# Patient Record
Sex: FEMALE | Race: WHITE | NOT HISPANIC OR LATINO | Employment: FULL TIME | URBAN - METROPOLITAN AREA
[De-identification: names, ages, dates, MRNs, and addresses within clinical notes are randomized per-mention and may not be internally consistent; named-entity substitution may affect disease eponyms.]

---

## 2020-08-11 ENCOUNTER — PRENATAL OFFICE VISIT (OUTPATIENT)
Dept: NURSING | Facility: CLINIC | Age: 30
End: 2020-08-11
Payer: COMMERCIAL

## 2020-08-11 DIAGNOSIS — Z34.81 ENCOUNTER FOR SUPERVISION OF OTHER NORMAL PREGNANCY IN FIRST TRIMESTER: Primary | ICD-10-CM

## 2020-08-11 PROCEDURE — 99207 ZZC NO CHARGE NURSE ONLY: CPT

## 2020-08-11 RX ORDER — PRENATAL VIT/IRON FUM/FOLIC AC 27MG-0.8MG
1 TABLET ORAL DAILY
Qty: 90 TABLET | Refills: 3
Start: 2020-08-11 | End: 2021-06-30

## 2020-08-11 SDOH — ECONOMIC STABILITY: TRANSPORTATION INSECURITY
IN THE PAST 12 MONTHS, HAS LACK OF TRANSPORTATION KEPT YOU FROM MEETINGS, WORK, OR FROM GETTING THINGS NEEDED FOR DAILY LIVING?: NO

## 2020-08-11 SDOH — ECONOMIC STABILITY: FOOD INSECURITY: WITHIN THE PAST 12 MONTHS, THE FOOD YOU BOUGHT JUST DIDN'T LAST AND YOU DIDN'T HAVE MONEY TO GET MORE.: NEVER TRUE

## 2020-08-11 SDOH — ECONOMIC STABILITY: FOOD INSECURITY: WITHIN THE PAST 12 MONTHS, YOU WORRIED THAT YOUR FOOD WOULD RUN OUT BEFORE YOU GOT MONEY TO BUY MORE.: NEVER TRUE

## 2020-08-11 SDOH — ECONOMIC STABILITY: TRANSPORTATION INSECURITY
IN THE PAST 12 MONTHS, HAS THE LACK OF TRANSPORTATION KEPT YOU FROM MEDICAL APPOINTMENTS OR FROM GETTING MEDICATIONS?: NO

## 2020-08-11 SDOH — ECONOMIC STABILITY: INCOME INSECURITY: HOW HARD IS IT FOR YOU TO PAY FOR THE VERY BASICS LIKE FOOD, HOUSING, MEDICAL CARE, AND HEATING?: NOT HARD AT ALL

## 2020-08-11 NOTE — PROGRESS NOTES
Chief Complaint   Patient presents with     Prenatal Care     New Prenatal Nurse Telephone visit   9w0d  Estimated Date of Delivery: Mar 16, 2021      Initial LMP 2020  There is no height or weight on file to calculate BMI.  BP completed using cuff size: NA (Not Taken)    Patient is accompanied by FOB. Prenatal book and folder (containing standard educational hand-outs and brochures) to be given to patient 20. Information in folder reviewed. Questions answered. Brochure given on optional screening available to assess chromosomal anomalies. Pt advised to call the clinic if she has any questions or concerns related to her pregnancy. Prenatal labs obtained 20. New prenatal visit scheduled on 20 with Stephanie Martínez CNM.  Kelly Verdugo RN          No results found for: PAP  18 ASCUS, HPV neg.     (see Care Everywhere)      Patient supplied answers from flow sheet for:  Prenatal OB Questionnaire.  Past Medical History  Diabetes?: No  Hypertension : No  Heart disease, mitral valve prolapse or rheumatic fever?: No  An autoimmune disease such as lupus or rheumatoid arthritis?: No  Kidney disease or urinary tract infection?: (!) Yes(frequent UTI's)  Epilepsy, seizures or spells?: No  Migraine headaches?: (!) Yes  A stroke or loss of function or sensation?: No  Any other neurological problems?: No  Have you ever been treated for depression?: No  Are you having problems with crying spells or loss of self-esteem?: No  Have you ever required psychiatric care?: No  Have you ever had hepatitis, liver disease or jaundice?: No  Have you been treated for blood clots in your veins, deep vein thromosis, inflammation in the veins, thrombosis, phlebitis, pulmonary embolism or varicosities?: No  Have you had excessive bleeding after surgery or dental work?: No  Do you bleed more than other women after a cut or scratch?: No  Do you have a history of anemia?: No  Have you ever had thyroid problems or taken  thyroid medication?: (!) Yes(abn thyroid level 2016)   Do you have any endocrine problems?: No  Have you ever been in a major accident or suffered serious trauma?: No  Within the last year, has anyone hit, slapped, kicked or otherwise hurt you?: No  In the last year, has anyone forced you to have sex when you didn't want to?: No    Past Medical History 2   Have you ever received a blood transfusion?: No  Would you refuse a blood transfusion if a doctor judged it to be medically necessary?: No   If you answered Yes, would you rather die than receive a blood transfusion?: No  If you answered Yes, is this for Protestant reasons?: No  Does anyone in your home smoke?: No  Do you use tobacco products?: No  Do you drink beer, wine or hard liquor?: No  Do you use any of the following: marijuana, speed, cocaine, heroin, hallucinogens or other drugs?: No   Is your blood type Rh negative?: No  Have you ever had abnormal antibodies in your blood?: No  Have you ever had asthma?: No  Have you ever had tuberculosis?: No  Do you have any allergies to drugs or over-the-counter medications?: No  Allergies: Dust Mites, Aspartame, Ethanol, Venlafaxine, Hydrochloride, Sertraline: No  Have you had any breast problems?: No  Have you ever ?: No  Have you had any gynecological surgical procedures such as cervical conization, a LEEP procedure, laser treatment, cryosurgery of the cervix or a dilation and curettage, etc?: (!) Yes  Have you ever had any other surgical procedures?: (!) Yes(see surgical history)  Have you been hospitalized for a nonsurgical reason excluding normal delivery?: No  Have you ever had any anesthetic complications?: No  Have you ever had an abnormal pap smear?: (!) Yes(ASCUS pap HPV neg 5/4/18)    Past Medical History (Continued)  Do you have a history of abnormalities of the uterus?: No  Did your mother take PHUONG or any other hormones when she was pregnant with you?: No  Did it take you more than a year to  become pregnant?: No  Have you ever been evaluated or treated for infertility?: No  Is there a history of medical problems in your family, which you feel may be important to this pregnancy?: No  Do you have any other problems we have not asked about which you feel may be important to this pregnancy?: No    Symptoms since last menstrual period  Do you have any of the following symptoms: abdominal pain, blood in stools or urine, chest pain, shortness of breath, coughing or vomiting up blood, your heart racing or skipping beats, nausea and vomiting, pain on urination or vaginal discharge or bleed: (!) Yes(nausea and vomiting, difficulty sleeping)  Current medications, including over-the-counter medications, you are using? (If not applicable answer none): prenatal vitamin  Will the patient be 35 years old or older at the time of delivery?: No    Has the patient, baby's father or anyone in either family had:  Thalassemia (Italian, Greek, Mediterranean or  background only) and an MCV result less than 80?: No  Neural tube defect such as meningomyelocele, spina bifida or anencephaly?: No  Congenital heart defect?: No  Down's Syndrome?: No  Lester-Sachs disease (Mormon, Cajun, [place])?: No  Sickle cell disease or trait ()?: No  Hemophilia or other inherited problems of blood?: No  Muscular dystrophy?: No  Cystic fibrosis?: No  Shelia's chorea?: No  Mental retardation/autism?: No  If yes, was the person tested for fragile X?: No  Any other inherited genetic or chromosomal disorder?: No  Maternal metabolic disorder (e.g Insulin-dependent diabetes, PKU)?: No  A child with birth defects not listed above?: No  Recurrent pregnancy loss or stillbirth?: No   Has the patient had any medications/street drugs/alcohol since her last menstrual period?: No  Does the patient or baby's father have any other genetic risks?: No    Infection History   Do you object to being tested for Hepatitis B?: No  Do you object to  being tested for HIV?: No   Do you feel that you are at high risk for coming in contact with the AIDS virus?: No  Have you ever been treated for tuberculosis?: No  Have you ever had a positive skin test for tuberculosis?: No  Do you live with someone who has tuberculosis?: No  Have you ever been exposed to tuberculosis?: No  Do you have genital herpes?: No  Does your partner have genital herpes?: No  Have you had a viral illness since your last period?: No  Have you ever had gonorrhea, chlamydia, syphilis, venereal warts, trichomoniasis, pelvic inflammatory disease or any other sexually transmitted disease?: No  Do you know if you are a genital group B streptococcus carrier?: No  Have you had chicken pox/varicella?: (!) Yes   Have you been vaccinated against chicken Pox?: No  Have you had any other infectious diseases?: No    Kelly Verdugo RN

## 2020-08-13 ENCOUNTER — ANCILLARY PROCEDURE (OUTPATIENT)
Dept: ULTRASOUND IMAGING | Facility: CLINIC | Age: 30
End: 2020-08-13
Payer: COMMERCIAL

## 2020-08-13 DIAGNOSIS — Z34.81 ENCOUNTER FOR SUPERVISION OF OTHER NORMAL PREGNANCY IN FIRST TRIMESTER: ICD-10-CM

## 2020-08-13 LAB
ABO + RH BLD: NORMAL
ABO + RH BLD: NORMAL
BLD GP AB SCN SERPL QL: NORMAL
BLOOD BANK CMNT PATIENT-IMP: NORMAL
ERYTHROCYTE [DISTWIDTH] IN BLOOD BY AUTOMATED COUNT: 12.3 % (ref 10–15)
HCT VFR BLD AUTO: 31.2 % (ref 35–47)
HGB BLD-MCNC: 10.6 G/DL (ref 11.7–15.7)
MCH RBC QN AUTO: 30.5 PG (ref 26.5–33)
MCHC RBC AUTO-ENTMCNC: 34 G/DL (ref 31.5–36.5)
MCV RBC AUTO: 90 FL (ref 78–100)
PLATELET # BLD AUTO: 174 10E9/L (ref 150–450)
RBC # BLD AUTO: 3.48 10E12/L (ref 3.8–5.2)
SPECIMEN EXP DATE BLD: NORMAL
WBC # BLD AUTO: 6.2 10E9/L (ref 4–11)

## 2020-08-13 PROCEDURE — 84443 ASSAY THYROID STIM HORMONE: CPT | Performed by: ADVANCED PRACTICE MIDWIFE

## 2020-08-13 PROCEDURE — 85027 COMPLETE CBC AUTOMATED: CPT | Performed by: ADVANCED PRACTICE MIDWIFE

## 2020-08-13 PROCEDURE — 86780 TREPONEMA PALLIDUM: CPT | Performed by: ADVANCED PRACTICE MIDWIFE

## 2020-08-13 PROCEDURE — 86762 RUBELLA ANTIBODY: CPT | Performed by: ADVANCED PRACTICE MIDWIFE

## 2020-08-13 PROCEDURE — 87340 HEPATITIS B SURFACE AG IA: CPT | Performed by: ADVANCED PRACTICE MIDWIFE

## 2020-08-13 PROCEDURE — 87086 URINE CULTURE/COLONY COUNT: CPT | Performed by: ADVANCED PRACTICE MIDWIFE

## 2020-08-13 PROCEDURE — 84439 ASSAY OF FREE THYROXINE: CPT | Performed by: INTERNAL MEDICINE

## 2020-08-13 PROCEDURE — 87389 HIV-1 AG W/HIV-1&-2 AB AG IA: CPT | Performed by: ADVANCED PRACTICE MIDWIFE

## 2020-08-13 PROCEDURE — 86900 BLOOD TYPING SEROLOGIC ABO: CPT | Performed by: ADVANCED PRACTICE MIDWIFE

## 2020-08-13 PROCEDURE — 76801 OB US < 14 WKS SINGLE FETUS: CPT

## 2020-08-13 PROCEDURE — 76801 OB US < 14 WKS SINGLE FETUS: CPT | Performed by: OBSTETRICS & GYNECOLOGY

## 2020-08-13 PROCEDURE — 36415 COLL VENOUS BLD VENIPUNCTURE: CPT | Performed by: ADVANCED PRACTICE MIDWIFE

## 2020-08-13 PROCEDURE — 86850 RBC ANTIBODY SCREEN: CPT | Performed by: ADVANCED PRACTICE MIDWIFE

## 2020-08-13 PROCEDURE — 86901 BLOOD TYPING SEROLOGIC RH(D): CPT | Performed by: ADVANCED PRACTICE MIDWIFE

## 2020-08-14 DIAGNOSIS — O99.011 ANEMIA DURING PREGNANCY IN FIRST TRIMESTER: Primary | ICD-10-CM

## 2020-08-14 LAB
HBV SURFACE AG SERPL QL IA: NONREACTIVE
HIV 1+2 AB+HIV1 P24 AG SERPL QL IA: NONREACTIVE
RUBV IGG SERPL IA-ACNC: 23 IU/ML
T PALLIDUM AB SER QL: NONREACTIVE
T4 FREE SERPL-MCNC: 1.52 NG/DL (ref 0.76–1.46)
TSH SERPL DL<=0.005 MIU/L-ACNC: <0.01 MU/L (ref 0.4–4)

## 2020-08-14 RX ORDER — FERROUS SULFATE 325(65) MG
325 TABLET, DELAYED RELEASE (ENTERIC COATED) ORAL DAILY
Qty: 90 TABLET | Refills: 3 | Status: SHIPPED | OUTPATIENT
Start: 2020-08-14 | End: 2021-06-30

## 2020-08-15 LAB
BACTERIA SPEC CULT: NORMAL
SPECIMEN SOURCE: NORMAL

## 2020-08-21 ENCOUNTER — TELEPHONE (OUTPATIENT)
Dept: OBGYN | Facility: CLINIC | Age: 30
End: 2020-08-21

## 2020-08-21 NOTE — TELEPHONE ENCOUNTER
Called and left message regarding lab results - hemoglobin and thyroid.  She had originally had a new OB appt.  She no longer has one scheduled.

## 2020-08-22 ENCOUNTER — NURSE TRIAGE (OUTPATIENT)
Dept: NURSING | Facility: CLINIC | Age: 30
End: 2020-08-22

## 2020-08-22 ENCOUNTER — HOSPITAL ENCOUNTER (EMERGENCY)
Facility: CLINIC | Age: 30
Discharge: HOME | End: 2020-08-22
Attending: EMERGENCY MEDICINE | Admitting: EMERGENCY MEDICINE
Payer: COMMERCIAL

## 2020-08-22 ENCOUNTER — APPOINTMENT (OUTPATIENT)
Dept: ULTRASOUND IMAGING | Facility: CLINIC | Age: 30
End: 2020-08-22
Attending: EMERGENCY MEDICINE
Payer: COMMERCIAL

## 2020-08-22 VITALS
HEART RATE: 91 BPM | HEIGHT: 66 IN | SYSTOLIC BLOOD PRESSURE: 122 MMHG | WEIGHT: 130 LBS | BODY MASS INDEX: 20.89 KG/M2 | OXYGEN SATURATION: 99 % | DIASTOLIC BLOOD PRESSURE: 85 MMHG | RESPIRATION RATE: 18 BRPM | TEMPERATURE: 98 F

## 2020-08-22 DIAGNOSIS — R82.71 BACTERIURIA DURING PREGNANCY: ICD-10-CM

## 2020-08-22 DIAGNOSIS — R10.2 SUPRAPUBIC PAIN: ICD-10-CM

## 2020-08-22 DIAGNOSIS — O99.891 BACTERIURIA DURING PREGNANCY: ICD-10-CM

## 2020-08-22 LAB
ALBUMIN UR-MCNC: NEGATIVE MG/DL
AMORPH CRY #/AREA URNS HPF: ABNORMAL /HPF
ANION GAP SERPL CALCULATED.3IONS-SCNC: 7 MMOL/L (ref 3–14)
APPEARANCE UR: CLEAR
B-HCG SERPL-ACNC: ABNORMAL IU/L (ref 0–5)
BACTERIA #/AREA URNS HPF: ABNORMAL /HPF
BASOPHILS # BLD AUTO: 0 10E9/L (ref 0–0.2)
BASOPHILS NFR BLD AUTO: 0.2 %
BILIRUB UR QL STRIP: NEGATIVE
BUN SERPL-MCNC: 9 MG/DL (ref 7–30)
CALCIUM SERPL-MCNC: 8.5 MG/DL (ref 8.5–10.1)
CHLORIDE SERPL-SCNC: 108 MMOL/L (ref 94–109)
CO2 SERPL-SCNC: 23 MMOL/L (ref 20–32)
COLOR UR AUTO: ABNORMAL
CREAT SERPL-MCNC: 0.66 MG/DL (ref 0.52–1.04)
DIFFERENTIAL METHOD BLD: ABNORMAL
EOSINOPHIL # BLD AUTO: 0.1 10E9/L (ref 0–0.7)
EOSINOPHIL NFR BLD AUTO: 1.3 %
ERYTHROCYTE [DISTWIDTH] IN BLOOD BY AUTOMATED COUNT: 12.1 % (ref 10–15)
GFR SERPL CREATININE-BSD FRML MDRD: >90 ML/MIN/{1.73_M2}
GLUCOSE SERPL-MCNC: 97 MG/DL (ref 70–99)
GLUCOSE UR STRIP-MCNC: NEGATIVE MG/DL
HCT VFR BLD AUTO: 31.6 % (ref 35–47)
HGB BLD-MCNC: 10.7 G/DL (ref 11.7–15.7)
HGB UR QL STRIP: NEGATIVE
IMM GRANULOCYTES # BLD: 0 10E9/L (ref 0–0.4)
IMM GRANULOCYTES NFR BLD: 0.5 %
KETONES UR STRIP-MCNC: NEGATIVE MG/DL
LEUKOCYTE ESTERASE UR QL STRIP: ABNORMAL
LYMPHOCYTES # BLD AUTO: 1.3 10E9/L (ref 0.8–5.3)
LYMPHOCYTES NFR BLD AUTO: 21.3 %
MCH RBC QN AUTO: 30.6 PG (ref 26.5–33)
MCHC RBC AUTO-ENTMCNC: 33.9 G/DL (ref 31.5–36.5)
MCV RBC AUTO: 90 FL (ref 78–100)
MONOCYTES # BLD AUTO: 0.4 10E9/L (ref 0–1.3)
MONOCYTES NFR BLD AUTO: 6.7 %
MUCOUS THREADS #/AREA URNS LPF: PRESENT /LPF
NEUTROPHILS # BLD AUTO: 4.3 10E9/L (ref 1.6–8.3)
NEUTROPHILS NFR BLD AUTO: 70 %
NITRATE UR QL: NEGATIVE
NRBC # BLD AUTO: 0 10*3/UL
NRBC BLD AUTO-RTO: 0 /100
PH UR STRIP: 5.5 PH (ref 5–7)
PLATELET # BLD AUTO: 170 10E9/L (ref 150–450)
POTASSIUM SERPL-SCNC: 3.3 MMOL/L (ref 3.4–5.3)
RBC # BLD AUTO: 3.5 10E12/L (ref 3.8–5.2)
RBC #/AREA URNS AUTO: 5 /HPF (ref 0–2)
SODIUM SERPL-SCNC: 138 MMOL/L (ref 133–144)
SOURCE: ABNORMAL
SP GR UR STRIP: 1.01 (ref 1–1.03)
SQUAMOUS #/AREA URNS AUTO: 2 /HPF (ref 0–1)
UROBILINOGEN UR STRIP-MCNC: NORMAL MG/DL (ref 0–2)
WBC # BLD AUTO: 6.1 10E9/L (ref 4–11)
WBC #/AREA URNS AUTO: 7 /HPF (ref 0–5)

## 2020-08-22 PROCEDURE — 81001 URINALYSIS AUTO W/SCOPE: CPT | Performed by: EMERGENCY MEDICINE

## 2020-08-22 PROCEDURE — 87086 URINE CULTURE/COLONY COUNT: CPT | Performed by: EMERGENCY MEDICINE

## 2020-08-22 PROCEDURE — 80048 BASIC METABOLIC PNL TOTAL CA: CPT | Performed by: EMERGENCY MEDICINE

## 2020-08-22 PROCEDURE — 76817 TRANSVAGINAL US OBSTETRIC: CPT

## 2020-08-22 PROCEDURE — 84702 CHORIONIC GONADOTROPIN TEST: CPT | Performed by: EMERGENCY MEDICINE

## 2020-08-22 PROCEDURE — 99285 EMERGENCY DEPT VISIT HI MDM: CPT

## 2020-08-22 PROCEDURE — 85025 COMPLETE CBC W/AUTO DIFF WBC: CPT | Performed by: EMERGENCY MEDICINE

## 2020-08-22 RX ORDER — NITROFURANTOIN 25; 75 MG/1; MG/1
100 CAPSULE ORAL 2 TIMES DAILY
Qty: 14 CAPSULE | Refills: 0 | Status: SHIPPED | OUTPATIENT
Start: 2020-08-22 | End: 2020-09-24

## 2020-08-22 ASSESSMENT — ENCOUNTER SYMPTOMS
ABDOMINAL PAIN: 1
DIFFICULTY URINATING: 0

## 2020-08-22 ASSESSMENT — MIFFLIN-ST. JEOR: SCORE: 1331.43

## 2020-08-22 NOTE — TELEPHONE ENCOUNTER
"fv  \"I am 10 weeks pregnant and for the past hour or so I've been having intense lower abdominal pain. At times it's an 8/10 and I have to bend over.\"  Per epic, patient had a normal US on 8/13/20 due to hx of miscarriage  Denies bleeding or other sx.  Triaged and advised ER  Marizol Jacobsen RN [facility]    COVID 19 Nurse Triage Plan/Patient Instructions    Please be aware that novel coronavirus (COVID-19) may be circulating in the community. If you develop symptoms such as fever, cough, or SOB or if you have concerns about the presence of another infection including coronavirus (COVID-19), please contact your health care provider or visit [email or web address].     Disposition/Instructions    ED Visit recommended. Follow protocol based instructions.     Bring Your Own Device:  Please also bring your smart device(s) (smart phones, tablets, laptops) and their charging cables for your personal use and to communicate with your care team during your visit.    Thank you for taking steps to prevent the spread of this virus.  o Limit your contact with others.  o Wear a simple mask to cover your cough.  o Wash your hands well and often.    Resources    [facility]: About COVID-19: [email or web address]    CDC: What to Do If You're Sick: www.cdc.gov/coronavirus/2019-ncov/about/steps-when-sick.html    CDC: Ending Home Isolation: www.cdc.gov/coronavirus/2019-ncov/hcp/disposition-in-home-patients.html     CDC: Caring for Someone: www.cdc.gov/coronavirus/2019-ncov/if-you-are-sick/care-for-someone.html     [facility]: Interim Guidance for Hospital Discharge to Home: www.health.state.[place]/diseases/coronavirus/hcp/hospdischarge.pdf    [facility] clinical trials (COVID-19 research studies): [email or web address]/umn-clinical-trials     Below are the COVID-19 hotlines at the [place] Department of Health ([facility]). Interpreters are available.   o For health questions: Call 702-392-1014 or " "1-100.197.7627 (7 a.m. to 7 p.m.)  o For questions about schools and childcare: Call 697-870-1606 or 1-155.216.6611 (7 a.m. to 7 p.m.)                     Additional Information    Negative: Lightheadedness or dizziness (e.g., feels like passing out)    Negative: Patient sounds very sick or weak to the triager    Negative: MODERATE-SEVERE abdominal pain (e.g., interferes with normal activities, awakens from sleep)    Negative: [1] SEVERE vaginal bleeding (i.e., soaking 2 pads / hour, large blood clots) AND [2] present 2 or more hours    Negative: [1] MODERATE vaginal bleeding (i.e., soaking 1 pad / hour; clots) AND [2] present > 6 hours    Negative: [1] MODERATE vaginal bleeding (i.e., soaking 1 pad / hour; clots) AND [2] pregnant > 12 weeks    Negative: Passed tissue (e.g., gray-white)    Negative: [1] Vomiting AND [2] contains red blood or black (\"coffee ground\") material  (Exception: few red streaks in vomit that only happened once)    Negative: Shoulder pain    [1] Constant abdominal pain AND [2] present > 2 hours    Protocols used: PREGNANCY - ABDOMINAL PAIN LESS THAN 20 WEEKS EGA-A-AH      "

## 2020-08-22 NOTE — ED PROVIDER NOTES
"History     Chief Complaint:  Abdominal Pain       The history is provided by the patient.     Daniel Steiner is a 29 year old female (A3) with a history of miscarriages, kidney stones, and chronic headaches, who presents for evaluation of intermittent abdominal pain that first began approximately two hours ago. She reports that she has been experiencing an intermittent abdominal pain that is \"really intense\" and lasts a few seconds, then subsides. The patient states that this pain comes and goes approximately every minute. Her abdominal pain is across her abdomen and is not one-sided. She denies any vaginal discharge, vaginal bleeding, or difficulty urinating. She called the nurse line for her pain, and then encouraged her to present today to the ED.    Of note, the patient has had three prior miscarriages at 10 weeks, 4 weeks, and 7 weeks. With previous miscarriages she reports a small amount of pain, but mostly just bleeding. She has not experienced pain like this before. The patient shares that has not had a workup done regarding her frequent miscarriages, but her doctors have discussed this if her miscarriages continue. The patient states that she nor her partner have medical problems. Of note, she had a recent ultrasound done as below.     US OB < 14 weeks from 2020:  Early obstetric transabdominal ultrasound. Living intrauterine pregnancy is seen.   Corresponding sonographic and menstrual EGA and EDC. Based on this, best estimated Date of Delivery: Mar 16, 2021  Normal amniotic fluid seen.  Placenta: not seen, appropriate for this gestational age  Gestational sac: seen and within normal limits   Normal early OB ultrasound.  Reading per radiology.    Allergies:  No Known Drug Allergies    Medications:   Ferrous sulfate  Prenatal vitamin     Medical History:   Chronic headaches  Kidney stone  B12 deficiency     Surgical History   D & C   Septoplasty  Tonsillectomy & adenoidectomy    Family History: " "  Mother -  anxiety    Social History:  The patient was unaccompanied to the ED.  Smoking Status: Former - 2010  Smokeless Tobacco: Never  Alcohol Use: No  Drug Use: Never       Review of Systems   Gastrointestinal: Positive for abdominal pain.   Genitourinary: Negative for difficulty urinating, vaginal bleeding and vaginal discharge.   All other systems reviewed and are negative.        Physical Exam     Patient Vitals for the past 24 hrs:   BP Temp Temp src Pulse Resp SpO2 Height Weight   08/22/20 1700 122/85 -- -- 91 -- 99 % -- --   08/22/20 1600 123/82 -- -- 98 -- 100 % -- --   08/22/20 1500 114/77 -- -- 94 -- 100 % -- --   08/22/20 1419 121/87 98  F (36.7  C) Temporal 99 18 97 % 1.676 m (5' 6\") 59 kg (130 lb)          Physical Exam  I have reviewed the triage vital signs    Constitutional: Appears stated age  Eyes: No discharge, symmetrical lids  ENT: Moist mucous membranes, no ear discharge  Neck: Full range of motion  Respiratory: CTAB, no wheezes  Cardiovascular: Regular rate and rhythm, no lower extremity edema  Chest: Equal rise  Gastrointestinal: Soft. Nondistended. Suprapubic TTP. No rebound or guarding  Musculoskeletal: No gross deformities.   Skin: Warm and well perfused. No visible rash.  Neurologic: Moves all extremities, speech fluent without dysarthria  Psychiatric: Appropriate affect, alert and interactive       Emergency Department Course     Imaging:  Radiology results were communicated with the patient who voiced understanding of the findings.    US OB < 14 Weeks W Transvaginal:  IMPRESSION:   1.  Single living intrauterine gestation at 10 weeks 5 days, EDC   3/15/2021.   Reading per radiology.    Laboratory:  Laboratory findings were communicated with the patient who voiced understanding of the findings.    CBC: WBC 6.1, HGB 10.7 (L),   BMP: Potassium 3.3 (L), (Creatinine 0.66) o/w WNL   HCG Quantitative:  71,485 (H)     UA with Microscopic: Leukocyte Esterase Large (A), RBC/HPF 5 (H), " WBC/HPF 7 (H), Bacteria few (A), Squamous Epithelial 2 (H), Mucous present (A), Amorphous crystals few (A), o/w WNL  Urine Culture: pending    Emergency Department Course:    1421 Nursing notes and vitals reviewed.    1429 I performed an exam of the patient as documented above.     1435 IV was inserted and blood was drawn for laboratory testing, results above.    1556 The patient provided a urine sample here in the emergency department. This was sent for laboratory testing, findings above.     1536 The patient was sent for US while in the emergency department, results above.     1640 Patient rechecked and updated.      1648 Findings and plan explained to the Patient. Patient discharged home with instructions regarding supportive care, medications, and reasons to return. The importance of close follow-up was reviewed. The patient was prescribed as below.    Impression & Plan     Medical Decision MakinF  10 weeks GA presenting with lower abdominal pain.  DDx includes but is not limited to threatened , subchorionic hematoma, inevitable , UTI, bacteruria, round ligament pain.  TV US obtained, showing a living IUP.  Labs obtained, as above.  UA shows bacteruria.  Pt does report some mild intermittent dysuria; this could feasibly be the cause of pt's pain; will culture, and treat empirically with macrobid.  Advised close follow-up with her primary OB.  Strict RTED precautions given.        Diagnosis:     ICD-10-CM    1. Bacteriuria during pregnancy  O99.89     R82.71    2. Suprapubic pain  R10.2         Disposition:  Discharged to home.    Discharge Medications:  Discharge Medication List as of 2020  5:03 PM      START taking these medications    Details   nitroFURantoin macrocrystal-monohydrate (MACROBID) 100 MG capsule Take 1 capsule (100 mg) by mouth 2 times daily, Disp-14 capsule,R-0, Local Print             Scribe Disclosure:  Kiana CHAHAL, am serving as a scribe at 2:22 PM on  8/22/2020 to document services personally performed by Abdirahman Wyman MD based on my observations and the provider's statements to me.      Abdirahman Wyman MD  08/22/20 8771

## 2020-08-22 NOTE — ED AVS SNAPSHOT
[facility] Emergency Department  [address]  [facility] [address]  Phone:  [phone]  Fax:  [phone]                                    Daniel Steiner   MRN: [id number]    Department:  [facility] Emergency Department   Date of Visit:  8/22/2020           After Visit Summary Signature Page    I have received my discharge instructions, and my questions have been answered. I have discussed any challenges I see with this plan with the nurse or doctor.    ..........................................................................................................................................  Patient/Patient Representative Signature      ..........................................................................................................................................  Patient Representative Print Name and Relationship to Patient    ..................................................               ................................................  Date                                   Time    ..........................................................................................................................................  Reviewed by Signature/Title    ...................................................              ..............................................  Date                                               Time          22EPIC Rev 08/18

## 2020-08-22 NOTE — ED TRIAGE NOTES
ABCs intact. Pt is about 10 weeks pregnant. Pt has had 3 miscarriages. Pt c/o abdominal cramping x 2 hrs. Denies vaginal bleeding or urinary symptoms. Denies fever, cough, sore throat or headache.

## 2020-08-23 LAB
BACTERIA SPEC CULT: NORMAL
Lab: NORMAL
SPECIMEN SOURCE: NORMAL

## 2020-08-28 ENCOUNTER — PRENATAL OFFICE VISIT (OUTPATIENT)
Dept: OBGYN | Facility: CLINIC | Age: 30
End: 2020-08-28
Payer: COMMERCIAL

## 2020-08-28 VITALS
SYSTOLIC BLOOD PRESSURE: 118 MMHG | BODY MASS INDEX: 21.86 KG/M2 | HEIGHT: 66 IN | WEIGHT: 136 LBS | DIASTOLIC BLOOD PRESSURE: 70 MMHG

## 2020-08-28 DIAGNOSIS — Z12.4 SCREENING FOR CERVICAL CANCER: Primary | ICD-10-CM

## 2020-08-28 DIAGNOSIS — Z34.91 PRENATAL CARE IN FIRST TRIMESTER: ICD-10-CM

## 2020-08-28 DIAGNOSIS — E05.90 HYPERTHYROIDISM: ICD-10-CM

## 2020-08-28 LAB
T4 FREE SERPL-MCNC: 1.16 NG/DL (ref 0.76–1.46)
TSH SERPL DL<=0.005 MIU/L-ACNC: 0.03 MU/L (ref 0.4–4)

## 2020-08-28 PROCEDURE — 99207 ZZC FIRST OB VISIT: CPT | Performed by: ADVANCED PRACTICE MIDWIFE

## 2020-08-28 PROCEDURE — 84439 ASSAY OF FREE THYROXINE: CPT | Performed by: ADVANCED PRACTICE MIDWIFE

## 2020-08-28 PROCEDURE — 36415 COLL VENOUS BLD VENIPUNCTURE: CPT | Performed by: ADVANCED PRACTICE MIDWIFE

## 2020-08-28 PROCEDURE — 40000791 ZZHCL STATISTIC VERIFI PRENATAL TRISOMY 21,18,13: Mod: 90 | Performed by: ADVANCED PRACTICE MIDWIFE

## 2020-08-28 PROCEDURE — 99000 SPECIMEN HANDLING OFFICE-LAB: CPT | Performed by: ADVANCED PRACTICE MIDWIFE

## 2020-08-28 PROCEDURE — 84443 ASSAY THYROID STIM HORMONE: CPT | Performed by: ADVANCED PRACTICE MIDWIFE

## 2020-08-28 ASSESSMENT — MIFFLIN-ST. JEOR: SCORE: 1358.64

## 2020-08-28 NOTE — PROGRESS NOTES
is a  partnered   3 para 0 0 2 0 giving an EDC by ultrasound of 3/16/2021 who presents for a new OB Visit with her partner. She has not had bleeding since her LMP.  She has had mild nausea.. Weigh loss   has not occurred.. She denies fever, chills and viral infections since her last LMP.  There is mildfatigue.  This was a planned pregnancy.  She is not a previous CNM patient.  FOB is present and supportive.  =========================================    INFECTION HISTORY  HIV: no  Hepatitis B: no  Hepatitis C: no  Tuberculosis: no  Last PPD   Herpes self: no  Herpes partner:  no  Chlamydia:  No - declines testing  Gonorrhea:  No - declines testing   HPV: no  BV:  no  Trichomonis:  no  Syphilis:  no  Chicken Pox:  yes  ============================================    PERSONAL/SOCIAL HISTORY  Lives with partner.  Employment: Full time.  Discussed risk for COVID.    =============================================    REVIEW OF SYSTEMS  CONSTITUTIONAL: Denies fever, chills and night sweats  DIET: Appetite is continue.  Eats a regular. .    Plans to gain 25-35 pounds with her pregnancy.  SKIN: Denies changes and suspicious moles or lesions.  ENT: Denies blurred vision, hearing loss, tinnitus, frequent colds, epistaxis, hoarseness and tooth pain.    ENDOCRINE: Denies heat and cold intolerance, and polydypsia.    BREASTS:  Tender since LMP.  Denies discharge and lumps.   HEART/LUNGS: Denies dyspnea, wheezing, coughing and chest pain.  HEMATOLOGIC: Denies tendency to bruise and history of blood clots.  GASTROINTESTINAL: Denies heartburn, indigestion and change of color in stools.    GENITOURINARY:  Denies urgency, dysuria and hematuria.  Has frequency of urination since LMP.   NEUROLOGIC:  Denies seizures, weakness and fainting.  PSYCHIATRIC:  Denies mood changes  ===========================================    PHYSICAL EXAM  GENERAL:   pleasant pregnant female, alert, cooperative, and well groomed.  SKIN:  Warm  and dry, without lesions or tenderness.    HEAD: Symmetrical features.  EYES:  PERRLA, wears no corective lenses.  EARS: Tympanic membranes gray, translucent and intact.  NOSE: Masked   MOUTH:  Masked.    NECK:  Thyroid without enlargement and nodules.  Lymph nodes not palpable.   LUNGS:  Clear to auscultation.  BREAST:  Symmetrical without lesions or nodes.  Nipples everted.  Areolas symmetric.  No palpable axillary nodes.  HEART:  RRR without murmur.  ABDOMEN: Soft without masses or tenderness.  No CVA tenderness.  Uterus palpable at size equal to dates.    MUSCULOSKELETAL:  Full range of motion  GENITALIA: Pelvic exam offered and declined.  Deferred.  ===================================================    PLAN:  Instructed on use of triage nurse line and contacting the on call CNM after hours in an emergency.    Discussed the indications, uses for and false positives for quad screen and fetal survey ultrasounds at 18-20 weeks gestation.  Will inform us at the next visit if she wished to avail herself of these screens.  Innatal ordered  Per her request.  They would like to know the gender.  Will return to the clinic in 4 weeks for her next routine prenatal check.  Will call to be seen sooner if problem arise.  Oriented to CNM Service and added to list.      Stephanie Martínez, SHAINA, APRN, CNM

## 2020-08-28 NOTE — NURSING NOTE
"Chief Complaint   Patient presents with     Prenatal Care       Initial /70 (BP Location: Right arm, Cuff Size: Adult Regular)   Ht 1.676 m (5' 6\")   Wt 61.7 kg (136 lb)   LMP 2020   BMI 21.95 kg/m   Estimated body mass index is 21.95 kg/m  as calculated from the following:    Height as of this encounter: 1.676 m (5' 6\").    Weight as of this encounter: 61.7 kg (136 lb).  BP completed using cuff size: regular    Questioned patient about current smoking habits.  Pt. has never smoked.          The following HM Due: pap smear      Frandy Munoz MA    "

## 2020-09-02 LAB — LAB SCANNED RESULT: NORMAL

## 2020-09-03 ENCOUNTER — TELEPHONE (OUTPATIENT)
Dept: MIDWIFE SERVICES | Facility: CLINIC | Age: 30
End: 2020-09-03

## 2020-09-03 NOTE — TELEPHONE ENCOUNTER
Results received from [facility] testing in [facility]..  Testing done:  Innatal Prenatal Screen    Action:  Spoke to pt and gave NORMAL results.    Gender:   **GIRL**  Gender revealed via letter for pt to .    Routed to provider as YOHANNES Ghosh RN

## 2020-09-10 ENCOUNTER — TELEPHONE (OUTPATIENT)
Dept: MIDWIFE SERVICES | Facility: CLINIC | Age: 30
End: 2020-09-10

## 2020-09-10 NOTE — TELEPHONE ENCOUNTER
Pt calling, stating her dog jumped on her abdomen this AM.  13w2d.  Blood type A+.  Denies cramping/pain or bleeding.    Advised to monitor throughout the day.  Let us know if she has any pain or bleeding.    Jaimee Ghosh RN

## 2020-09-24 ENCOUNTER — PRENATAL OFFICE VISIT (OUTPATIENT)
Dept: OBGYN | Facility: CLINIC | Age: 30
End: 2020-09-24
Payer: COMMERCIAL

## 2020-09-24 VITALS — SYSTOLIC BLOOD PRESSURE: 116 MMHG | DIASTOLIC BLOOD PRESSURE: 74 MMHG | BODY MASS INDEX: 21.95 KG/M2 | WEIGHT: 136 LBS

## 2020-09-24 DIAGNOSIS — Z34.92 NORMAL PREGNANCY, SECOND TRIMESTER: Primary | ICD-10-CM

## 2020-09-24 DIAGNOSIS — R30.0 DYSURIA: ICD-10-CM

## 2020-09-24 LAB
ALBUMIN UR-MCNC: NEGATIVE MG/DL
APPEARANCE UR: CLEAR
BACTERIA #/AREA URNS HPF: ABNORMAL /HPF
BILIRUB UR QL STRIP: NEGATIVE
COLOR UR AUTO: YELLOW
GLUCOSE UR STRIP-MCNC: NEGATIVE MG/DL
HGB UR QL STRIP: NEGATIVE
KETONES UR STRIP-MCNC: NEGATIVE MG/DL
LEUKOCYTE ESTERASE UR QL STRIP: ABNORMAL
NITRATE UR QL: NEGATIVE
NON-SQ EPI CELLS #/AREA URNS LPF: ABNORMAL /LPF
PH UR STRIP: 7.5 PH (ref 5–7)
RBC #/AREA URNS AUTO: ABNORMAL /HPF
SOURCE: ABNORMAL
SP GR UR STRIP: 1.01 (ref 1–1.03)
UROBILINOGEN UR STRIP-ACNC: 0.2 EU/DL (ref 0.2–1)
WBC #/AREA URNS AUTO: ABNORMAL /HPF

## 2020-09-24 PROCEDURE — 87086 URINE CULTURE/COLONY COUNT: CPT | Performed by: ADVANCED PRACTICE MIDWIFE

## 2020-09-24 PROCEDURE — 81001 URINALYSIS AUTO W/SCOPE: CPT | Performed by: ADVANCED PRACTICE MIDWIFE

## 2020-09-24 PROCEDURE — 87088 URINE BACTERIA CULTURE: CPT | Performed by: ADVANCED PRACTICE MIDWIFE

## 2020-09-24 PROCEDURE — 87186 SC STD MICRODIL/AGAR DIL: CPT | Performed by: ADVANCED PRACTICE MIDWIFE

## 2020-09-24 PROCEDURE — 99207 ZZC PRENATAL VISIT: CPT | Performed by: ADVANCED PRACTICE MIDWIFE

## 2020-09-24 NOTE — NURSING NOTE
"Chief Complaint   Patient presents with     Prenatal Care     15w 2d no concerns       Initial /74 (BP Location: Right arm, Cuff Size: Adult Regular)   Wt 61.7 kg (136 lb)   LMP 2020   BMI 21.95 kg/m   Estimated body mass index is 21.95 kg/m  as calculated from the following:    Height as of 20: 1.676 m (5' 6\").    Weight as of this encounter: 61.7 kg (136 lb).  BP completed using cuff size: regular    Questioned patient about current smoking habits.  Pt. has never smoked.          The following HM Due: NONE    Shaneka Luna CMA    "

## 2020-09-24 NOTE — PROGRESS NOTES
S: Feeling well. Has been having dysuria and frequency in the past two days.  Was in the ER last month with a UTI and was treated with Macrobid, completed entire course of antibiotics.  Symptoms were gone until two days ago.   Fetal movement Not yet  Denies loss of fluid/vb/contractions/pelvic pain  Depression screening done  O:  /74 (BP Location: Right arm, Cuff Size: Adult Regular)   Wt 61.7 kg (136 lb)   LMP 06/09/2020   BMI 21.95 kg/m    Exam:  Constitutional: healthy, alert and no distress  Respiratory: Respirations even and unlabored  Gastrointestinal: Abdomen soft, non-tender. Fundus measures appropriately for gestational age. Fetal heart tones heard easily.  Psychiatric: mentation appears normal and affect normal/bright  A:    Diagnosis Comments   1. Normal pregnancy, second trimester  Alpha fetoprotein maternal screen   2. Dysuria  UA with Microscopic     P: Discussed options for AFP screen, pt would like to do so  Anatomy ultrasound next visit between 20-22 weeks  Return to clinic 4 weeks  Sent UA/UC      ROMÁN Quach participated in the care of this patient under direct supervision of Kinjal Unger CNM.     I was present with the student who participated in the service and documentation of the services provided. I have verified the history and personally performed the physical exam and medical decision making as documented by the student and edited by me.  ANDREE Unger CNM 9/24/2020 1:47 PM

## 2020-09-25 DIAGNOSIS — R30.0 DYSURIA: Primary | ICD-10-CM

## 2020-09-25 RX ORDER — NITROFURANTOIN 25; 75 MG/1; MG/1
100 CAPSULE ORAL 2 TIMES DAILY
Qty: 10 CAPSULE | Refills: 0 | Status: SHIPPED | OUTPATIENT
Start: 2020-09-25 | End: 2020-09-30

## 2020-09-27 LAB
BACTERIA SPEC CULT: ABNORMAL
BACTERIA SPEC CULT: ABNORMAL
SPECIMEN SOURCE: ABNORMAL

## 2020-10-28 ENCOUNTER — PRENATAL OFFICE VISIT (OUTPATIENT)
Dept: OBGYN | Facility: CLINIC | Age: 30
End: 2020-10-28
Payer: COMMERCIAL

## 2020-10-28 ENCOUNTER — ANCILLARY PROCEDURE (OUTPATIENT)
Dept: ULTRASOUND IMAGING | Facility: CLINIC | Age: 30
End: 2020-10-28
Attending: ADVANCED PRACTICE MIDWIFE
Payer: COMMERCIAL

## 2020-10-28 VITALS — BODY MASS INDEX: 22.76 KG/M2 | WEIGHT: 141 LBS | DIASTOLIC BLOOD PRESSURE: 80 MMHG | SYSTOLIC BLOOD PRESSURE: 130 MMHG

## 2020-10-28 DIAGNOSIS — Z34.92 NORMAL PREGNANCY, SECOND TRIMESTER: Primary | ICD-10-CM

## 2020-10-28 DIAGNOSIS — Z34.92 NORMAL PREGNANCY, SECOND TRIMESTER: ICD-10-CM

## 2020-10-28 PROCEDURE — 99207 PR PRENATAL VISIT: CPT | Performed by: ADVANCED PRACTICE MIDWIFE

## 2020-10-28 PROCEDURE — 76805 OB US >/= 14 WKS SNGL FETUS: CPT | Performed by: OBSTETRICS & GYNECOLOGY

## 2020-10-28 NOTE — PATIENT INSTRUCTIONS
Weeks 21 thru 23 - Gestational Age (Fetal Age - Weeks 19 thru 21):  You may be feeling fetal movement every day now. Lanugo now covers the fetus s entire body. The fetus is beginning to have the look of a  infant as the skin becomes less see through and  fat begins to develop. All the parts of the eyes are developed. The liver and pancreas are working hard to develop completely. The fetus has reached about 10-11 inches in length and weighs about 1 - 1   pounds      Patient Education     Understanding  Labor  Going into labor before your 37th week of pregnancy is called  labor.  labor can cause your baby to be born too soon. This can lead to a number of health problems that may affect your baby.      Symptoms of  labor  If you think you re having  labor, get medical help right away. Contractions alone don t mean you re in  labor. What matters more are changes in your cervix (the lower end of the uterus). Symptoms of  labor include:    Four or more contractions per hour    Strong contractions    Constant menstrual-like cramping    Low-back pain    Mucous or bloody vaginal discharge    Bleeding or spotting in the second or third trimester  Evaluating  labor  Your healthcare provider will try to find out whether you re in  labor or whether you re just having contractions. He or she may watch you for a few hours. The following tests may be done:    Pelvic exam to see if your cervix has effaced (thinned) and dilated (opened)    Uterine activity monitoring to detect contractions    Fetal monitoring to check the health of your baby    Ultrasound to check your baby s size and position    Amniocentesis to check how mature your baby s lungs are  Caring for yourself at home  If you have  contractions, but your cervix is still thick and closed, your healthcare provider may ask you to do the following at home:    Drink plenty of water.    Do fewer  activities.    Rest in bed on your side.    Don't have intercourse or nipple stimulation.  When to call your healthcare provider  Call your healthcare provider if you notice any of these:    Four or more contractions per hour    Bag of water breaks    Bleeding or spotting  If you need hospital care   labor often requires that you have hospital care and complete bed rest. You may have an IV (intravenous) line to get fluids. You may be given pills or injections to help prevent contractions. Finally, you may get medicine (corticosteroids) that helps your baby s lungs mature more quickly.  Are you at risk?  Any pregnant woman can have  labor. It may start for no reason. But these risk factors can increase your chances:    Past  labor or past early birth    Smoking, drug, or alcohol use during pregnancy    Multiple fetuses (twins or more)    Problems with the shape of the uterus    Bleeding during the pregnancy  The dangers of  birth  A baby born too soon may have health problems. This is because the baby didn t have enough time to mature. Some of the risks for your baby include:    Not breastfeeding or feeding well    Having immature lungs    Bleeding in the brain    Dying  Reaching term  Your goal is to get as close to term as you can before giving birth. The closer you get to term, the higher your chance of having a healthy baby. Work with your healthcare provider. Together, you can take steps that may keep you from giving birth too early.  Date Last Reviewed: 2016-2019 The "[employer]". [address] [place] [address], [place] [address]. All rights reserved. This information is not intended as a substitute for professional medical care. Always follow your healthcare professional's instructions.

## 2020-10-28 NOTE — PROGRESS NOTES
S: Feels well and has no concerns.  Has started feeling fetal movement.  Denies uterine cramping, vaginal bleeding or leaking of fluid  O: Vitals: /80 (BP Location: Left arm, Cuff Size: Adult Regular)   Wt 64 kg (141 lb)   LMP 06/09/2020   BMI 22.76 kg/m    BMI= Body mass index is 22.76 kg/m .  Exam:  Constitutional: healthy, alert and no distress  Respiratory: respirations even and unlabored  Gastrointestinal: Abdomen soft, non-tender. Fundus measures appropriate for gestational age. Fetal heart tones hear without difficulty and within normal limits  : Deferred  Psychiatric: mentation appears normal and affect normal/bright  A:    Diagnosis Comments   1. Normal pregnancy, second trimester         P: Discussed 20 week fetal screen.   Encouraged patient to call with any questions or concerns.      SWAPNIL Bowser, CNM

## 2020-10-28 NOTE — NURSING NOTE
"Chief Complaint   Patient presents with     Prenatal Care       Initial /80 (BP Location: Left arm, Cuff Size: Adult Regular)   Wt 64 kg (141 lb)   LMP 2020   BMI 22.76 kg/m   Estimated body mass index is 22.76 kg/m  as calculated from the following:    Height as of 20: 1.676 m (5' 6\").    Weight as of this encounter: 64 kg (141 lb).  BP completed using cuff size: regular    Questioned patient about current smoking habits.  Pt. has never smoked.          Frandy Munoz MA               "

## 2020-10-29 DIAGNOSIS — Z34.82 ENCOUNTER FOR SUPERVISION OF OTHER NORMAL PREGNANCY, SECOND TRIMESTER: Primary | ICD-10-CM

## 2020-11-09 ENCOUNTER — ANCILLARY PROCEDURE (OUTPATIENT)
Dept: ULTRASOUND IMAGING | Facility: CLINIC | Age: 30
End: 2020-11-09
Attending: ADVANCED PRACTICE MIDWIFE
Payer: COMMERCIAL

## 2020-11-09 DIAGNOSIS — Z34.82 ENCOUNTER FOR SUPERVISION OF OTHER NORMAL PREGNANCY, SECOND TRIMESTER: ICD-10-CM

## 2020-11-09 PROCEDURE — 76816 OB US FOLLOW-UP PER FETUS: CPT | Performed by: OBSTETRICS & GYNECOLOGY

## 2020-11-11 ENCOUNTER — TELEPHONE (OUTPATIENT)
Dept: OBGYN | Facility: CLINIC | Age: 30
End: 2020-11-11

## 2020-11-11 DIAGNOSIS — R39.9 URINARY SYMPTOM OR SIGN: ICD-10-CM

## 2020-11-11 DIAGNOSIS — Z34.92 NORMAL PREGNANCY, SECOND TRIMESTER: Primary | ICD-10-CM

## 2020-11-11 NOTE — TELEPHONE ENCOUNTER
Called and left message for pt to call back for ultrasound results. Not all structures were able to be visualized on her anatomy scan. It is recommended she go to [facility] for a targeted ultrasound to evaluate those structures not seen. I have placed an order for this.     I also see a my-chart encounter from the patient regarding urinary symptoms which it doesn't look like was followed up on. Can we verify that her symptoms have resolved? If not I would like for her to come in for a U/A and culture.    ANDREE Unger CNM 11/11/2020 10:51 AM

## 2020-11-12 ENCOUNTER — TRANSCRIBE ORDERS (OUTPATIENT)
Dept: MATERNAL FETAL MEDICINE | Facility: CLINIC | Age: 30
End: 2020-11-12

## 2020-11-12 ENCOUNTER — PRE VISIT (OUTPATIENT)
Dept: MATERNAL FETAL MEDICINE | Facility: CLINIC | Age: 30
End: 2020-11-12

## 2020-11-12 DIAGNOSIS — Z34.92 NORMAL PREGNANCY, SECOND TRIMESTER: ICD-10-CM

## 2020-11-12 DIAGNOSIS — O26.90 PREGNANCY RELATED CONDITION, ANTEPARTUM: Primary | ICD-10-CM

## 2020-11-12 DIAGNOSIS — R39.9 URINARY SYMPTOM OR SIGN: ICD-10-CM

## 2020-11-12 LAB

## 2020-11-12 PROCEDURE — 81001 URINALYSIS AUTO W/SCOPE: CPT | Performed by: ADVANCED PRACTICE MIDWIFE

## 2020-11-12 NOTE — TELEPHONE ENCOUNTER
Patient informed of results and recommendations.  States she does have slight discomfort with urination.  Lab appt and order placed.  M referral placed.  Kelly Verdugo RN

## 2020-11-13 DIAGNOSIS — R39.9 URINARY SYMPTOM OR SIGN: Primary | ICD-10-CM

## 2020-11-17 ENCOUNTER — OFFICE VISIT (OUTPATIENT)
Dept: MATERNAL FETAL MEDICINE | Facility: CLINIC | Age: 30
End: 2020-11-17
Attending: ADVANCED PRACTICE MIDWIFE
Payer: COMMERCIAL

## 2020-11-17 ENCOUNTER — HOSPITAL ENCOUNTER (OUTPATIENT)
Dept: ULTRASOUND IMAGING | Facility: CLINIC | Age: 30
End: 2020-11-17
Attending: ADVANCED PRACTICE MIDWIFE
Payer: COMMERCIAL

## 2020-11-17 DIAGNOSIS — O43.199 MARGINAL INSERTION OF UMBILICAL CORD AFFECTING MANAGEMENT OF MOTHER: Primary | ICD-10-CM

## 2020-11-17 DIAGNOSIS — O26.90 PREGNANCY RELATED CONDITION, ANTEPARTUM: ICD-10-CM

## 2020-11-17 PROCEDURE — 76811 OB US DETAILED SNGL FETUS: CPT

## 2020-11-17 PROCEDURE — 76811 OB US DETAILED SNGL FETUS: CPT | Mod: 26 | Performed by: OBSTETRICS & GYNECOLOGY

## 2020-11-17 NOTE — PROGRESS NOTES
Please see ultrasound report under imaging tab for details on ultrasound performed today.    Pinky Huerta MD  , OB/GYN  Maternal-Fetal Medicine  [email or web address]  [phone] (Academic office)  [phone] (Pager)

## 2020-11-22 PROBLEM — O43.199 MARGINAL INSERTION OF UMBILICAL CORD AFFECTING MANAGEMENT OF MOTHER: Status: ACTIVE | Noted: 2020-11-22

## 2020-11-22 PROBLEM — O99.012 ANEMIA DURING PREGNANCY IN SECOND TRIMESTER: Status: ACTIVE | Noted: 2020-11-22

## 2020-11-23 ENCOUNTER — PRENATAL OFFICE VISIT (OUTPATIENT)
Dept: OBGYN | Facility: CLINIC | Age: 30
End: 2020-11-23
Payer: COMMERCIAL

## 2020-11-23 VITALS — SYSTOLIC BLOOD PRESSURE: 122 MMHG | DIASTOLIC BLOOD PRESSURE: 70 MMHG | BODY MASS INDEX: 23.73 KG/M2 | WEIGHT: 147 LBS

## 2020-11-23 DIAGNOSIS — O43.199 MARGINAL INSERTION OF UMBILICAL CORD AFFECTING MANAGEMENT OF MOTHER: ICD-10-CM

## 2020-11-23 DIAGNOSIS — Z34.02 SUPERVISION OF NORMAL FIRST PREGNANCY IN SECOND TRIMESTER: Primary | ICD-10-CM

## 2020-11-23 DIAGNOSIS — Z34.02 ENCOUNTER FOR SUPERVISION OF NORMAL FIRST PREGNANCY IN SECOND TRIMESTER: ICD-10-CM

## 2020-11-23 DIAGNOSIS — R94.6 THYROID FUNCTION TEST ABNORMAL: ICD-10-CM

## 2020-11-23 DIAGNOSIS — O99.012 ANEMIA DURING PREGNANCY IN SECOND TRIMESTER: ICD-10-CM

## 2020-11-23 DIAGNOSIS — R39.9 URINARY SYMPTOM OR SIGN: ICD-10-CM

## 2020-11-23 PROCEDURE — 99207 PR PRENATAL VISIT: CPT | Performed by: ADVANCED PRACTICE MIDWIFE

## 2020-11-23 PROCEDURE — 87086 URINE CULTURE/COLONY COUNT: CPT | Performed by: ADVANCED PRACTICE MIDWIFE

## 2020-11-23 PROCEDURE — 36415 COLL VENOUS BLD VENIPUNCTURE: CPT | Performed by: ADVANCED PRACTICE MIDWIFE

## 2020-11-23 PROCEDURE — 84443 ASSAY THYROID STIM HORMONE: CPT | Performed by: ADVANCED PRACTICE MIDWIFE

## 2020-11-23 PROCEDURE — 84439 ASSAY OF FREE THYROXINE: CPT | Performed by: ADVANCED PRACTICE MIDWIFE

## 2020-11-23 NOTE — PROGRESS NOTES
S: Feels well.  Baby active.  Denies uterine cramping, vaginal bleeding or leaking of fluid.  Reviewed signs and symptoms of  labor.  Discussed noticing patterns of movement over the next 6 weeks so that she can use those in the future to assess normal fetal movement.  She is currently taking iron for her anemia and is feeling much better, without any fatigue.  She has not yet seen endocrine due to her low TSH.  We discussed that thyroid disease in pregnancy can be dangerous to babies, so it is a good idea to be seen and treated as well as keeping an eye on her lab results.  She agrees to TSH and T4 today.  She has noted right upper quadrant discomfort with urination, but no lower pelvic discomfort.  She left urine culture today.  Also noticed left sided back pain.  She has not done anything to help relieve this yet.  Discussed daily stretching, massage, and heat to help with this.  May also use chiropractic care.  Videos for back pain in pregnancy were sent to her via [employer].  She was seen at [facility] for marginal cord insertion.  She has questions today about what that is.  Described this for her and discussed ramifications and why they would want to do repeat growth ultrasound at 28 and 34 weeks.  She plans to have that done through [facility].  Appreciates the description.  O: Vitals: LMP 2020   BMI= Body mass index is 23.73 kg/m .  Exam:  Constitutional: healthy, alert and no distress  Respiratory: respirations even and unlabored  Gastrointestinal: Abdomen soft, non-tender. Fundus measures appropriate for gestational age. Fetal heart tones hear without difficulty and within normal limits  : Deferred  Psychiatric: mentation appears normal and affect normal/bright  A:   Patient Active Problem List   Diagnosis     Anemia during pregnancy in second trimester     Marginal insertion of umbilical cord affecting management of mother     Supervision of normal first pregnancy in second trimester       P: Discussed  GCT/repeat RPR for next visit, handout provided, reminded of longer appointment.  Tdap next visit; reviewed CDC recommendations and partner/family vaccination recommended as well.  Need for Rhogam? No, to be done next visit   TSH and free T4 today  Encouraged patient to call with any questions or concerns.  Return to clinic 4 weeks    Pinky Fang CNM

## 2020-11-24 LAB
T4 FREE SERPL-MCNC: 0.99 NG/DL (ref 0.76–1.46)
TSH SERPL DL<=0.005 MIU/L-ACNC: 0.27 MU/L (ref 0.4–4)

## 2020-11-25 LAB
BACTERIA SPEC CULT: NORMAL
Lab: NORMAL
SPECIMEN SOURCE: NORMAL

## 2020-11-28 ENCOUNTER — NURSE TRIAGE (OUTPATIENT)
Dept: NURSING | Facility: CLINIC | Age: 30
End: 2020-11-28

## 2020-11-28 ENCOUNTER — HOSPITAL ENCOUNTER (OUTPATIENT)
Facility: CLINIC | Age: 30
Discharge: HOME | End: 2020-11-28
Attending: ADVANCED PRACTICE MIDWIFE | Admitting: ADVANCED PRACTICE MIDWIFE
Payer: COMMERCIAL

## 2020-11-28 ENCOUNTER — HOSPITAL ENCOUNTER (OUTPATIENT)
Facility: CLINIC | Age: 30
End: 2020-11-28
Admitting: ADVANCED PRACTICE MIDWIFE
Payer: COMMERCIAL

## 2020-11-28 VITALS — RESPIRATION RATE: 16 BRPM | SYSTOLIC BLOOD PRESSURE: 135 MMHG | DIASTOLIC BLOOD PRESSURE: 84 MMHG

## 2020-11-28 LAB
ALBUMIN UR-MCNC: NEGATIVE MG/DL
APPEARANCE UR: ABNORMAL
BACTERIA #/AREA URNS HPF: ABNORMAL /HPF
BILIRUB UR QL STRIP: NEGATIVE
COLOR UR AUTO: ABNORMAL
GLUCOSE UR STRIP-MCNC: NEGATIVE MG/DL
HGB UR QL STRIP: NEGATIVE
KETONES UR STRIP-MCNC: NEGATIVE MG/DL
LEUKOCYTE ESTERASE UR QL STRIP: ABNORMAL
NITRATE UR QL: NEGATIVE
PH UR STRIP: 7 PH (ref 5–7)
RBC #/AREA URNS AUTO: 2 /HPF (ref 0–2)
SOURCE: ABNORMAL
SP GR UR STRIP: 1.01 (ref 1–1.03)
SPECIMEN SOURCE: NORMAL
SQUAMOUS #/AREA URNS AUTO: 5 /HPF (ref 0–1)
UROBILINOGEN UR STRIP-MCNC: NORMAL MG/DL (ref 0–2)
WBC #/AREA URNS AUTO: 11 /HPF (ref 0–5)
WET PREP SPEC: NORMAL

## 2020-11-28 PROCEDURE — 87086 URINE CULTURE/COLONY COUNT: CPT | Performed by: ADVANCED PRACTICE MIDWIFE

## 2020-11-28 PROCEDURE — G0463 HOSPITAL OUTPT CLINIC VISIT: HCPCS

## 2020-11-28 PROCEDURE — 87210 SMEAR WET MOUNT SALINE/INK: CPT | Performed by: ADVANCED PRACTICE MIDWIFE

## 2020-11-28 PROCEDURE — 81001 URINALYSIS AUTO W/SCOPE: CPT | Performed by: ADVANCED PRACTICE MIDWIFE

## 2020-11-28 RX ORDER — NITROFURANTOIN 25; 75 MG/1; MG/1
100 CAPSULE ORAL 2 TIMES DAILY
Qty: 14 CAPSULE | Refills: 0 | Status: ON HOLD | OUTPATIENT
Start: 2020-11-28 | End: 2020-12-26

## 2020-11-28 NOTE — PROGRESS NOTES
[facility] CNM TRIAGE NOTE    Daniel Steiner is a 30 year old  with and IUP at 24w4d who presents with complaint of contractions x 2 hours. Had an hour of cramping yesterday but states today's episode is more uncomfortable and more frequent. Hasn't timed contractions but perceives them to be less than ten minutes apart. Reports decreased pain since arrival once she emptied her bladder for UA/UC.    Patient states baby is active.  Denies ROM   Denies vaginal bleeding  Patient HAS had intercourse within the last 24 hours.    Present OB History at [facility] with the CNMs.    Problems this pregnancy:   1. Marginal cord insertion   - followed by MFM   - serial growth ultrasounds at 28 weeks and 34 weeks  2. Subclinical hyperthyroidism   - referred to endocrinology; has not had initial consult yet   - last TSH 0.27 20   - plan recheck TSH/T4 at 28 weeks  3. Recurrent UTI in pregnancy   - Positive UA 20, treated with Macrobid   - Positive UA, UC positive staphylococcus saprophyticus 20, treated with  Macrobid   - UA suggestive of UTI 20, patient asymptomatic, UC negative 20    ROS:  Patient is alert and oriented  Endorses left flank pain last week. Denies fevers, flank pain, N/V, dysuria, vaginal bleeding, or leaking of fluid today.    PHYSICAL EXAM:  BP (!) 135/90   Resp 16   LMP 2020     FHT's 135 with moderate variability  Accelerations: present   Decelerations:  absent        Contractions:  Little uterine activity detectable by TOCO. Patient marking cramping every 1-15 minutes. Patient states discomfort is more mild now compared to earlier.    Abdomen: gravid, soft  Back: no CVA tenderness  Bloody show: no  Cervix: deferred  Membranes are intact     Results for orders placed or performed during the hospital encounter of 20   UA with Microscopic reflex to Culture     Status: Abnormal    Specimen: Urine clean catch; Midstream Urine   Result Value Ref  Range    Color Urine Light Yellow     Appearance Urine Slightly Cloudy     Glucose Urine Negative NEG^Negative mg/dL    Bilirubin Urine Negative NEG^Negative    Ketones Urine Negative NEG^Negative mg/dL    Specific Gravity Urine 1.007 1.003 - 1.035    Blood Urine Negative NEG^Negative    pH Urine 7.0 5.0 - 7.0 pH    Protein Albumin Urine Negative NEG^Negative mg/dL    Urobilinogen mg/dL Normal 0.0 - 2.0 mg/dL    Nitrite Urine Negative NEG^Negative    Leukocyte Esterase Urine Moderate (A) NEG^Negative    Source Midstream Urine     WBC Urine 11 (H) 0 - 5 /HPF    RBC Urine 2 0 - 2 /HPF    Bacteria Urine Few (A) NEG^Negative /HPF    Squamous Epithelial /HPF Urine 5 (H) 0 - 1 /HPF   Wet prep     Status: None    Specimen: Vagina   Result Value Ref Range    Specimen Description Vagina     Wet Prep No Trichomonas seen     Wet Prep No yeast seen     Wet Prep Moderate  PMNs seen       Wet Prep No clue cells seen        ASSESSMENT :   30 year old  with barroso IUP 24w4d not in labor  NST reactive  GBS unknown and membranes intact  Recurrent UTI  Mildly elevated blood pressure - 135/90, recheck 135/84      PLAN:  Discharge home  UA suspicious for UTI with high WBCs and positive leukocyte esterase. UC pending. Will treat with Macrobid bid x7 days. Rx sent to preferred pharmacy on file. Encouraged probiotic use to prevent secondary yeast infection.  Instructed to make nurse-only visit within the next week for BP check.  Continue routine prenatal care. Reminded patient to call endocrinology to set up consultation appointment. Clinic number given.    Teaching done r/t to s/s of labor, SROM, decreased fetal movement, comfort measures in third trimester.  Instructed to please refer to the discharge handouts, the RN triage line or on-call CNM for any questions or concerns.  Pt verbalizes understanding and agreement with current plan of care.    SWAPNIL Brantley CNM

## 2020-11-29 NOTE — PLAN OF CARE
UA results suspicious for UTI, orders for macrobid RX sent. Patient still reporting intermittent abdominal cramping, palpates as cramps, likely due to urinary symptoms. Per CNM, ok to discharge to home with RX, follow up as scheduled in clinic.

## 2020-11-29 NOTE — PLAN OF CARE
Data: [facility] presented to [facility]: 2020  5:28 PM.  Reason for maternal/fetal assessment is abdominal pain. Patient reports abdominal pain that feels like a severe cramp that she states has become more regular in frequency today.  Patient is a .  Prenatal record reviewed. Pregnancy has been uncomplicated..  Gestational Age 24w4d. VSS. Fetal movement present. [place] within the last 24 hours, and history of recurrent UTI's this pregnancy. Patient denies leaking of vaginal fluid/rupture of membranes, vaginal bleeding, pelvic pressure, nausea, vomiting, headache, visual disturbances, epigastric or URQ pain, significant edema. Support person is present.   Action: Verbal consent for EFM. Triage assessment completed. Bill of rights reviewed.  Response: Patient verbalized agreement with plan. AMELIE Sanon, aware of patient arrival and admission data. Orders for UA and wet prep and continue to monitor.

## 2020-11-29 NOTE — DISCHARGE INSTRUCTIONS
Discharge Instruction for Undelivered Patients      You were seen for: Abdominal Pain  We Consulted: AMELIE Sanon  You had (Test or Medicine):Fetal and uterine monitoring, wet prep, and urine assessment     Diet:   Drink 8 to 12 glasses of liquids (milk, juice, water) every day.  You may eat meals and snacks.     Activity:  Count fetal kicks everyday (see handout)  Call your doctor or nurse midwife if your baby is moving less than usual.     Call your provider if you notice:  Swelling in your face or increased swelling in your hands or legs.  Headaches that are not relieved by Tylenol (acetaminophen).  Changes in your vision (blurring: seeing spots or stars.)  Nausea (sick to your stomach) and vomiting (throwing up).   Weight gain of 5 pounds or more per week.  Heartburn that doesn't go away.  Signs of bladder infection: pain when you urinate (use the toilet), need to go more often and more urgently.  The bag of ventura (rupture of membranes) breaks, or you notice leaking in your underwear.  Bright red blood in your underwear.  Abdominal (lower belly) or stomach pain.  For first baby: Contractions (tightening) less than 5 minutes apart for one hour or more.  *If less than 34 weeks: Contractions (tightenings) more than 6 times in one hour.  Increase or change in vaginal discharge (note the color and amount)      Follow-up:  As scheduled in the clinic

## 2020-11-30 LAB
BACTERIA SPEC CULT: NORMAL
BACTERIA SPEC CULT: NORMAL
Lab: NORMAL
SPECIMEN SOURCE: NORMAL

## 2020-12-21 ENCOUNTER — PRENATAL OFFICE VISIT (OUTPATIENT)
Dept: OBGYN | Facility: CLINIC | Age: 30
End: 2020-12-21
Payer: COMMERCIAL

## 2020-12-21 VITALS — WEIGHT: 155 LBS | BODY MASS INDEX: 25.02 KG/M2 | DIASTOLIC BLOOD PRESSURE: 80 MMHG | SYSTOLIC BLOOD PRESSURE: 122 MMHG

## 2020-12-21 DIAGNOSIS — O99.012 ANEMIA DURING PREGNANCY IN SECOND TRIMESTER: ICD-10-CM

## 2020-12-21 DIAGNOSIS — O23.42 RECURRENT URINARY TRACT INFECTION AFFECTING PREGNANCY IN SECOND TRIMESTER: ICD-10-CM

## 2020-12-21 DIAGNOSIS — O99.282 HYPERTHYROIDISM AFFECTING PREGNANCY IN SECOND TRIMESTER: ICD-10-CM

## 2020-12-21 DIAGNOSIS — R94.6 THYROID FUNCTION TEST ABNORMAL: ICD-10-CM

## 2020-12-21 DIAGNOSIS — R03.0 ELEVATED BP WITHOUT DIAGNOSIS OF HYPERTENSION: ICD-10-CM

## 2020-12-21 DIAGNOSIS — Z34.02 SUPERVISION OF NORMAL FIRST PREGNANCY IN SECOND TRIMESTER: Primary | ICD-10-CM

## 2020-12-21 DIAGNOSIS — O43.199 MARGINAL INSERTION OF UMBILICAL CORD AFFECTING MANAGEMENT OF MOTHER: ICD-10-CM

## 2020-12-21 DIAGNOSIS — E05.90 HYPERTHYROIDISM AFFECTING PREGNANCY IN SECOND TRIMESTER: ICD-10-CM

## 2020-12-21 PROCEDURE — 99207 PR PRENATAL VISIT: CPT | Performed by: ADVANCED PRACTICE MIDWIFE

## 2020-12-21 PROCEDURE — 90471 IMMUNIZATION ADMIN: CPT | Performed by: ADVANCED PRACTICE MIDWIFE

## 2020-12-21 PROCEDURE — 90715 TDAP VACCINE 7 YRS/> IM: CPT | Performed by: ADVANCED PRACTICE MIDWIFE

## 2020-12-21 NOTE — PATIENT INSTRUCTIONS
"PREECLAMPSIA SIGNS AND SYMPTOMS    Preeclampsia is a dangerous condition that some women develop in the second half of pregnancy. It can also begin after the baby is born.  Preeclampsia causes high blood pressure and can cause problems with many organ systems in your body.  It can also affect the growth of your baby. The exact cause of preeclampsia is unknown, however, there are signs and symptoms to watch for:    -A bad headache that doesn't improve with Tylenol  -Visual changes such as spots, flashes of light, blurry vision  -Pain in the upper right part of your abdomen, especially under the ribs that doesn't go away  -Nausea and/or vomiting  -Feeling extremely tired  -Yellowing of the skin and/or eyes  -Feeling \"not quite right\" or that something is wrong  -An extreme amount of swelling (some swelling in pregnancy is very normal)    If your midwife feels that you are developing preeclampsia, you will have lab tests drawn and will be monitored very closely.     If you are experiencing anyof these symptoms,   Call [facility]   [phone]    Prenatal classes:  Https://[employer]/    "

## 2020-12-21 NOTE — PROGRESS NOTES
S: Feels well,  Baby active.  Denies uterine cramping, vaginal bleeding or leaking of fluid    She was seen in LD on 11/28 d/t contractions. She was treated for presumed UTI with Macrobid, however the urine culture showed >100,000 mixed urogenital brianne. She states she was not having any symptoms of UTI. She denies any further contractions. When being assessed in triage, she was noted to have BP of 135/90, then 135/84 on recheck.     She has questions about birthing classes and was directed to [employer] website, where there are virtual classes being offered.     She states she still has not followed up with endocrinology.     O: Vitals: /80 (BP Location: Right arm, Cuff Size: Adult Regular)   Wt 70.3 kg (155 lb)   LMP 06/09/2020   BMI 25.02 kg/m    BMI= Body mass index is 25.02 kg/m .  Exam:  Constitutional: healthy, alert and no distress  Respiratory: respirations even and unlabored  Gastrointestinal: Abdomen soft, non-tender. Fundus measures appropriate for gestational age. Fetal heart tones hear without difficulty and within normal limits  : Deferred  Psychiatric: mentation appears normal and affect normal/bright      A/P:   1. (Z34.02) Supervision of normal first pregnancy in second trimester  (primary encounter diagnosis)  Plan: TDAP VACCINE (Adacel, Boostrix)  [[id number]],         CANCELED: Glucose tolerance, gest screen, 1         hour, CANCELED: CBC with platelets, CANCELED:         Treponema Abs w Reflex to RPR and Titer,         CANCELED: TSH, CANCELED: T4, free    2. (R94.6) Thyroid function test abnormal  Plan: CANCELED: TSH, CANCELED: T4, free    3. (O99.282,  E05.90) Hyperthyroidism affecting pregnancy in second trimester  Plan: ENDOCRINOLOGY ADULT REFERRAL  - referred to endocrinology; has not had initial consult yet  - last TSH 0.27 11/23/20  - plan recheck TSH/T4 at 28 weeks [ ]  - referral re-sent to endocrinology     4. (O99.012) Anemia during pregnancy in second trimester  - Hgb 10.6  - On  iron  - Recheck at 28wk [ ] and 36wk [ ]    5. (O43.199) Marginal insertion of umbilical cord affecting management of mother    - followed by MFM    - serial growth ultrasounds at 28 weeks and 34 weeks    6. (O23.42) Recurrent urinary tract infection affecting pregnancy in second trimester   - Positive UA 20, treated with Macrobid, UC showed urogenital brianne    - Positive UA, UC positive staphylococcus saprophyticus 20, treated with  Macrobid  -  UA suggestive of UTI 20, patient asymptomatic, UC negative 20  - Treated for presumed UTI , UC >100,000 urogenital brianne    - Only one UC-confirmed UTI this pregnancy. Consider abx prophylaxis for remainer of pregnancy with another positive UC    7. (R03.0) Elevated BP without diagnosis of hypertension  -  triage visit with BP of 135/90, then 135/84 on recheck.   - Continue to monitor BP closely. Discussed s/s preelcampsia  - Briefly discussed gestational htn and preeclampsia       28wk folder reviewed:  Discussed fetal movement kick counts and s/s labor and  labor. Reviewed how to contact on call CNM by texting pager.   EPDS: 4  Breastfeeding: yes  Pain control: epidural     GCT/repeat RPR today. Tdap given; reviewed CDC recommendations and partner/family vaccination recommended as well.  Need for Rhogam? No.     Pt vomited after 1hr glucose, scheduled to come back later this week for repeat  GTT and collection of remainder of labs    Encouraged patient to call with any questions or concerns.  Return to clinic 2 weeks    SWAPNIL Pires, AMELIE

## 2020-12-26 ENCOUNTER — NURSE TRIAGE (OUTPATIENT)
Dept: NURSING | Facility: CLINIC | Age: 30
End: 2020-12-26

## 2020-12-26 ENCOUNTER — HOSPITAL ENCOUNTER (OUTPATIENT)
Facility: CLINIC | Age: 30
End: 2020-12-26
Admitting: ADVANCED PRACTICE MIDWIFE
Payer: COMMERCIAL

## 2020-12-26 ENCOUNTER — TELEPHONE (OUTPATIENT)
Dept: OBGYN | Facility: CLINIC | Age: 30
End: 2020-12-26

## 2020-12-26 ENCOUNTER — HOSPITAL ENCOUNTER (OUTPATIENT)
Facility: CLINIC | Age: 30
Discharge: HOME | End: 2020-12-26
Attending: ADVANCED PRACTICE MIDWIFE | Admitting: ADVANCED PRACTICE MIDWIFE
Payer: COMMERCIAL

## 2020-12-26 VITALS
HEART RATE: 92 BPM | SYSTOLIC BLOOD PRESSURE: 128 MMHG | HEIGHT: 66 IN | DIASTOLIC BLOOD PRESSURE: 79 MMHG | TEMPERATURE: 97.9 F | WEIGHT: 150 LBS | RESPIRATION RATE: 18 BRPM | BODY MASS INDEX: 24.11 KG/M2

## 2020-12-26 DIAGNOSIS — O47.9 UTERINE CONTRACTIONS DURING PREGNANCY: Primary | ICD-10-CM

## 2020-12-26 PROBLEM — O36.8190 DECREASED FETAL MOVEMENT: Status: ACTIVE | Noted: 2020-12-26

## 2020-12-26 LAB
ALBUMIN UR-MCNC: NEGATIVE MG/DL
APPEARANCE UR: CLEAR
BACTERIA #/AREA URNS HPF: ABNORMAL /HPF
BILIRUB UR QL STRIP: NEGATIVE
COLOR UR AUTO: ABNORMAL
FIBRONECTIN FETAL VAG QL: NEGATIVE
GLUCOSE UR STRIP-MCNC: NEGATIVE MG/DL
HGB UR QL STRIP: NEGATIVE
KETONES UR STRIP-MCNC: NEGATIVE MG/DL
LEUKOCYTE ESTERASE UR QL STRIP: ABNORMAL
NITRATE UR QL: NEGATIVE
PH UR STRIP: 7 PH (ref 5–7)
RBC #/AREA URNS AUTO: 1 /HPF (ref 0–2)
SOURCE: ABNORMAL
SP GR UR STRIP: 1.01 (ref 1–1.03)
SPECIMEN SOURCE: ABNORMAL
SQUAMOUS #/AREA URNS AUTO: 7 /HPF (ref 0–1)
TRANS CELLS #/AREA URNS HPF: <1 /HPF (ref 0–1)
UROBILINOGEN UR STRIP-MCNC: NORMAL MG/DL (ref 0–2)
WBC #/AREA URNS AUTO: 2 /HPF (ref 0–5)
WET PREP SPEC: ABNORMAL

## 2020-12-26 PROCEDURE — 82731 ASSAY OF FETAL FIBRONECTIN: CPT | Performed by: ADVANCED PRACTICE MIDWIFE

## 2020-12-26 PROCEDURE — 87210 SMEAR WET MOUNT SALINE/INK: CPT | Performed by: ADVANCED PRACTICE MIDWIFE

## 2020-12-26 PROCEDURE — 81001 URINALYSIS AUTO W/SCOPE: CPT | Performed by: ADVANCED PRACTICE MIDWIFE

## 2020-12-26 PROCEDURE — 87086 URINE CULTURE/COLONY COUNT: CPT | Performed by: ADVANCED PRACTICE MIDWIFE

## 2020-12-26 PROCEDURE — G0463 HOSPITAL OUTPT CLINIC VISIT: HCPCS

## 2020-12-26 PROCEDURE — 59025 FETAL NON-STRESS TEST: CPT

## 2020-12-26 PROCEDURE — 59025 FETAL NON-STRESS TEST: CPT | Performed by: OBSTETRICS & GYNECOLOGY

## 2020-12-26 RX ORDER — ONDANSETRON 2 MG/ML
4 INJECTION INTRAMUSCULAR; INTRAVENOUS EVERY 6 HOURS PRN
Status: DISCONTINUED | OUTPATIENT
Start: 2020-12-26 | End: 2020-12-26 | Stop reason: HOSPADM

## 2020-12-26 RX ORDER — CEPHALEXIN 500 MG/1
500 CAPSULE ORAL 2 TIMES DAILY
Qty: 10 CAPSULE | Refills: 0 | Status: SHIPPED | OUTPATIENT
Start: 2020-12-26 | End: 2020-12-31

## 2020-12-26 RX ORDER — METRONIDAZOLE 500 MG/1
500 TABLET ORAL 2 TIMES DAILY
Qty: 14 TABLET | Refills: 0 | Status: SHIPPED | OUTPATIENT
Start: 2020-12-26 | End: 2021-01-02

## 2020-12-26 ASSESSMENT — MIFFLIN-ST. JEOR: SCORE: 1417.15

## 2020-12-26 NOTE — TELEPHONE ENCOUNTER
Pt calls in     Is 28 weeks pregnant   Delivering at [facility]  Via mid-wife    2nd call today -regarding decreased fetal movement     Earlier was 6 hours -no mvt  Now - 8 hours with no mvt     Pt with NO other symptoms > No vag bleed -abd pain - fever - etc     Paging  for [facility] Mid-wife's called   Will have mid-wife Kinjal Tolbertutcatalina call pt back directly at [phone] for 2nd level triage   (5:08 pm)    Protocol and care advice reviewed  Caller states understanding of the recommended disposition  Advised to call back if further questions or concerns    Nixon Oquendo , RN / [facility]        Reason for Disposition    [1] Pregnant 23 or more weeks AND [2] baby moving less today by kick count  (e.g., kick count < 5 in 1 hour or < 10 in 2 hours)    Additional Information    Negative: Sounds like a life-threatening emergency to the triager    Negative: New blurred vision or vision changes    Negative: [1] SEVERE headache AND [2] not relieved with acetaminophen (e.g., Tylenol)    Negative: Leakage of fluid from vagina    Protocols used: PREGNANCY - DECREASED FETAL MOVEMENT-A-AH

## 2020-12-26 NOTE — TELEPHONE ENCOUNTER
"  \"I am 28 weeks pregnant and I haven't felt the baby move in about 6 hours. \"  Denies other sx at this time   Triaged , patient will try eating and drinking something sweet/will lay on left side to monitor and will call back or go in if needed.  Prefers to try this first  Marizol Jacobsen RN [facility]    COVID 19 Nurse Triage Plan/Patient Instructions    Please be aware that novel coronavirus (COVID-19) may be circulating in the community. If you develop symptoms such as fever, cough, or SOB or if you have concerns about the presence of another infection including coronavirus (COVID-19), please contact your health care provider or visit [email or web address].     Disposition/Instructions    Additional COVID19 information to add for patients.   How can I protect others?  If you have symptoms (fever, cough, body aches or trouble breathing): Stay home and away from others (self-isolate) until:    At least 10 days have passed since your symptoms started, And     You ve had no fever--and no medicine that reduces fever--for 1 full day (24 hours), And      Your other symptoms have resolved (gotten better).     If you don t have symptoms, but a test showed that you have COVID-19 (you tested positive):    Stay home and away from others (self-isolate). Follow the tips under \"How do I self-isolate?\" below for 10 days (20 days if you have a weak immune system).    You don't need to be retested for COVID-19 before going back to school or work. As long as you're fever-free and feeling better, you can go back to school, work and other activities after waiting the 10 or 20 days.     How do I self-isolate?    Stay in your own room, even for meals. Use your own bathroom if you can.     Stay away from others in your home. No hugging, kissing or shaking hands. No visitors.    Don t go to work, school or anywhere else.     Clean  high touch  surfaces often (doorknobs, counters, handles, etc.). Use a household cleaning spray or " wipes. You ll find a full list on the EPA website:  www.epa.gov/pesticide-registration/list-n-disinfectants-use-against-sars-cov-2.    Cover your mouth and nose with a mask, tissue or washcloth to avoid spreading germs.    Wash your hands and face often. Use soap and water.    Caregivers in these groups are at risk for severe illness due to COVID-19:  o People 65 years and older  o People who live in a nursing home or long-term care facility  o People with chronic disease (lung, heart, cancer, diabetes, kidney, liver, immunologic)  o People who have a weakened immune system, including those who:  - Are in cancer treatment  - Take medicine that weakens the immune system, such as corticosteroids  - Had a bone marrow or organ transplant  - Have an immune deficiency  - Have poorly controlled HIV or AIDS  - Are obese (body mass index of 40 or higher)  - Smoke regularly    Caregivers should wear gloves while washing dishes, handling laundry and cleaning bedrooms and bathrooms.    Use caution when washing and drying laundry: Don t shake dirty laundry, and use the warmest water setting that you can.    For more tips, go to www.cdc.gov/coronavirus/2019-ncov/downloads/10Things.pdf.    How can I take care of myself?  1. Get lots of rest. Drink extra fluids (unless a doctor has told you not to).     2. Take Tylenol (acetaminophen) for fever or pain. If you have liver or kidney problems, ask your family doctor if it s okay to take Tylenol.     Adults can take either:     650 mg (two 325 mg pills) every 4 to 6 hours, or     1,000 mg (two 500 mg pills) every 8 hours as needed.     Note: Don t take more than 3,000 mg in one day.   Acetaminophen is found in many medicines (both prescribed and over-the-counter medicines). Read all labels to be sure you don t take too much.     For children, check the Tylenol bottle for the right dose. The dose is based on the child s age or weight.    3. If you have other health problems (like  cancer, heart failure, an organ transplant or severe kidney disease): Call your specialty clinic if you don t feel better in the next 2 days.    4. Know when to call 911: Emergency warning signs include:    Trouble breathing or shortness of breath    Pain or pressure in the chest that doesn t go away    Feeling confused like you haven t felt before, or not being able to wake up    Bluish-colored lips or face    What are the symptoms of COVID-19?     The most common symptoms are cough, fever and trouble breathing.     Less common symptoms include body aches, chills, diarrhea (loose, watery poops), fatigue (feeling very tired), headache, runny nose, sore throat and loss of smell.    COVID-19 can cause severe coughing (bronchitis) and lung infection (pneumonia).    How does it spread?     The virus may spread when a person coughs or sneezes into the air. The virus can travel about 6 feet this way, and it can live on surfaces.      Common  (household disinfectants) will kill the virus.    Who is at risk?  Anyone can catch COVID-19 if they re around someone who has the virus.    How can others protect themselves?     Stay away from people who have COVID-19 (or symptoms of COVID-19).    Wash hands often with soap and water. Or, use hand  with at least 60% alcohol.    Avoid touching the eyes, nose or mouth.     Wear a face mask when you go out in public, when sick or when caring for a sick person.    Where can I get more information?    [facility]: About COVID-19: [email or web address]    CDC: What to Do If You re Sick: www.cdc.gov/coronavirus/2019-ncov/about/steps-when-sick.html    CDC: Ending Home Isolation: www.cdc.gov/coronavirus/2019-ncov/hcp/disposition-in-home-patients.html     CDC: Caring for Someone: www.cdc.gov/coronavirus/2019-ncov/if-you-are-sick/care-for-someone.html     MD: Interim Guidance for Hospital Discharge to Home:  www.[facility]./diseases/coronavirus/hcp/hospdischarge.pdf    [facility] clinical trials (COVID-19 research studies): clinicalaffairs.[facility]-clinical-trials     Below are the COVID-19 hotlines at the [place] [facility] ([facility]). Interpreters are available.   o For health questions: Call 621-618-9073 or 1-665.889.7862 (7 a.m. to 7 p.m.)  o For questions about schools and childcare: Call 703-522-2053 or 1-895.514.9970 (7 a.m. to 7 p.m.)          Thank you for taking steps to prevent the spread of this virus.  o Limit your contact with others.  o Wear a simple mask to cover your cough.  o Wash your hands well and often.    [facility]: About COVID-19: [email or web address]    CDC: What to Do If You're Sick: www.cdc.gov/coronavirus/2019-ncov/about/steps-when-sick.html    CDC: Ending Home Isolation: www.cdc.gov/coronavirus/2019-ncov/hcp/disposition-in-home-patients.html     CDC: Caring for Someone: www.cdc.gov/coronavirus/2019-ncov/if-you-are-sick/care-for-someone.html     [facility]: Interim Guidance for Hospital Discharge to Home: www.[facility]./diseases/coronavirus/hcp/hospdischarge.pdf    [facility] clinical trials (COVID-19 research studies): clinicalaffairs.[facility]-clinical-trials     Below are the COVID-19 hotlines at the [place] [facility] ([facility]). Interpreters are available.   o For health questions: Call 372-209-8629 or 1-357.425.5237 (7 a.m. to 7 p.m.)  o For questions about schools and childcare: Call 729-680-6452 or 1-179.406.3853 (7 a.m. to 7 p.m.)                   Reason for Disposition    [1] Pregnant 23 or more weeks AND [2] baby moving less today by kick count  (e.g., kick count < 5 in 1 hour or < 10 in 2 hours)    Additional Information    Negative: New blurred vision or vision changes    Negative: [1] SEVERE headache AND [2] not relieved with acetaminophen (e.g., Tylenol)    Negative: Leakage of fluid from  vagina    Protocols used: PREGNANCY - DECREASED FETAL MOVEMENT-A-AH

## 2020-12-26 NOTE — TELEPHONE ENCOUNTER
Pt paged on-call provider with reports of decreased fetal movement. States she has only felt one kick in the last 9 hours. Was advised by triage nurse to have some sugar/caffeine and focus on fetal movement which she has done and didn't notice any change. Reports fetus is normally very active during the day, particularly after meals. She denies any other symptoms including LOF, VB or contractions. Pt instructed to present to LDR unit for monitoring, she voiced understanding.  ANDREE Unger CNM 12/26/2020 5:33 PM

## 2020-12-27 NOTE — DISCHARGE INSTRUCTIONS
Discharge Instruction for Undelivered Patients      You were seen for: decreased fetal movement  We Consulted: Kinjal Unger CNM  You had (Test or Medicine): fetal and uterine monitoring, urine analysis, wet prep, fetal fibronectin    Diet:   Drink 8 to 12 glasses of liquids (milk, juice, water) every day.     Activity:  Call your doctor or nurse midwife if your baby is moving less than usual.     Call your provider if you notice:  Swelling in your face or increased swelling in your hands or legs.  Headaches that are not relieved by Tylenol (acetaminophen).  Changes in your vision (blurring: seeing spots or stars.)  Nausea (sick to your stomach) and vomiting (throwing up).   Weight gain of 5 pounds or more per week.  Heartburn that doesn't go away.  Signs of bladder infection: pain when you urinate (use the toilet), need to go more often and more urgently.  The bag of ventura (rupture of membranes) breaks, or you notice leaking in your underwear.  Bright red blood in your underwear.  Abdominal (lower belly) or stomach pain.  Contractions (tightenings) more than 6 times in one hour.  Increase or change in vaginal discharge (note the color and amount)    Follow-up:  As scheduled in the clinic

## 2020-12-27 NOTE — PROVIDER NOTIFICATION
12/26/20 2020   Provider Notification   Provider Name/Title AMELIE Layton   Method of Notification Phone   Request Evaluate in Person   Notification Reason Lab/Diagnostic Study  (+ UTI and BV)     Discharge orders received.  RX sent by CNM for antibiotics to [facility].

## 2020-12-27 NOTE — PROVIDER NOTIFICATION
12/26/20 184   Provider Notification   Provider Name/Title Kinjal BILLM   Method of Notification Phone   Request Evaluate - Remote   Notification Reason Patient Arrived;Uterine Activity;Status Update   MD updated on patient arrival. Patient now feeling baby move. FHT's moderate variability, accelerations present, no decelerations. Patient john every 1-4 minutes, palpate mild, patient states she is not feeling them. History of recurrent UTI's. Orders received for UA, wet prep, fetal fibronectin, and push oral fluids.

## 2020-12-27 NOTE — PLAN OF CARE
Data: Patient assessed in the [facility] for decreased fetal movement.  Cervical exam not examined.  Membranes intact.  Contractions irregular, patient not feeling.  Action:  Presumed adequate fetal oxygenation documented (see flow record). Discharge instructions reviewed.  Patient instructed to report change in fetal movement, vaginal leaking of fluid or bleeding, abdominal pain, or any concerns related to the pregnancy to her nurse/physician.    Response: Orders to discharge home per Nadia Unger.  Patient verbalized understanding of education and verbalized agreement with plan. Discharged to home at 2040.

## 2020-12-27 NOTE — PLAN OF CARE
Data: Patient presented to [facility]: 2020  5:59 PM.  Reason for maternal/fetal assessment is decreased fetal movement. Patient reports she has only felt the baby move twice since yesterday and has not felt any movements since 0900 this morning.  Patient is a .  Prenatal record reviewed. Pregnancy has been uncomplicated..  Gestational Age 28w4d. VSS. Fetal movement present. Patient denies uterine contractions, leaking of vaginal fluid/rupture of membranes, vaginal bleeding, abdominal pain, pelvic pressure, nausea, vomiting, headache, visual disturbances, epigastric or URQ pain, significant edema. Support person is present.   Action: Verbal consent for EFM. Triage assessment completed. Bill of rights reviewed.  Response: Patient verbalized agreement with plan. Will contact Nadia Unger CNM with update and further orders.

## 2020-12-28 LAB
BACTERIA SPEC CULT: NORMAL
BACTERIA SPEC CULT: NORMAL
Lab: NORMAL
SPECIMEN SOURCE: NORMAL

## 2021-01-04 ENCOUNTER — OFFICE VISIT (OUTPATIENT)
Dept: MATERNAL FETAL MEDICINE | Facility: CLINIC | Age: 31
End: 2021-01-04
Attending: OBSTETRICS & GYNECOLOGY
Payer: COMMERCIAL

## 2021-01-04 ENCOUNTER — HOSPITAL ENCOUNTER (OUTPATIENT)
Dept: ULTRASOUND IMAGING | Facility: CLINIC | Age: 31
End: 2021-01-04
Attending: OBSTETRICS & GYNECOLOGY
Payer: COMMERCIAL

## 2021-01-04 ENCOUNTER — HEALTH MAINTENANCE LETTER (OUTPATIENT)
Age: 31
End: 2021-01-04

## 2021-01-04 DIAGNOSIS — O43.199 MARGINAL INSERTION OF UMBILICAL CORD AFFECTING MANAGEMENT OF MOTHER: ICD-10-CM

## 2021-01-04 DIAGNOSIS — O43.199 MARGINAL INSERTION OF UMBILICAL CORD AFFECTING MANAGEMENT OF MOTHER: Primary | ICD-10-CM

## 2021-01-04 PROCEDURE — 76816 OB US FOLLOW-UP PER FETUS: CPT | Mod: 26 | Performed by: OBSTETRICS & GYNECOLOGY

## 2021-01-04 PROCEDURE — 76816 OB US FOLLOW-UP PER FETUS: CPT

## 2021-01-04 NOTE — PROGRESS NOTES
"Please see \"Imaging\" tab under \"Chart Review\" for details of today's US.    Melody Mckinnon, DO    "

## 2021-01-14 ENCOUNTER — PRENATAL OFFICE VISIT (OUTPATIENT)
Dept: OBGYN | Facility: CLINIC | Age: 31
End: 2021-01-14
Payer: COMMERCIAL

## 2021-01-14 VITALS — BODY MASS INDEX: 25.66 KG/M2 | WEIGHT: 159 LBS | DIASTOLIC BLOOD PRESSURE: 80 MMHG | SYSTOLIC BLOOD PRESSURE: 118 MMHG

## 2021-01-14 DIAGNOSIS — O99.013 ANEMIA DURING PREGNANCY IN THIRD TRIMESTER: ICD-10-CM

## 2021-01-14 DIAGNOSIS — E05.90 HYPERTHYROIDISM AFFECTING PREGNANCY IN THIRD TRIMESTER: ICD-10-CM

## 2021-01-14 DIAGNOSIS — O99.283 HYPERTHYROIDISM AFFECTING PREGNANCY IN THIRD TRIMESTER: ICD-10-CM

## 2021-01-14 DIAGNOSIS — Z34.93 PRENATAL CARE IN THIRD TRIMESTER: Primary | ICD-10-CM

## 2021-01-14 LAB
ERYTHROCYTE [DISTWIDTH] IN BLOOD BY AUTOMATED COUNT: 13 % (ref 10–15)
HCT VFR BLD AUTO: 30.1 % (ref 35–47)
HGB BLD-MCNC: 10 G/DL (ref 11.7–15.7)
MCH RBC QN AUTO: 31.6 PG (ref 26.5–33)
MCHC RBC AUTO-ENTMCNC: 33.2 G/DL (ref 31.5–36.5)
MCV RBC AUTO: 95 FL (ref 78–100)
PLATELET # BLD AUTO: 129 10E9/L (ref 150–450)
RBC # BLD AUTO: 3.36 10E12/L (ref 3.8–5.2)
WBC # BLD AUTO: 8.6 10E9/L (ref 4–11)

## 2021-01-14 PROCEDURE — 84443 ASSAY THYROID STIM HORMONE: CPT | Performed by: ADVANCED PRACTICE MIDWIFE

## 2021-01-14 PROCEDURE — 82950 GLUCOSE TEST: CPT | Performed by: ADVANCED PRACTICE MIDWIFE

## 2021-01-14 PROCEDURE — 82728 ASSAY OF FERRITIN: CPT | Performed by: ADVANCED PRACTICE MIDWIFE

## 2021-01-14 PROCEDURE — 99207 PR PRENATAL VISIT: CPT | Performed by: ADVANCED PRACTICE MIDWIFE

## 2021-01-14 PROCEDURE — 84439 ASSAY OF FREE THYROXINE: CPT | Performed by: ADVANCED PRACTICE MIDWIFE

## 2021-01-14 PROCEDURE — 36415 COLL VENOUS BLD VENIPUNCTURE: CPT | Performed by: ADVANCED PRACTICE MIDWIFE

## 2021-01-14 PROCEDURE — 85027 COMPLETE CBC AUTOMATED: CPT | Performed by: ADVANCED PRACTICE MIDWIFE

## 2021-01-14 PROCEDURE — 86780 TREPONEMA PALLIDUM: CPT | Mod: 90 | Performed by: ADVANCED PRACTICE MIDWIFE

## 2021-01-14 PROCEDURE — 99000 SPECIMEN HANDLING OFFICE-LAB: CPT | Performed by: ADVANCED PRACTICE MIDWIFE

## 2021-01-14 NOTE — NURSING NOTE
"Chief Complaint   Patient presents with     Prenatal Care       Initial /80 (BP Location: Right arm, Cuff Size: Adult Regular)   Wt 72.1 kg (159 lb)   LMP 2020   BMI 25.66 kg/m   Estimated body mass index is 25.66 kg/m  as calculated from the following:    Height as of 20: 1.676 m (5' 6\").    Weight as of this encounter: 72.1 kg (159 lb).  BP completed using cuff size: regular    Questioned patient about current smoking habits.  Pt. has never smoked.          Frandy Munoz MA             "

## 2021-01-14 NOTE — PATIENT INSTRUCTIONS
Weeks 27 thru 32 - Gestational Age (Fetal Age - Weeks 25 thru 30):  The fetus really fills out over these next few weeks, storing fat on the body, reaching about 15-17 inches long and weighing about 4-4   lbs by the 32nd week. The lungs are not fully mature yet, but some rhythmic breathing movements are occurring. The bones are fully developed, but are still soft and pliable. The fetus is storing its own calcium, iron and phosphorus. The eyelids open after being closed, since the end of the first trimester.      Weeks 33 thru 36 - Gestational Age (Fetal Age - Weeks 31 thru 34):  This is about the time that the fetus will descend into the head down position preparing for birth. The fetus is beginning to gain weight more rapidly. The lanugo hair will disappear from the skin, and it is becoming less red and wrinkled. The fetus is now 16-19 inches and weighs anywhere from 5   lbs to 6   lbs.

## 2021-01-14 NOTE — PROGRESS NOTES
S: Feels well. Endorses occasional BH, mild swelling in hands and ankle swelling that resolves with rest and hydration.  Baby active.  Denies vaginal bleeding or LOF.    O: Vitals: /80 (BP Location: Right arm, Cuff Size: Adult Regular)   Wt 72.1 kg (159 lb)   LMP 06/09/2020   BMI 25.66 kg/m    BMI= Body mass index is 25.66 kg/m .  Exam:  Constitutional: Healthy, alert and no distress  Respiratory: Respirations even and unlabored  Gastrointestinal: Abdomen soft, non-tender. Fundus measures appropriate for gestational age. Fetal heart tones heard without difficulty and within normal limits.  : Deferred  Psychiatric: Mentation appears normal and affect normal/bright    A:     ICD-10-CM    1. Prenatal care in third trimester  Z34.93 Glucose tolerance, gest screen, 1 hour     Treponema Abs w Reflex to RPR and Titer     CBC with platelets   2. Hyperthyroidism affecting pregnancy in third trimester  O99.283 TSH    E05.90 T4, free       P: (Z34.93) Prenatal care in third trimester  (primary encounter diagnosis)  - GCT today. Was not able to tolerate last visit  - Routine CBC/RPR  - Discussed patient options for postpartum contraception. Was on COCs for several years and was a reliable, consistent pill-taker. Considering POPs for postpartum.  - Reviewed counting fetal movement in third trimester    (O99.283,  E05.90) Hyperthyroidism affecting pregnancy in third trimester  - Reheck TSH, T4 with labs today  - Endocrinology referral active; encourage consult visit if labs abnormal    Sirisha Salvador, SHAINA, APRN, CNM

## 2021-01-15 DIAGNOSIS — R79.0 LOW FERRITIN: ICD-10-CM

## 2021-01-15 DIAGNOSIS — O99.013 ANEMIA DURING PREGNANCY IN THIRD TRIMESTER: ICD-10-CM

## 2021-01-15 LAB
FERRITIN SERPL-MCNC: 8 NG/ML (ref 12–150)
GLUCOSE 1H P 50 G GLC PO SERPL-MCNC: 87 MG/DL (ref 60–129)
T PALLIDUM AB SER QL: NONREACTIVE
T4 FREE SERPL-MCNC: 0.94 NG/DL (ref 0.76–1.46)
TSH SERPL DL<=0.005 MIU/L-ACNC: 0.1 MU/L (ref 0.4–4)

## 2021-01-15 RX ORDER — HEPARIN SODIUM,PORCINE 10 UNIT/ML
5 VIAL (ML) INTRAVENOUS
Status: CANCELLED | OUTPATIENT
Start: 2021-01-18

## 2021-01-15 RX ORDER — HEPARIN SODIUM (PORCINE) LOCK FLUSH IV SOLN 100 UNIT/ML 100 UNIT/ML
5 SOLUTION INTRAVENOUS
Status: CANCELLED | OUTPATIENT
Start: 2021-01-18

## 2021-01-19 ENCOUNTER — OFFICE VISIT (OUTPATIENT)
Dept: LAB | Facility: CLINIC | Age: 31
End: 2021-01-19
Payer: COMMERCIAL

## 2021-01-19 DIAGNOSIS — O99.013 ANEMIA DURING PREGNANCY IN THIRD TRIMESTER: ICD-10-CM

## 2021-01-19 DIAGNOSIS — R79.0 LOW FERRITIN: ICD-10-CM

## 2021-01-19 PROCEDURE — U0003 INFECTIOUS AGENT DETECTION BY NUCLEIC ACID (DNA OR RNA); SEVERE ACUTE RESPIRATORY SYNDROME CORONAVIRUS 2 (SARS-COV-2) (CORONAVIRUS DISEASE [COVID-19]), AMPLIFIED PROBE TECHNIQUE, MAKING USE OF HIGH THROUGHPUT TECHNOLOGIES AS DESCRIBED BY CMS-2020-01-R: HCPCS | Performed by: ADVANCED PRACTICE MIDWIFE

## 2021-01-19 PROCEDURE — U0005 INFEC AGEN DETEC AMPLI PROBE: HCPCS | Performed by: ADVANCED PRACTICE MIDWIFE

## 2021-01-20 LAB
SARS-COV-2 RNA RESP QL NAA+PROBE: NOT DETECTED
SPECIMEN SOURCE: NORMAL

## 2021-01-27 ENCOUNTER — INFUSION THERAPY VISIT (OUTPATIENT)
Dept: INFUSION THERAPY | Facility: CLINIC | Age: 31
End: 2021-01-27
Attending: ADVANCED PRACTICE MIDWIFE
Payer: COMMERCIAL

## 2021-01-27 VITALS
DIASTOLIC BLOOD PRESSURE: 87 MMHG | TEMPERATURE: 97.9 F | RESPIRATION RATE: 16 BRPM | HEART RATE: 94 BPM | OXYGEN SATURATION: 99 % | SYSTOLIC BLOOD PRESSURE: 125 MMHG

## 2021-01-27 DIAGNOSIS — R79.0 LOW FERRITIN: Primary | ICD-10-CM

## 2021-01-27 DIAGNOSIS — O99.013 ANEMIA DURING PREGNANCY IN THIRD TRIMESTER: ICD-10-CM

## 2021-01-27 PROCEDURE — 96374 THER/PROPH/DIAG INJ IV PUSH: CPT

## 2021-01-27 PROCEDURE — 250N000011 HC RX IP 250 OP 636: Performed by: ADVANCED PRACTICE MIDWIFE

## 2021-01-27 PROCEDURE — 258N000003 HC RX IP 258 OP 636: Performed by: ADVANCED PRACTICE MIDWIFE

## 2021-01-27 RX ORDER — HEPARIN SODIUM,PORCINE 10 UNIT/ML
5 VIAL (ML) INTRAVENOUS
Status: CANCELLED | OUTPATIENT
Start: 2021-01-29

## 2021-01-27 RX ORDER — HEPARIN SODIUM (PORCINE) LOCK FLUSH IV SOLN 100 UNIT/ML 100 UNIT/ML
5 SOLUTION INTRAVENOUS
Status: CANCELLED | OUTPATIENT
Start: 2021-01-29

## 2021-01-27 RX ADMIN — IRON SUCROSE 200 MG: 20 INJECTION, SOLUTION INTRAVENOUS at 13:23

## 2021-01-27 NOTE — PROGRESS NOTES
Infusion Nursing Note:  Daniel Steiner presents today for Venofer.    Patient seen by provider today: No   present during visit today: Not Applicable.    Note: N/A.    Intravenous Access:  Peripheral IV placed.    Treatment Conditions:  Not Applicable.      Post Infusion Assessment:  Patient tolerated infusion without incident.  Blood return noted pre and post infusion.  Site patent and intact, free from redness, edema or discomfort.  No evidence of extravasations.  Access discontinued per protocol.       Discharge Plan:   Discharge instructions reviewed with: Patient.  Patient and/or family verbalized understanding of discharge instructions and all questions answered.  Copy of AVS reviewed with patient and/or family.  Patient will return on 1/29/21   for next appointment.  Patient discharged in stable condition accompanied by: self.  Departure Mode: Ambulatory.    Yuki Andino RN

## 2021-01-28 ENCOUNTER — TELEPHONE (OUTPATIENT)
Dept: OBGYN | Facility: CLINIC | Age: 31
End: 2021-01-28

## 2021-01-28 ENCOUNTER — PRENATAL OFFICE VISIT (OUTPATIENT)
Dept: OBGYN | Facility: CLINIC | Age: 31
End: 2021-01-28
Payer: COMMERCIAL

## 2021-01-28 ENCOUNTER — TELEPHONE (OUTPATIENT)
Dept: ENDOCRINOLOGY | Facility: CLINIC | Age: 31
End: 2021-01-28

## 2021-01-28 VITALS — SYSTOLIC BLOOD PRESSURE: 120 MMHG | WEIGHT: 166 LBS | BODY MASS INDEX: 26.79 KG/M2 | DIASTOLIC BLOOD PRESSURE: 82 MMHG

## 2021-01-28 DIAGNOSIS — E05.90 SUBCLINICAL HYPERTHYROIDISM: ICD-10-CM

## 2021-01-28 DIAGNOSIS — R82.90 CLOUDY URINE: ICD-10-CM

## 2021-01-28 DIAGNOSIS — O99.013 ANEMIA DURING PREGNANCY IN THIRD TRIMESTER: Primary | ICD-10-CM

## 2021-01-28 PROBLEM — O99.012 ANEMIA DURING PREGNANCY IN SECOND TRIMESTER: Status: RESOLVED | Noted: 2020-11-22 | Resolved: 2021-01-28

## 2021-01-28 PROBLEM — Z34.02 SUPERVISION OF NORMAL FIRST PREGNANCY IN SECOND TRIMESTER: Status: RESOLVED | Noted: 2020-11-23 | Resolved: 2021-01-28

## 2021-01-28 PROBLEM — O36.8190 DECREASED FETAL MOVEMENT: Status: RESOLVED | Noted: 2020-12-26 | Resolved: 2021-01-28

## 2021-01-28 PROBLEM — O43.199 MARGINAL INSERTION OF UMBILICAL CORD AFFECTING MANAGEMENT OF MOTHER: Status: RESOLVED | Noted: 2020-11-22 | Resolved: 2021-01-28

## 2021-01-28 PROBLEM — Z34.02 ENCOUNTER FOR SUPERVISION OF NORMAL FIRST PREGNANCY IN SECOND TRIMESTER: Status: RESOLVED | Noted: 2020-11-23 | Resolved: 2021-01-28

## 2021-01-28 LAB
ALBUMIN UR-MCNC: NEGATIVE MG/DL
APPEARANCE UR: CLEAR
BACTERIA #/AREA URNS HPF: ABNORMAL /HPF
BILIRUB UR QL STRIP: NEGATIVE
COLOR UR AUTO: YELLOW
GLUCOSE UR STRIP-MCNC: NEGATIVE MG/DL
HGB UR QL STRIP: NEGATIVE
KETONES UR STRIP-MCNC: NEGATIVE MG/DL
LEUKOCYTE ESTERASE UR QL STRIP: ABNORMAL
NITRATE UR QL: NEGATIVE
NON-SQ EPI CELLS #/AREA URNS LPF: ABNORMAL /LPF
PH UR STRIP: 6.5 PH (ref 5–7)
RBC #/AREA URNS AUTO: ABNORMAL /HPF
SOURCE: ABNORMAL
SP GR UR STRIP: 1.01 (ref 1–1.03)
UROBILINOGEN UR STRIP-ACNC: 0.2 EU/DL (ref 0.2–1)
WBC #/AREA URNS AUTO: ABNORMAL /HPF

## 2021-01-28 PROCEDURE — 87086 URINE CULTURE/COLONY COUNT: CPT | Performed by: ADVANCED PRACTICE MIDWIFE

## 2021-01-28 PROCEDURE — 99207 PR PRENATAL VISIT: CPT | Performed by: ADVANCED PRACTICE MIDWIFE

## 2021-01-28 PROCEDURE — 81001 URINALYSIS AUTO W/SCOPE: CPT | Performed by: ADVANCED PRACTICE MIDWIFE

## 2021-01-28 NOTE — TELEPHONE ENCOUNTER
I called endocrinology and explained the situation. I was able to speak to a the  who took down pt's information and will pass it on to endo team to see if she can get in sooner.     Endo will reach out to pt on Friday or Monday if they have availability. If she doesn't hear back from them, please call us back and we can try getting her in with someone from family medicine or .     Thanks  SWAPNIL Pires, CNM

## 2021-01-28 NOTE — NURSING NOTE
"Chief Complaint   Patient presents with     Prenatal Care       Initial /82 (BP Location: Left arm, Cuff Size: Adult Regular)   Wt 75.3 kg (166 lb)   LMP 2020   BMI 26.79 kg/m   Estimated body mass index is 26.79 kg/m  as calculated from the following:    Height as of 20: 1.676 m (5' 6\").    Weight as of this encounter: 75.3 kg (166 lb).  BP completed using cuff size: regular    Questioned patient about current smoking habits.  Pt. has never smoked.          Reports cloudy urine, would like a UA done due to frequent infections    Frandy Munoz MA               "

## 2021-01-28 NOTE — TELEPHONE ENCOUNTER
Patient, who is pregnant, has been referred to endocrinology due to hyperthyroidism. She was not able to get an appointment until after her due date in March. Divine Mcgee CNM would like her to be seen sooner. Please call patient to advise. If you have questions, you can contact Divine Mcgee at [phone]

## 2021-01-28 NOTE — TELEPHONE ENCOUNTER
Pt called endocrine and could not get in until March, which is after her JUSTIN.    Please advise.    Jaimee Ghosh RN

## 2021-01-28 NOTE — PROGRESS NOTES
S: Feels well,  Baby active.  Denies uterine cramping, vaginal bleeding or leaking of fluid.    She reports she has noticed her urine has been cloudy. She desires to check a UA/UC as this has been her only symptom of a UTI in the past.      Had US with MFM on 1/4 d/t marginal cord insertion. EFW 23% and marginal cord has resolved. She does have another growth US scheduled with MFM for 2/2, but is wondering if she needs this as the marginal cord has resolved. Discussed she could call MFM to confirm but typically as growth has been normal and marginal cord resolved, repeat growth US not necessarily indicated.    She also reports she has not heard from endocrinology regarding her referral.       O: Vitals: /82 (BP Location: Left arm, Cuff Size: Adult Regular)   Wt 75.3 kg (166 lb)   LMP 06/09/2020   BMI 26.79 kg/m    BMI= Body mass index is 26.79 kg/m .  Exam:  Constitutional: healthy, alert and no distress  Respiratory: respirations even and unlabored  Gastrointestinal: Abdomen soft, non-tender. Fundus measures appropriate for gestational age. Fetal heart tones hear without difficulty and within normal limits  : Deferred  Psychiatric: mentation appears normal and affect normal/bright  A/P:  1. (O99.013) Anemia during pregnancy in third trimester  (primary encounter diagnosis)  - Hgb 10.0 at 28wk  - has been doing iron infusions  - Recheck iron at 32wk [ ]     2. (R82.90) Cloudy urine  Plan: UA with Microscopic reflex to Culture, Urine         Culture Aerobic Bacterial    3. (E05.90) Subclinical hyperthyroidism  Plan: ENDOCRINOLOGY ADULT REFERRAL  - resent referral to endo and gave pt phone number to call directly to schedule her appointment.   - TSH 0.27 11/23/20, TSH 0.10 T4 0.94 at 28wks    Encouraged patient to call with any questions or concerns.  Return to clinic 2 weeks    SWAPNIL Pires, AMELIE

## 2021-01-29 ENCOUNTER — INFUSION THERAPY VISIT (OUTPATIENT)
Dept: INFUSION THERAPY | Facility: CLINIC | Age: 31
End: 2021-01-29
Attending: ADVANCED PRACTICE MIDWIFE
Payer: COMMERCIAL

## 2021-01-29 VITALS
DIASTOLIC BLOOD PRESSURE: 87 MMHG | HEART RATE: 91 BPM | OXYGEN SATURATION: 97 % | SYSTOLIC BLOOD PRESSURE: 129 MMHG | TEMPERATURE: 97.3 F

## 2021-01-29 DIAGNOSIS — O99.013 ANEMIA DURING PREGNANCY IN THIRD TRIMESTER: ICD-10-CM

## 2021-01-29 DIAGNOSIS — R79.0 LOW FERRITIN: Primary | ICD-10-CM

## 2021-01-29 LAB
BACTERIA SPEC CULT: NO GROWTH
Lab: NORMAL
SPECIMEN SOURCE: NORMAL

## 2021-01-29 PROCEDURE — 250N000011 HC RX IP 250 OP 636: Performed by: ADVANCED PRACTICE MIDWIFE

## 2021-01-29 PROCEDURE — 258N000003 HC RX IP 258 OP 636: Performed by: ADVANCED PRACTICE MIDWIFE

## 2021-01-29 PROCEDURE — 96374 THER/PROPH/DIAG INJ IV PUSH: CPT

## 2021-01-29 RX ORDER — HEPARIN SODIUM,PORCINE 10 UNIT/ML
5 VIAL (ML) INTRAVENOUS
Status: CANCELLED | OUTPATIENT
Start: 2021-01-31

## 2021-01-29 RX ORDER — HEPARIN SODIUM (PORCINE) LOCK FLUSH IV SOLN 100 UNIT/ML 100 UNIT/ML
5 SOLUTION INTRAVENOUS
Status: CANCELLED | OUTPATIENT
Start: 2021-01-31

## 2021-01-29 RX ADMIN — IRON SUCROSE 200 MG: 20 INJECTION, SOLUTION INTRAVENOUS at 14:06

## 2021-01-29 NOTE — PROGRESS NOTES
Infusion Nursing Note:  Daniel Steiner presents today for Venofer #2 of 5  Patient seen by provider today: No   present during visit today: Not Applicable.    Note: N/A.    Patient did meet criteria for an asymptomatic covid-19 PCR test in infusion today. Patient declined the covid-19 test.    Intravenous Access:  Peripheral IV placed.    Treatment Conditions:  Ferritin and Hgb noted from 1/141/21.      Post Infusion Assessment:  Patient tolerated infusion without incident.  Blood return noted pre and post infusion.  Site patent and intact, free from redness, edema or discomfort.  No evidence of extravasations.  Access discontinued per protocol.       Discharge Plan:   Discharge instructions reviewed with: Patient.  Patient and/or family verbalized understanding of discharge instructions and all questions answered.  AVS to patient via [employer].  Patient will return 2/1/21 for Venofer #3 of 5 for next appointment.   Patient discharged in stable condition accompanied by: self.  Departure Mode: Ambulatory.    Veena Barboza RN

## 2021-02-01 ENCOUNTER — INFUSION THERAPY VISIT (OUTPATIENT)
Dept: INFUSION THERAPY | Facility: CLINIC | Age: 31
End: 2021-02-01
Attending: ADVANCED PRACTICE MIDWIFE
Payer: COMMERCIAL

## 2021-02-01 VITALS
TEMPERATURE: 97.1 F | HEART RATE: 109 BPM | SYSTOLIC BLOOD PRESSURE: 115 MMHG | DIASTOLIC BLOOD PRESSURE: 78 MMHG | OXYGEN SATURATION: 99 %

## 2021-02-01 DIAGNOSIS — O99.013 ANEMIA DURING PREGNANCY IN THIRD TRIMESTER: ICD-10-CM

## 2021-02-01 DIAGNOSIS — R79.0 LOW FERRITIN: Primary | ICD-10-CM

## 2021-02-01 PROCEDURE — 258N000003 HC RX IP 258 OP 636: Performed by: ADVANCED PRACTICE MIDWIFE

## 2021-02-01 PROCEDURE — 96374 THER/PROPH/DIAG INJ IV PUSH: CPT

## 2021-02-01 PROCEDURE — 250N000011 HC RX IP 250 OP 636: Performed by: ADVANCED PRACTICE MIDWIFE

## 2021-02-01 RX ORDER — HEPARIN SODIUM,PORCINE 10 UNIT/ML
5 VIAL (ML) INTRAVENOUS
Status: CANCELLED | OUTPATIENT
Start: 2021-02-02

## 2021-02-01 RX ORDER — HEPARIN SODIUM (PORCINE) LOCK FLUSH IV SOLN 100 UNIT/ML 100 UNIT/ML
5 SOLUTION INTRAVENOUS
Status: CANCELLED | OUTPATIENT
Start: 2021-02-02

## 2021-02-01 RX ADMIN — IRON SUCROSE 200 MG: 20 INJECTION, SOLUTION INTRAVENOUS at 12:41

## 2021-02-01 NOTE — PROGRESS NOTES
Infusion Nursing Note:  Daniel Steiner presents today for #3 of 5 Venofer.    Patient seen by provider today: No   present during visit today: Not Applicable.    Note: N/A.  Patient  did meet criteria for an asymptomatic covid-19 PCR test in infusion today. Patient declined the covid-19 test. Last negative test 1/19/21    Intravenous Access:  Peripheral IV placed.    Treatment Conditions:  Not Applicable.  Labs noted from 1/14/21      Post Infusion Assessment:  Patient tolerated infusion without incident.  Blood return noted pre and post infusion.  Site patent and intact, free from redness, edema or discomfort.  No evidence of extravasations.  Access discontinued per protocol.       Discharge Plan:   Discharge instructions reviewed with: Patient.  Patient and/or family verbalized understanding of discharge instructions and all questions answered.  AVS to patient via [employer]  Patient will return 2/3/21 for Venofer #4 of 5 for next appointment.   Patient discharged in stable condition accompanied by: self.  Departure Mode: Ambulatory.    Veena Barboza RN

## 2021-02-01 NOTE — TELEPHONE ENCOUNTER
Message sent via [employer].      Mary Kay Hernandez CMA  [facility]  [place]

## 2021-02-01 NOTE — TELEPHONE ENCOUNTER
At this time scheduled is booked out and I do not have any sooner appointments.    This is an urgent issue and patient my wish to request endo consult at another locations ( [place] / [place]/ [place]/ [place] with other  health providers) or  at [facility] [place] or [facility].    Please inform patient.      Let me know if you have any questions.    Thank you.    Maria Elena Escalona MD

## 2021-02-02 ENCOUNTER — HOSPITAL ENCOUNTER (OUTPATIENT)
Dept: ULTRASOUND IMAGING | Facility: CLINIC | Age: 31
End: 2021-02-02
Attending: OBSTETRICS & GYNECOLOGY
Payer: COMMERCIAL

## 2021-02-02 ENCOUNTER — OFFICE VISIT (OUTPATIENT)
Dept: MATERNAL FETAL MEDICINE | Facility: CLINIC | Age: 31
End: 2021-02-02
Attending: OBSTETRICS & GYNECOLOGY
Payer: COMMERCIAL

## 2021-02-02 DIAGNOSIS — E05.90 SUBCLINICAL HYPERTHYROIDISM: Primary | ICD-10-CM

## 2021-02-02 DIAGNOSIS — O43.199 MARGINAL INSERTION OF UMBILICAL CORD AFFECTING MANAGEMENT OF MOTHER: ICD-10-CM

## 2021-02-02 DIAGNOSIS — O43.199 MARGINAL INSERTION OF UMBILICAL CORD AFFECTING MANAGEMENT OF MOTHER: Primary | ICD-10-CM

## 2021-02-02 PROCEDURE — 76816 OB US FOLLOW-UP PER FETUS: CPT

## 2021-02-02 PROCEDURE — 76816 OB US FOLLOW-UP PER FETUS: CPT | Mod: 26 | Performed by: OBSTETRICS & GYNECOLOGY

## 2021-02-02 NOTE — PROGRESS NOTES
Please refer to ultrasound report under 'Imaging' Studies of 'Chart Review' tabs.    Jose Goodman M.D.

## 2021-02-03 ENCOUNTER — INFUSION THERAPY VISIT (OUTPATIENT)
Dept: INFUSION THERAPY | Facility: CLINIC | Age: 31
End: 2021-02-03
Attending: ADVANCED PRACTICE MIDWIFE
Payer: COMMERCIAL

## 2021-02-03 VITALS
SYSTOLIC BLOOD PRESSURE: 116 MMHG | OXYGEN SATURATION: 97 % | RESPIRATION RATE: 16 BRPM | TEMPERATURE: 97.3 F | HEART RATE: 108 BPM | DIASTOLIC BLOOD PRESSURE: 77 MMHG

## 2021-02-03 DIAGNOSIS — O99.013 ANEMIA DURING PREGNANCY IN THIRD TRIMESTER: ICD-10-CM

## 2021-02-03 DIAGNOSIS — R79.0 LOW FERRITIN: Primary | ICD-10-CM

## 2021-02-03 PROCEDURE — 250N000011 HC RX IP 250 OP 636: Performed by: ADVANCED PRACTICE MIDWIFE

## 2021-02-03 PROCEDURE — 96365 THER/PROPH/DIAG IV INF INIT: CPT

## 2021-02-03 PROCEDURE — 258N000003 HC RX IP 258 OP 636: Performed by: ADVANCED PRACTICE MIDWIFE

## 2021-02-03 RX ORDER — HEPARIN SODIUM,PORCINE 10 UNIT/ML
5 VIAL (ML) INTRAVENOUS
Status: CANCELLED | OUTPATIENT
Start: 2021-02-05

## 2021-02-03 RX ORDER — HEPARIN SODIUM (PORCINE) LOCK FLUSH IV SOLN 100 UNIT/ML 100 UNIT/ML
5 SOLUTION INTRAVENOUS
Status: CANCELLED | OUTPATIENT
Start: 2021-02-05

## 2021-02-03 RX ADMIN — IRON SUCROSE 200 MG: 20 INJECTION, SOLUTION INTRAVENOUS at 12:40

## 2021-02-04 ENCOUNTER — HOSPITAL ENCOUNTER (OUTPATIENT)
Facility: CLINIC | Age: 31
Discharge: HOME | End: 2021-02-04
Attending: ADVANCED PRACTICE MIDWIFE | Admitting: ADVANCED PRACTICE MIDWIFE
Payer: COMMERCIAL

## 2021-02-04 ENCOUNTER — APPOINTMENT (OUTPATIENT)
Dept: ULTRASOUND IMAGING | Facility: CLINIC | Age: 31
End: 2021-02-04
Attending: ADVANCED PRACTICE MIDWIFE
Payer: COMMERCIAL

## 2021-02-04 ENCOUNTER — TELEPHONE (OUTPATIENT)
Dept: OBGYN | Facility: CLINIC | Age: 31
End: 2021-02-04

## 2021-02-04 ENCOUNTER — HOSPITAL ENCOUNTER (OUTPATIENT)
Facility: CLINIC | Age: 31
End: 2021-02-04
Admitting: ADVANCED PRACTICE MIDWIFE
Payer: COMMERCIAL

## 2021-02-04 VITALS
RESPIRATION RATE: 16 BRPM | SYSTOLIC BLOOD PRESSURE: 126 MMHG | DIASTOLIC BLOOD PRESSURE: 86 MMHG | BODY MASS INDEX: 25.71 KG/M2 | TEMPERATURE: 98.2 F | HEIGHT: 66 IN | WEIGHT: 160 LBS

## 2021-02-04 DIAGNOSIS — N30.01 ACUTE CYSTITIS WITH HEMATURIA: Primary | ICD-10-CM

## 2021-02-04 PROBLEM — G43.909 MIGRAINE: Status: ACTIVE | Noted: 2017-07-09

## 2021-02-04 PROBLEM — E05.90 SUBCLINICAL HYPERTHYROIDISM: Status: ACTIVE | Noted: 2020-11-24

## 2021-02-04 PROBLEM — R03.0 ELEVATED BLOOD PRESSURE READING WITHOUT DIAGNOSIS OF HYPERTENSION: Status: ACTIVE | Noted: 2021-02-04

## 2021-02-04 PROBLEM — Z12.4 SCREENING FOR CERVICAL CANCER: Status: ACTIVE | Noted: 2018-05-18

## 2021-02-04 PROBLEM — O09.93 HIGH-RISK PREGNANCY, THIRD TRIMESTER: Status: ACTIVE | Noted: 2021-02-04

## 2021-02-04 PROBLEM — Z12.4 CERVICAL CANCER SCREENING: Status: ACTIVE | Noted: 2021-02-04

## 2021-02-04 LAB
ALBUMIN UR-MCNC: NEGATIVE MG/DL
AMORPH CRY #/AREA URNS HPF: ABNORMAL /HPF
APPEARANCE UR: ABNORMAL
BACTERIA #/AREA URNS HPF: ABNORMAL /HPF
BILIRUB UR QL STRIP: NEGATIVE
COLOR UR AUTO: ABNORMAL
GLUCOSE UR STRIP-MCNC: NEGATIVE MG/DL
HGB UR QL STRIP: ABNORMAL
KETONES UR STRIP-MCNC: NEGATIVE MG/DL
LEUKOCYTE ESTERASE UR QL STRIP: ABNORMAL
NITRATE UR QL: NEGATIVE
PH UR STRIP: 6.5 PH (ref 5–7)
RBC #/AREA URNS AUTO: 6 /HPF (ref 0–2)
SOURCE: ABNORMAL
SP GR UR STRIP: 1 (ref 1–1.03)
SPECIMEN SOURCE: NORMAL
SPERM #/AREA URNS HPF: PRESENT /HPF
SQUAMOUS #/AREA URNS AUTO: 3 /HPF (ref 0–1)
UROBILINOGEN UR STRIP-MCNC: NORMAL MG/DL (ref 0–2)
WBC #/AREA URNS AUTO: 14 /HPF (ref 0–5)
WET PREP SPEC: NORMAL

## 2021-02-04 PROCEDURE — 87653 STREP B DNA AMP PROBE: CPT | Performed by: ADVANCED PRACTICE MIDWIFE

## 2021-02-04 PROCEDURE — G0463 HOSPITAL OUTPT CLINIC VISIT: HCPCS

## 2021-02-04 PROCEDURE — 81001 URINALYSIS AUTO W/SCOPE: CPT | Performed by: ADVANCED PRACTICE MIDWIFE

## 2021-02-04 PROCEDURE — 76770 US EXAM ABDO BACK WALL COMP: CPT

## 2021-02-04 PROCEDURE — 87086 URINE CULTURE/COLONY COUNT: CPT | Performed by: ADVANCED PRACTICE MIDWIFE

## 2021-02-04 PROCEDURE — 87491 CHLMYD TRACH DNA AMP PROBE: CPT | Performed by: ADVANCED PRACTICE MIDWIFE

## 2021-02-04 PROCEDURE — 87591 N.GONORRHOEAE DNA AMP PROB: CPT | Performed by: ADVANCED PRACTICE MIDWIFE

## 2021-02-04 PROCEDURE — 87210 SMEAR WET MOUNT SALINE/INK: CPT | Performed by: ADVANCED PRACTICE MIDWIFE

## 2021-02-04 RX ORDER — ONDANSETRON 2 MG/ML
4 INJECTION INTRAMUSCULAR; INTRAVENOUS EVERY 6 HOURS PRN
Status: DISCONTINUED | OUTPATIENT
Start: 2021-02-04 | End: 2021-02-04 | Stop reason: HOSPADM

## 2021-02-04 RX ORDER — ONDANSETRON 4 MG/1
4-8 TABLET, ORALLY DISINTEGRATING ORAL EVERY 8 HOURS PRN
Qty: 30 TABLET | Refills: 0 | Status: SHIPPED | OUTPATIENT
Start: 2021-02-04 | End: 2021-02-17

## 2021-02-04 RX ORDER — NITROFURANTOIN 25; 75 MG/1; MG/1
100 CAPSULE ORAL 2 TIMES DAILY
Qty: 10 CAPSULE | Refills: 0 | Status: SHIPPED | OUTPATIENT
Start: 2021-02-04 | End: 2021-02-11

## 2021-02-04 ASSESSMENT — MIFFLIN-ST. JEOR: SCORE: 1462.51

## 2021-02-04 NOTE — PROVIDER NOTIFICATION
02/04/21 1046   Provider Notification   Provider Name/Title MADELINE BALLARD CNM   Method of Notification At Bedside   Notification Reason Patient Arrived   MD bedside, discussing POC and gave VORB that pt may eat and drink.

## 2021-02-04 NOTE — TELEPHONE ENCOUNTER
34w2d    Pt calls with bright blood in her urine.  Noticed this morning.  No pain or discomfort with urination or lower back.    Hx of kidney stone 8-9 years ago.    No fever.    Pt is sure it was in her urine, not vaginal bleeding.      Nml fetal movment, some BH but no real cxts.     Next appt is 2/11/21.  Do you want her to come sooner for urine?       Joyce MARTINI R.N.

## 2021-02-04 NOTE — PROGRESS NOTES
"[facility] CNM TRIAGE NOTE    Daniel Steiner is a 30 year old  with and IUP at 34w2d who presents with complaint of bloody urine, mild back pain, and mild nausea.    Patient states baby is active.  Denies ROM   Denies vaginal bleeding  Present OB History at [facility] with the CNMs.    Problems this pregnancy:   1. Anemia, s/p 4 iron transfusions  2. Marginal cord insertion, resolved  3. Subclinical hyperthyroidism, repeat testing and possible referral needed per M  4. UTI in pregnancy  5. Elevated blood pressure in early pregnancy    Denies vaginal symptoms, dysuria, urinary frequency beyond her pregnancy state.    History of kidney stones at age 16 and 21-22, both passed spontaneously. Stuart reports her mother also had kidney stones with both of her pregancies.    ROS:  Patient is alert and oriented  Pertinent history above, otherwise 12 point review of systems negative.    PHYSICAL EXAM:  /86   Temp 98.2  F (36.8  C) (Oral)   Resp 16   Ht 1.676 m (5' 6\")   Wt 72.6 kg (160 lb)   LMP 2020   BMI 25.82 kg/m      FHT's 130 with moderate variability  Accelerations: present   Decelerations:  absent        Contractions: Pt is not john     Abdomen: gravid, nontender  Bloody show: no  Cervix: closed/ 0/ Posterior/ firm/ FLOATING  Membranes are intact   Musculoskeletal: mild, diffuse right-sided flank pain    UA:    Color Urine  Straw      Appearance Urine  Slightly Cloudy     Glucose Urine NEG^Negative mg/dL Negative     Bilirubin Urine NEG^Negative Negative     Ketones Urine NEG^Negative mg/dL Negative     Specific Gravity Urine 1.003 - 1.035 1.003     Blood Urine NEG^Negative LargeAbnormal      pH Urine 5.0 - 7.0 pH 6.5     Protein Albumin Urine NEG^Negative mg/dL Negative     Urobilinogen mg/dL 0.0 - 2.0 mg/dL Normal     Nitrite Urine NEG^Negative Negative     Leukocyte Esterase Urine NEG^Negative LargeAbnormal      Source  Midstream Urine     WBC Urine 0 - 5 /HPF " 14High      RBC Urine 0 - 2 /HPF 6High      Bacteria Urine NEG^Negative /HPF FewAbnormal      Squamous Epithelial /HPF Urine 0 - 1 /HPF 3High      sperm NEG^Negative /HPF PresentAbnormal     Comment: Reviewed, acceptable    Amorphous Crystals NEG^Negative /HPF FewAbnormal       Wet prep: negative  GC/CT pending  GBS pending    Renal ultrasound:     IMPRESSION: Mild right hydronephrosis possibly related to gravid  uterus compressing the distal right ureter. No definite renal calculi.  No evidence of left hydronephrosis.    Reviewed results with on-call OBGYN.    ASSESSMENT :   30 year old  with barroso IUP 34w2d  Possible kidney stones vs. UTI  NST  reactive  GBS unknown and membranes intact    PLAN:  Antibiotics now while culture pending, consider stopping if culture negative  Encouraged lots of fluids to help pass stone if present / not visualized  Tylenol for comfort, zofran for nausea  Fluids / fibrer to help prevent constipation from zofran  Discharge home undelivered  RTC in 1 week for follow up    Teaching done r/t to s/s of labor, SROM, decreased fetal movement, comfort measures in third trimester.  Instructed to please refer to the discharge handouts, the RN triage line or on-call CNM for any questions or concerns.  Pt verbalizes understanding and agreement with current plan of care.    Pebbles Owens, SWAPNIL CNM

## 2021-02-04 NOTE — PROVIDER NOTIFICATION
02/04/21 1330   Provider Notification   Provider Name/Title AMELIE Rogel   Method of Notification At Bedside   Notification Reason Lab/Diagnostic Study   CNM bedside discussing results with pt and will write discharge to home instructions.

## 2021-02-04 NOTE — PROVIDER NOTIFICATION
02/04/21 1250   Provider Notification   Provider Name/Title AMELIE Rogel   Method of Notification Phone   Notification Reason Lab/Diagnostic Study   Updated CNM that US results are back. CNM will review and be by shortly.

## 2021-02-04 NOTE — PLAN OF CARE
Data: Patient presented to [facility]: 2021 10:28 AM.  Reason for maternal/fetal assessment is blood in urine. Patient reports that she had some blood in her urine this am, some bouts of nausea, and intermittent lower back pain.  Patient is a .  Prenatal record reviewed. Pregnancy has been uncomplicated..  Gestational Age 34w2d. VSS. Fetal movement present. Patient denies uterine contractions, leaking of vaginal fluid/rupture of membranes, vaginal bleeding, abdominal pain, pelvic pressure, nausea, vomiting, headache, visual disturbances, epigastric or URQ pain, significant edema. Support person is not present.   Action: Verbal consent for EFM. Triage assessment completed. Bill of rights reviewed.  Response: Patient verbalized agreement with plan. Will contact Dr Pebbles Owens with update and further orders.

## 2021-02-04 NOTE — PLAN OF CARE
Data: Patient assessed in the [facility] for blood in the urine and flank pain.  Cervical exam posterior and closed.  Membranes intact.  Contractions not present.  Action:  Presumed adequate fetal oxygenation documented (see flow record). Discharge instructions reviewed.  Patient instructed to report change in fetal movement, vaginal leaking of fluid or bleeding, abdominal pain, or any concerns related to the pregnancy to her nurse/physician.    Response: Orders to discharge home per Pebbles Owens.  Patient verbalized understanding of education and verbalized agreement with plan. Discharged to home at 1348.

## 2021-02-04 NOTE — PROVIDER NOTIFICATION
02/04/21 1111   Provider Notification   Provider Name/Title AMELIE CORREA,   Method of Notification At Bedside   Notification Reason SVE   SVE done, AMELIE gave VORB to discontinue fetal monitoring at this time.

## 2021-02-04 NOTE — DISCHARGE INSTRUCTIONS
Discharge Instruction for Undelivered Patients      You were seen for: Blood in the urine and kidney pain  We Consulted: Pebbles Owens CNM  You had (Test or Medicine):Lab work and ultrasound     Diet:   Drink 8 to 12 glasses of liquids (milk, juice, water) every day.  You may eat meals and snacks.     Activity:  Count fetal kicks everyday (see handout)  Call your doctor or nurse midwife if your baby is moving less than usual.     Call your provider if you notice:  Swelling in your face or increased swelling in your hands or legs.  Headaches that are not relieved by Tylenol (acetaminophen).  Changes in your vision (blurring: seeing spots or stars.)  Nausea (sick to your stomach) and vomiting (throwing up).   Weight gain of 5 pounds or more per week.  Heartburn that doesn't go away.  Signs of bladder infection: pain when you urinate (use the toilet), need to go more often and more urgently.  The bag of ventura (rupture of membranes) breaks, or you notice leaking in your underwear.  Bright red blood in your underwear.  Abdominal (lower belly) or stomach pain.  For first baby: Contractions (tightening) less than 5 minutes apart for one hour or more.  *If less than 34 weeks: Contractions (tightenings) more than 6 times in one hour.  Increase or change in vaginal discharge (note the color and amount)      Follow-up:  Return to clinic for appointment in 1 week, sooner if problems

## 2021-02-05 ENCOUNTER — INFUSION THERAPY VISIT (OUTPATIENT)
Dept: INFUSION THERAPY | Facility: CLINIC | Age: 31
End: 2021-02-05
Attending: ADVANCED PRACTICE MIDWIFE
Payer: COMMERCIAL

## 2021-02-05 VITALS
HEART RATE: 101 BPM | DIASTOLIC BLOOD PRESSURE: 78 MMHG | TEMPERATURE: 98.5 F | SYSTOLIC BLOOD PRESSURE: 125 MMHG | RESPIRATION RATE: 16 BRPM

## 2021-02-05 DIAGNOSIS — R79.0 LOW FERRITIN: Primary | ICD-10-CM

## 2021-02-05 DIAGNOSIS — O99.013 ANEMIA DURING PREGNANCY IN THIRD TRIMESTER: ICD-10-CM

## 2021-02-05 LAB
BACTERIA SPEC CULT: NORMAL
C TRACH DNA SPEC QL NAA+PROBE: NEGATIVE
GP B STREP DNA SPEC QL NAA+PROBE: NEGATIVE
Lab: NORMAL
N GONORRHOEA DNA SPEC QL NAA+PROBE: NEGATIVE
SPECIMEN SOURCE: NORMAL

## 2021-02-05 PROCEDURE — 96365 THER/PROPH/DIAG IV INF INIT: CPT

## 2021-02-05 PROCEDURE — 258N000003 HC RX IP 258 OP 636: Performed by: ADVANCED PRACTICE MIDWIFE

## 2021-02-05 PROCEDURE — 250N000011 HC RX IP 250 OP 636: Performed by: ADVANCED PRACTICE MIDWIFE

## 2021-02-05 RX ORDER — HEPARIN SODIUM (PORCINE) LOCK FLUSH IV SOLN 100 UNIT/ML 100 UNIT/ML
5 SOLUTION INTRAVENOUS
Status: CANCELLED | OUTPATIENT
Start: 2021-02-05

## 2021-02-05 RX ORDER — HEPARIN SODIUM,PORCINE 10 UNIT/ML
5 VIAL (ML) INTRAVENOUS
Status: CANCELLED | OUTPATIENT
Start: 2021-02-05

## 2021-02-05 RX ADMIN — IRON SUCROSE 200 MG: 20 INJECTION, SOLUTION INTRAVENOUS at 13:13

## 2021-02-05 NOTE — PROGRESS NOTES
Infusion Nursing Note:  Daniel Steiner presents today for venofer.     present during visit today: Not Applicable.    Note: N/A.    Intravenous Access:  Peripheral IV placed.    Treatment Conditions:  NA    Post Lab Assessment:  Patient tolerated infusion    Blood return noted pre and post infusion.  Site patent and intact, free from redness, edema or discomfort.  No evidence of extravasations.  Access  (discontinued)     Discharge Plan:   Patient and/or family verbalized understanding of  instructions and all questions answered.  Patient  to lobby in stable condition accompanied by: self.  Patient to see provider today: No  Departure Mode: Ambulatory.  Petra Paul, RN, RN

## 2021-02-11 ENCOUNTER — PRENATAL OFFICE VISIT (OUTPATIENT)
Dept: OBGYN | Facility: CLINIC | Age: 31
End: 2021-02-11
Payer: COMMERCIAL

## 2021-02-11 VITALS — DIASTOLIC BLOOD PRESSURE: 82 MMHG | BODY MASS INDEX: 27.6 KG/M2 | SYSTOLIC BLOOD PRESSURE: 122 MMHG | WEIGHT: 171 LBS

## 2021-02-11 DIAGNOSIS — O99.013 ANEMIA DURING PREGNANCY IN THIRD TRIMESTER: Primary | ICD-10-CM

## 2021-02-11 DIAGNOSIS — E03.8 SUBCLINICAL HYPOTHYROIDISM: ICD-10-CM

## 2021-02-11 LAB
ERYTHROCYTE [DISTWIDTH] IN BLOOD BY AUTOMATED COUNT: 14 % (ref 10–15)
HCT VFR BLD AUTO: 30.8 % (ref 35–47)
HGB BLD-MCNC: 10.3 G/DL (ref 11.7–15.7)
MCH RBC QN AUTO: 32.3 PG (ref 26.5–33)
MCHC RBC AUTO-ENTMCNC: 33.4 G/DL (ref 31.5–36.5)
MCV RBC AUTO: 97 FL (ref 78–100)
PLATELET # BLD AUTO: 123 10E9/L (ref 150–450)
RBC # BLD AUTO: 3.19 10E12/L (ref 3.8–5.2)
WBC # BLD AUTO: 9.5 10E9/L (ref 4–11)

## 2021-02-11 PROCEDURE — 85027 COMPLETE CBC AUTOMATED: CPT | Performed by: ADVANCED PRACTICE MIDWIFE

## 2021-02-11 PROCEDURE — 36415 COLL VENOUS BLD VENIPUNCTURE: CPT | Performed by: ADVANCED PRACTICE MIDWIFE

## 2021-02-11 PROCEDURE — 99207 PR PRENATAL VISIT: CPT | Performed by: ADVANCED PRACTICE MIDWIFE

## 2021-02-11 NOTE — NURSING NOTE
"Chief Complaint   Patient presents with     Prenatal Care     35 weeks and 2 days       Initial /82 (BP Location: Left arm, Cuff Size: Adult Regular)   Wt 77.6 kg (171 lb)   LMP 2020   BMI 27.60 kg/m   Estimated body mass index is 27.6 kg/m  as calculated from the following:    Height as of 21: 1.676 m (5' 6\").    Weight as of this encounter: 77.6 kg (171 lb).  BP completed using cuff size: regular    Questioned patient about current smoking habits.  Pt. has never smoked.          Frandy Munoz MA             "

## 2021-02-11 NOTE — PROGRESS NOTES
"S: Feels well,  Baby active.  Denies uterine cramping, vaginal bleeding or leaking of fluid    Was seen in LD on 2/4 due to hematuria. She had a renal US and was treated presumptively for UTI, however her culture results were WNL. She did complete her course of Macrobid. Denies any hematuria or dysuria since.     Renal US showed:  IMPRESSION: Mild right hydronephrosis possibly related to gravid  uterus compressing the distal right ureter. No definite renal calculi.  No evidence of left hydronephrosis.    Pt reports she was unable to get scheduled with endocrinology, and their earliest available appointments were not until late March. She has rare heart palpitations but reports she is otherwise asymptomatic.     She received an iron infusion on 2/5 and reports she has felt \"much better\".     She was seen by MFM on 2/2 d/t marginal cord insertion. Cord insertion no longer marginal and growth WNL. Per MFM, \"Recommend repeat assessment of TSH, free T4 AND total T3. If T3 is elevated, recommend referral to endocrinology.\"    TSH, FT4, and T3 to be drawn today.     O: Vitals: /82 (BP Location: Left arm, Cuff Size: Adult Regular)   Wt 77.6 kg (171 lb)   LMP 06/09/2020   BMI 27.60 kg/m    BMI= Body mass index is 27.6 kg/m .  Exam:  Constitutional: healthy, alert and no distress  Respiratory: respirations even and unlabored  Gastrointestinal: Abdomen soft, non-tender. Fundus measures appropriate for gestational age. Fetal heart tones hear without difficulty and within normal limits  : Deferred  Psychiatric: mentation appears normal and affect normal/bright    A/P:  1. (O99.013) Anemia during pregnancy in third trimester  (primary encounter diagnosis)  Plan: CBC with platelets    2. (E03.9) Subclinical hypothyroidism  Plan: ENDOCRINOLOGY ADULT REFERRAL     Plan to draw TSH, FT4 and T3 today per MFM recommendation. CBC was added as pt is anemic.   Lab collected CBC but did not collect her thyroid labs. Pt notified " and she will call to make a lab-only appointment for tomorrow.   CNM called scheduling to see if Stuart could be seen by endo at any location with any provider prior to her due date. No openings were available before her due date. Her labs have been stable this pregnancy and she denies symptoms of overt hypothyroidism. Per MFM, endo referral would be warranted with abnormal T3.   Will follow up with pt depending on lab results.     Discussed GBS screen - was done and negative on 2/4, will repeat later in pregnancy if still pregnant  Pt  to call with any questions or concerns.  Return to clinic 2 weeks    SWAPNIL Pires, AMELIE

## 2021-02-17 ENCOUNTER — PRENATAL OFFICE VISIT (OUTPATIENT)
Dept: OBGYN | Facility: CLINIC | Age: 31
End: 2021-02-17
Payer: COMMERCIAL

## 2021-02-17 VITALS — WEIGHT: 169 LBS | SYSTOLIC BLOOD PRESSURE: 120 MMHG | BODY MASS INDEX: 27.28 KG/M2 | DIASTOLIC BLOOD PRESSURE: 80 MMHG

## 2021-02-17 DIAGNOSIS — O99.013 ANEMIA DURING PREGNANCY IN THIRD TRIMESTER: Primary | ICD-10-CM

## 2021-02-17 DIAGNOSIS — E05.90 SUBCLINICAL HYPERTHYROIDISM: ICD-10-CM

## 2021-02-17 LAB — T3FREE SERPL-MCNC: 2.8 PG/ML (ref 2.3–4.2)

## 2021-02-17 PROCEDURE — 36415 COLL VENOUS BLD VENIPUNCTURE: CPT | Performed by: ADVANCED PRACTICE MIDWIFE

## 2021-02-17 PROCEDURE — 99207 PR PRENATAL VISIT: CPT | Performed by: ADVANCED PRACTICE MIDWIFE

## 2021-02-17 PROCEDURE — 84481 FREE ASSAY (FT-3): CPT | Performed by: ADVANCED PRACTICE MIDWIFE

## 2021-02-17 PROCEDURE — 84439 ASSAY OF FREE THYROXINE: CPT | Performed by: ADVANCED PRACTICE MIDWIFE

## 2021-02-17 PROCEDURE — 84443 ASSAY THYROID STIM HORMONE: CPT | Performed by: ADVANCED PRACTICE MIDWIFE

## 2021-02-17 NOTE — PATIENT INSTRUCTIONS
"GROUP B STREP    Group B Strep (GBS) is a common bacteria that is sometimes found in the vagina, urinary tract or rectum.  It is not harmful typically to adults but can cause serious illness in newborns.  It occasionally is passed from mother to baby during birth.   It is important to test in pregnancy.  When a woman is found to be positive for GBS, either at the first prenatal visit or by taking a culture at 36 weeks, treatment will be offered to reduce the chance of spreading the bacteria to the baby.       Treatment consists of either oral antibiotics early in pregnancy or antibiotics given by IV during labor if testing is positive at 36 weeks.      Even without treatment the baby rarely (1-2% of the time) gets infected.  With treatment the baby almost never gets infected.       There really isn't anything you can do to keep from getting or being positive for GBS.  It isn't sexually transmitted and there are no symptoms if you are positive.       Your midwife will discuss your results with you and make recommendations for treatment.    Labor Instructions for Midwife Patients    When to call:  Both during and after office hours text [phone]. There is a Nurse Midwife available to take your calls and answer your questions 24 hours a day.     When to call:  Call anytime you have important concerns about you or your baby.     Call if:    You are having contractions at regular intervals about 5-6 minutes apart lasting 30-60 seconds and becoming increasingly more intense     You have an uncontrollable gush of fluid from your vagina or feel a pop and gush like your water has broken    You have HEAVY bleeding, like heavy period, blood running down your legs, or  soaking a pad.     Some bleeding after a pelvic exam, after intercourse, or in labor when your cervix is dilating is normal and is referred to as \"bloody show\"    You have severe, continuous back or abdominal pain    You feel it is time to go to the " hospital    If this is your first labor, call when contractions are very intense and have been about every 3-4 minutes for about an hour    If it is your second labor or more, call when contractions are strong and about every 3-5 minutes or sooner depending on your level of discomfort.     Keep in mind we are always here for you! If you have questions, concerns please don't hesitate to call us.     What to eat/drink in labor: Drink plenty of fluid (water most importantly, juice, soda or tea without caffeine). Eat rice, pasta, soup, cereal, bread/toast, and fruit. Avoid dairy and greasy food as they are difficult to digest and you may experience some nausea during labor.    Comfort measures:    Baths and showers (ok even with ruptured membranes, it may temporarily slow contractions if you are still in the early stage of labor)    Warm/hot packs for back pain or discomfort    Back, belly, or thigh massages    Standing, rocking, walking, leaning over bed or tables, side-lying and sleeping    Miscellaneous:     Contractions are timed from the beginning of one to the beginning of the next    Try hard to sleep during the early stage of labor when you are not that uncomfortable. Timing of contractions at this point is not important    Even if you cannot sleep, resting in bed or on the couch can help you maintain your energy for labor    When you arrive at the hospital the nurse will check your baby's heartbeat, check your cervix, and will call us. The midwife on call will come in and be with you when you are in active labor    After hours you need to enter the hospital through the emergency room      PREECLAMPSIA SIGNS AND SYMPTOMS    Preeclampsia is a dangerous condition that some women develop in the second half of pregnancy. It can also begin after the baby is born.  Preeclampsia causes high blood pressure and can cause problems with many organ systems in your body.  It can also affect the growth of your baby. The exact  "cause of preeclampsia is unknown, however, there are signs and symptoms to watch for:    -A bad headache that doesn't improve with Tylenol  -Visual changes such as spots, flashes of light, blurry vision  -Pain in the upper right part of your abdomen, especially under the ribs that doesn't go away  -Nausea and/or vomiting  -Feeling extremely tired  -Yellowing of the skin and/or eyes  -Feeling \"not quite right\" or that something is wrong  -An extreme amount of swelling (some swelling in pregnancy is very normal)    If your midwife feels that you are developing preeclampsia, you will have lab tests drawn and will be monitored very closely.     If you are experiencing anyof these symptoms,   Call [facility]   [phone]    "

## 2021-02-17 NOTE — PROGRESS NOTES
S: Feels well,  Baby active.  Denies uterine cramping, vaginal bleeding or leaking of fluid. No headache, increase in edema, no epigastric pain.       O: Vitals: /80 (BP Location: Right arm, Cuff Size: Adult Regular)   Wt 76.7 kg (169 lb)   LMP 06/09/2020   BMI 27.28 kg/m    BMI= Body mass index is 27.28 kg/m .  Exam:  Constitutional: healthy, alert and no distress  Respiratory: respirations even and unlabored  Gastrointestinal: Abdomen soft, non-tender. Fundus measures appropriate for gestational age. Fetal heart tones hear without difficulty and within normal limits  : Deferred  Cephalic per BSUS    Psychiatric: mentation appears normal and affect normal/bright  A/P:  1. (O99.013) Anemia during pregnancy in third trimester  (primary encounter diagnosis)  - has received iron infusions x5  - 28wk cbc 10.0, 36wk hgb 10.3, trending up  - Continue PO iron     2. (E05.90) Subclinical hyperthyroidism  Plan: TSH with free T4 reflex, T3, Free  - TSH 0.27  TF 0.99 11/23/20, TSH 0.10 T4 0.94 at 28wks  - Redraw today  - Pt unable to get scheduled with endocrinology until after her due date  - Because levels have been relatively stable and she continues to be asymptomatic, likely subclinical hyperthyroidism d/t physiologic changes in pregnancy.   - Recheck TSH/T4 postpartum, refer to endo if still abnormal.       Platelets 123 at 35wks. Recheck on admit to LD.     Labor signs and symptoms discussed, aware of numbers to call  Discussed warning signs of PIH/preeclampsia and patient will monitor.  GBS screen completed on 2/4, neg, will repeat as needed    Discussed plans for labor.       Encouraged patient to call with any questions or concerns.  Return to clinic 1 weeks    SWAPNIL Pires, AMELIE

## 2021-02-18 LAB
T4 FREE SERPL-MCNC: 0.94 NG/DL (ref 0.76–1.46)
TSH SERPL DL<=0.005 MIU/L-ACNC: 0.15 MU/L (ref 0.4–4)

## 2021-02-24 ENCOUNTER — PRENATAL OFFICE VISIT (OUTPATIENT)
Dept: OBGYN | Facility: CLINIC | Age: 31
End: 2021-02-24
Payer: COMMERCIAL

## 2021-02-24 VITALS — DIASTOLIC BLOOD PRESSURE: 82 MMHG | WEIGHT: 176 LBS | BODY MASS INDEX: 28.41 KG/M2 | SYSTOLIC BLOOD PRESSURE: 126 MMHG

## 2021-02-24 DIAGNOSIS — O99.013 ANEMIA DURING PREGNANCY IN THIRD TRIMESTER: Primary | ICD-10-CM

## 2021-02-24 DIAGNOSIS — E05.90 SUBCLINICAL HYPERTHYROIDISM: ICD-10-CM

## 2021-02-24 PROCEDURE — 99207 PR PRENATAL VISIT: CPT | Performed by: ADVANCED PRACTICE MIDWIFE

## 2021-02-24 NOTE — NURSING NOTE
"Chief Complaint   Patient presents with     Prenatal Care     37w 1d no concerns       Initial /82 (BP Location: Right arm, Cuff Size: Adult Regular)   Wt 79.8 kg (176 lb)   LMP 2020   BMI 28.41 kg/m   Estimated body mass index is 28.41 kg/m  as calculated from the following:    Height as of 21: 1.676 m (5' 6\").    Weight as of this encounter: 79.8 kg (176 lb).  BP completed using cuff size: regular    Questioned patient about current smoking habits.  Pt. has never smoked.          The following HM Due: NONE  Shaneka Luna CMA    "

## 2021-02-24 NOTE — PROGRESS NOTES
S: Feels well,  Baby active.  Denies uterine cramping, vaginal bleeding or leaking of fluid. No headache, increase in edema, no epigastric pain.   O: Vitals: /82 (BP Location: Right arm, Cuff Size: Adult Regular)   Wt 79.8 kg (176 lb)   LMP 06/09/2020   BMI 28.41 kg/m    BMI= Body mass index is 28.41 kg/m .  Exam:  Constitutional: healthy, alert and no distress  Respiratory: respirations even and unlabored  Gastrointestinal: Abdomen soft, non-tender. Fundus measures appropriate for gestational age. Fetal heart tones hear without difficulty and within normal limits  : Deferred  Psychiatric: mentation appears normal and affect normal/bright  A:   (O99.013) Anemia during pregnancy in third trimester  (primary encounter diagnosis)  -Hgb trending upwards, most recently 10.3 at 36 weeks   -Continue with oral every other day dosing of iron supplement    (E05.90) Subclinical hyperthyroidism  -Stable at 36 weeks, TSH 0.15 with normal T3 and T4  -Plan for endocrinology ref postpartum if needed     Labor signs and symptoms discussed, aware of numbers to call  Discussed warning signs of PIH/preeclampsia and patient will monitor.  GBS screen completed. Discussed plans for labor.   Discussed recommendation to quarantine after 38 weeks if possible. She is likely going to continue working until due date but will consider.   Encouraged patient to call with any questions or concerns.  Return to clinic 1 weeks    ANDREE Unger CNM 2/24/2021 5:11 PM

## 2021-02-24 NOTE — PATIENT INSTRUCTIONS
"Labor Instructions for Midwife Patients    When to call:  Both during and after office hours call [phone]. There is a Nurse Midwife available to take your calls and answer your questions 24 hours a day.     When to call:  Call anytime you have important concerns about you or your baby.     Call if:    You are having contractions at regular intervals about 5-6 minutes apart lasting 30-60 seconds and becoming increasingly more intense     You have an uncontrollable gush of fluid from your vagina or feel a pop and gush like your water has broken    You have HEAVY bleeding, like heavy period, blood running down your legs, or  soaking a pad.     Some bleeding after a pelvic exam, after intercourse, or in labor when your cervix is dilating is normal and is referred to as \"bloody show\"    You have severe, continuous back or abdominal pain    You feel it is time to go to the hospital    If this is your first labor, call when contractions are very intense and have been about every 3-4 minutes for about an hour    If it is your second labor or more, call when contractions are strong and about every 3-5 minutes or sooner depending on your level of discomfort.     Keep in mind we are always here for you! If you have questions, concerns please don't hesitate to call us.     What to eat/drink in labor: Drink plenty of fluid (water most importantly, juice, soda or tea without caffeine). Eat rice, pasta, soup, cereal, bread/toast, and fruit. Avoid dairy and greasy food as they are difficult to digest and you may experience some nausea during labor.    Comfort measures:    Baths and showers (ok even with ruptured membranes, it may temporarily slow contractions if you are still in the early stage of labor)    Warm/hot packs for back pain or discomfort    Back, belly, or thigh massages    Standing, rocking, walking, leaning over bed or tables, side-lying and sleeping    Miscellaneous:     Contractions are timed from the beginning " of one to the beginning of the next    Try hard to sleep during the early stage of labor when you are not that uncomfortable. Timing of contractions at this point is not important    Even if you cannot sleep, resting in bed or on the couch can help you maintain your energy for labor    When you arrive at the hospital the nurse will check your baby's heartbeat, check your cervix, and will call us. The midwife on call will come in and be with you when you are in active labor    After hours you need to enter the hospital through the emergency room

## 2021-03-03 ENCOUNTER — PRENATAL OFFICE VISIT (OUTPATIENT)
Dept: OBGYN | Facility: CLINIC | Age: 31
End: 2021-03-03
Payer: COMMERCIAL

## 2021-03-03 VITALS — DIASTOLIC BLOOD PRESSURE: 78 MMHG | SYSTOLIC BLOOD PRESSURE: 120 MMHG | BODY MASS INDEX: 28.73 KG/M2 | WEIGHT: 178 LBS

## 2021-03-03 DIAGNOSIS — Z34.83 ENCOUNTER FOR SUPERVISION OF OTHER NORMAL PREGNANCY IN THIRD TRIMESTER: Primary | ICD-10-CM

## 2021-03-03 PROCEDURE — 99207 PR PRENATAL VISIT: CPT | Performed by: ADVANCED PRACTICE MIDWIFE

## 2021-03-03 NOTE — PROGRESS NOTES
S: Feels well and has no concerns.  Baby active.  Denies uterine cramping, vaginal bleeding or leaking of fluid. No headache, increase in edema, no epigastric pain.   O: Vitals: /78   Wt 80.7 kg (178 lb)   LMP 06/09/2020   BMI 28.73 kg/m    BMI= Body mass index is 28.73 kg/m .  Exam:  Constitutional: healthy, alert and no distress  Respiratory: respirations even and unlabored  Gastrointestinal: Abdomen soft, non-tender. Fundus measures appropriate for gestational age. Fetal heart tones hear without difficulty and within normal limits  : Deferred  Psychiatric: mentation appears normal and affect normal/bright  A:     ICD-10-CM    1. Encounter for supervision of other normal pregnancy in third trimester  Z34.83      P: Labor signs and symptoms discussed, aware of numbers to call  Discussed warning signs of PIH/preeclampsia and patient will monitor.  GBS screen completed. Discussed plans for labor.   Encouraged patient to call with any questions or concerns.  Return to clinic 1 weeks    SWAPNIL Bowser, AMELIE

## 2021-03-03 NOTE — PATIENT INSTRUCTIONS
Weeks 37 thru 40 - Gestational Age (Fetal Age - Weeks 35 thru 38):  At 38 weeks the fetus is considered full term and is ready to make its appearance at any time. As your baby becomes bigger, you may notice a change in fetal movement. If you notice a decrease in fetal movement, make sure to talk with your doctor. The fingernails have grown long and will need to be cut soon after birth. Small breast buds are present on both sexes. The mother is supplying the fetus with antibodies that will help protect against disease. All organs are developed, with the lungs maturing all the way until the day of delivery. The fetus is about 19 - 21 inches in length and weighs anywhere from 6   lbs to 10 lbs.

## 2021-03-09 ENCOUNTER — TELEPHONE (OUTPATIENT)
Dept: OBGYN | Facility: CLINIC | Age: 31
End: 2021-03-09

## 2021-03-09 NOTE — TELEPHONE ENCOUNTER
Forms filled in and placed in Divine Mcgee's bin in the midwife office in  for review, completion and signature.

## 2021-03-09 NOTE — TELEPHONE ENCOUNTER
Form received from: [employer]    Form requesting following info/need: STD    KELSEY needed?: No    Location of form: Dara's desk    When completed the route for return: Fax [phone]

## 2021-03-10 ENCOUNTER — PRENATAL OFFICE VISIT (OUTPATIENT)
Dept: OBGYN | Facility: CLINIC | Age: 31
End: 2021-03-10
Payer: COMMERCIAL

## 2021-03-10 VITALS — BODY MASS INDEX: 29.05 KG/M2 | WEIGHT: 180 LBS | DIASTOLIC BLOOD PRESSURE: 80 MMHG | SYSTOLIC BLOOD PRESSURE: 118 MMHG

## 2021-03-10 DIAGNOSIS — E05.90 SUBCLINICAL HYPERTHYROIDISM: ICD-10-CM

## 2021-03-10 DIAGNOSIS — O09.93 HIGH-RISK PREGNANCY, THIRD TRIMESTER: Primary | ICD-10-CM

## 2021-03-10 DIAGNOSIS — O99.013 ANEMIA DURING PREGNANCY IN THIRD TRIMESTER: ICD-10-CM

## 2021-03-10 PROCEDURE — 99207 PR PRENATAL VISIT: CPT | Performed by: ADVANCED PRACTICE MIDWIFE

## 2021-03-10 NOTE — NURSING NOTE
"Chief Complaint   Patient presents with     Prenatal Care     39 1/7 weeks       Initial /80   Wt 81.6 kg (180 lb)   LMP 2020   BMI 29.05 kg/m   Estimated body mass index is 29.05 kg/m  as calculated from the following:    Height as of 21: 1.676 m (5' 6\").    Weight as of this encounter: 81.6 kg (180 lb).  BP completed using cuff size: regular    Questioned patient about current smoking habits.  Pt. has never smoked.          The following HM Due: NONE      +fetal movement  +swelling right ankle    Melly Steven, CMA    "

## 2021-03-10 NOTE — PROGRESS NOTES
S: Feels well,  Baby active.  Denies uterine cramping, vaginal bleeding or leaking of fluid. No headache,no epigastric pain. Reports swelling in her right ankle.     O: Vitals: /80   Wt 81.6 kg (180 lb)   LMP 06/09/2020   BMI 29.05 kg/m    BMI= Body mass index is 29.05 kg/m .  Exam:  Constitutional: healthy, alert and no distress  Respiratory: respirations even and unlabored  Gastrointestinal: Abdomen soft, non-tender. Fundus measures appropriate for gestational age. Fetal heart tones hear without difficulty and within normal limits  : Deferred  Psychiatric: mentation appears normal and affect normal/bright  A/P:  1. (O09.93) High-risk pregnancy, third trimester  (primary encounter diagnosis)    2. (O99.013) Anemia during pregnancy in third trimester  -Hgb trending upwards, most recently 10.3 at 36 weeks   -Continue with oral every other day dosing of iron supplement    3. (E05.90) Subclinical hyperthyroidism  -Stable at 36 weeks, TSH 0.15 with normal T3 and T4  -Plan for endocrinology ref postpartum if needed        Labor signs and symptoms discussed, aware of numbers to call.  S/s preeclampsia reviewed   Discussed warning signs of PIH/preeclampsia and patient will monitor.  GBS screen completed. Discussed plans for labor.       Discussed postdates options. She states she would likely desire IOL at 41wks.   Encouraged patient to call with any questions or concerns.  Return to clinic 1 weeks    SWAPNIL Pires, AMELIE

## 2021-03-11 ENCOUNTER — HOSPITAL ENCOUNTER (INPATIENT)
Facility: CLINIC | Age: 31
LOS: 2 days | Discharge: HOME | End: 2021-03-14
Attending: ADVANCED PRACTICE MIDWIFE | Admitting: ADVANCED PRACTICE MIDWIFE
Payer: COMMERCIAL

## 2021-03-11 ENCOUNTER — HOSPITAL ENCOUNTER (OUTPATIENT)
Facility: CLINIC | Age: 31
Discharge: HOME | End: 2021-03-11
Attending: ADVANCED PRACTICE MIDWIFE | Admitting: ADVANCED PRACTICE MIDWIFE
Payer: COMMERCIAL

## 2021-03-11 VITALS
HEIGHT: 66 IN | SYSTOLIC BLOOD PRESSURE: 135 MMHG | DIASTOLIC BLOOD PRESSURE: 96 MMHG | WEIGHT: 180 LBS | BODY MASS INDEX: 28.93 KG/M2 | TEMPERATURE: 98.2 F

## 2021-03-11 DIAGNOSIS — Z36.89 ENCOUNTER FOR TRIAGE IN PREGNANT PATIENT: Primary | ICD-10-CM

## 2021-03-11 LAB
FERN CRYSTALLIZATION: NORMAL
RUPTURE OF FETAL MEMBRANES BY ROM PLUS: NEGATIVE
SPECIMEN SOURCE: NORMAL
WET PREP SPEC: NORMAL

## 2021-03-11 PROCEDURE — 87653 STREP B DNA AMP PROBE: CPT | Performed by: ADVANCED PRACTICE MIDWIFE

## 2021-03-11 PROCEDURE — 84112 EVAL AMNIOTIC FLUID PROTEIN: CPT | Performed by: ADVANCED PRACTICE MIDWIFE

## 2021-03-11 PROCEDURE — G0463 HOSPITAL OUTPT CLINIC VISIT: HCPCS | Mod: 25

## 2021-03-11 PROCEDURE — G0463 HOSPITAL OUTPT CLINIC VISIT: HCPCS

## 2021-03-11 PROCEDURE — 59025 FETAL NON-STRESS TEST: CPT | Mod: 26 | Performed by: ADVANCED PRACTICE MIDWIFE

## 2021-03-11 PROCEDURE — 99213 OFFICE O/P EST LOW 20 MIN: CPT | Mod: 25 | Performed by: ADVANCED PRACTICE MIDWIFE

## 2021-03-11 PROCEDURE — 87210 SMEAR WET MOUNT SALINE/INK: CPT | Performed by: ADVANCED PRACTICE MIDWIFE

## 2021-03-11 ASSESSMENT — MIFFLIN-ST. JEOR: SCORE: 1553.22

## 2021-03-11 NOTE — PROGRESS NOTES
Data: Patient assessed in the [facility]place for uterine contractions, leaking vaginal fluid.  Cervical exam not examined.  Membranes intact, ROM plus and FERN both negative.  Contractions irregular, patient reports cramping and denies any pain.  Action:  Presumed adequate fetal oxygenation documented (see flow record). Discharge instructions reviewed.  Patient instructed to report change in fetal movement, vaginal leaking of fluid or bleeding, abdominal pain, or any concerns related to the pregnancy to her nurse/physician.    Response: Orders to discharge home per Bethany Rea.  Patient verbalized understanding of education and verbalized agreement with plan. Discharged to home at 1142 via ambulation.

## 2021-03-11 NOTE — DISCHARGE INSTRUCTIONS
Discharge Instruction for Undelivered Patients      You were seen for: Membrane Assessment  We Consulted: Midwife Bethany  You had (Test or Medicine):fetal monitoring, contraction monitoring, membrane test and wet prep and collected GBS culture.     Diet:   Drink 8 to 12 glasses of liquids (milk, juice, water) every day.  You may eat meals and snacks.     Activity:  Count fetal kicks everyday (see handout)  Call your doctor or nurse midwife if your baby is moving less than usual.     Call your provider if you notice:  Swelling in your face or increased swelling in your hands or legs.  Headaches that are not relieved by Tylenol (acetaminophen).  Changes in your vision (blurring: seeing spots or stars.)  Nausea (sick to your stomach) and vomiting (throwing up).   Weight gain of 5 pounds or more per week.  Heartburn that doesn't go away.  Signs of bladder infection: pain when you urinate (use the toilet), need to go more often and more urgently.  The bag of ventura (rupture of membranes) breaks, or you notice leaking in your underwear.  Bright red blood in your underwear.  Abdominal (lower belly) or stomach pain.  For first baby: Contractions (tightening) less than 5 minutes apart for one hour or more and getting stronger.  Increase or change in vaginal discharge (note the color and amount)  Notify your midwife with any further questions or concerns.    Follow-up:  As scheduled in the clinic

## 2021-03-11 NOTE — PROGRESS NOTES
"[facility] CNM TRIAGE NOTE    Daniel Steiner is a 30 year old  with and IUP at 39w2d who presents with complaint of leaking clear fluid from vagina since 730. Patient noted a wet spot in bedding when she woke up. Since then has noted some watery discharge.    Patient states baby is active.  Denies vaginal bleeding  Present OB History at [facility] with the CNMs.     Problems this pregnancy:   1. Subclinical hypothyroidism     ROS:  Patient is alert and oriented      PHYSICAL EXAM:  Temp 98.2  F (36.8  C) (Oral)   Ht 1.676 m (5' 6\")   Wt 81.6 kg (180 lb)   LMP 2020   BMI 29.05 kg/m      FHT's 124 with moderate variability  Accelerations: present   Decelerations:  absent        Contractions: Pt is not john     Abdomen: gravid, nontender  Bloody show: no  Cervix: Deferred until results of ROM+ and ferning  Membranes pending    Results for orders placed or performed during the hospital encounter of 21   Rupture of Fetal Membranes by ROM Plus     Status: None   Result Value Ref Range    Rupture of Fetal Membranes by ROM Plus Negative NEG^Negative   Fern and nitrazine test     Status: None   Result Value Ref Range    Fern Crystallization No Ferning Present    Wet prep     Status: None    Specimen: Vagina   Result Value Ref Range    Specimen Description Vagina     Wet Prep No Trichomonas seen     Wet Prep No yeast seen     Wet Prep Few  PMNs seen       Wet Prep No clue cells seen          ASSESSMENT :   30 year old  with barroso IUP 39w2d not in labor  NST  reactive  GBS negative at 34 weeks, repeat pending, and membranes intact         PLAN:  Discharge home  Continue routine prenatal care      Teaching done r/t to s/s of labor, SROM, decreased fetal movement, comfort measures in third trimester.  Instructed to please refer to the discharge handouts, the RN triage line or on-call CNM for any questions or concerns.  Pt verbalizes understanding and agreement with " current plan of care.    SUZAN QUIÑONEZ CNM

## 2021-03-11 NOTE — PROVIDER NOTIFICATION
03/11/21 1127   Provider Notification   Provider Name/Title AMELIE Sims   Method of Notification At Bedside   Request Evaluate in Person   Notification Reason Lab/Diagnostic Study   AMELIE Sims at the bedside to update patient on lab results. Membranes intact. AMELIE Sims collected repeat GBS culture. VORB by AMELIE Sims to discharge home and follow up with scheduled appointment. Discharge instructions will be reviewed with patient.

## 2021-03-11 NOTE — PROVIDER NOTIFICATION
21 1043   Provider Notification   Provider Name/Title Bethany Rea AMELIE   Method of Notification At Bedside   Request Evaluate in Person   Notification Reason Uterine Activity;Pain;Lab/Diagnostic Study   AMELIE Sims at bedside to evaluate patient.  at 39.2 wks here for ROM evaluation. Patient reports vaginal leaking of clear fluid since 0730 along with irregular uterine cramping and a little less fetal movement since this morning. Patient declining any pain. First initial BP noted diastolic slightly elevated, will repeat in 15 min and update. Reviewed prenatal history. No obvious vaginal leaking present, however lots of vaginal discharge noted, collected ROM plus, with results pending. AMELIE reviewed FHT and contraction pattern. VORB by AMELIE Sims to collect wet prep and FERN. Both tests collected by AMELIE Sims. Plan to update AMELIE Sims with results.     1100: updated AMELIE Sims with repeat BP, diastolic was 96. Patient declines any pre-eclampsia symptoms.

## 2021-03-11 NOTE — PLAN OF CARE
Data: Patient presented to Birthplace: 3/11/2021 10:16 AM.  Reason for maternal/fetal assessment is leaking vaginal fluid. Patient reports leaking of clear fluid since patient awoke around 0730 and noticed leaking of clear fluid since that point.  Patient has also noticed irregular cramping and a little less fetal movement since this morning. Patient is a .  Prenatal record reviewed. Pregnancy has been uncomplicated..  Gestational Age 39w2d. VSS. Fetal movement present. Patient denies vaginal bleeding, abdominal pain, pelvic pressure, nausea, vomiting, headache, visual disturbances, epigastric or URQ pain, significant edema. Support person is not present.   Action: Verbal consent for EFM. Triage assessment completed. Bill of rights reviewed.  Response: Patient verbalized agreement with plan. Will contact Dr Bethany Rea with update and further orders.

## 2021-03-12 ENCOUNTER — ANESTHESIA EVENT (OUTPATIENT)
Dept: OBGYN | Facility: CLINIC | Age: 31
End: 2021-03-12
Payer: COMMERCIAL

## 2021-03-12 ENCOUNTER — ANESTHESIA (OUTPATIENT)
Dept: OBGYN | Facility: CLINIC | Age: 31
End: 2021-03-12
Payer: COMMERCIAL

## 2021-03-12 LAB
ABO + RH BLD: NORMAL
ABO + RH BLD: NORMAL
ALT SERPL W P-5'-P-CCNC: 13 U/L (ref 0–50)
ALT SERPL W P-5'-P-CCNC: 16 U/L (ref 0–50)
AST SERPL W P-5'-P-CCNC: 23 U/L (ref 0–45)
AST SERPL W P-5'-P-CCNC: 25 U/L (ref 0–45)
BASOPHILS # BLD AUTO: 0 10E9/L (ref 0–0.2)
BASOPHILS # BLD AUTO: 0 10E9/L (ref 0–0.2)
BASOPHILS NFR BLD AUTO: 0.1 %
BASOPHILS NFR BLD AUTO: 0.3 %
CREAT UR-MCNC: 37 MG/DL
DIFFERENTIAL METHOD BLD: ABNORMAL
DIFFERENTIAL METHOD BLD: ABNORMAL
EOSINOPHIL # BLD AUTO: 0 10E9/L (ref 0–0.7)
EOSINOPHIL # BLD AUTO: 0.1 10E9/L (ref 0–0.7)
EOSINOPHIL NFR BLD AUTO: 0 %
EOSINOPHIL NFR BLD AUTO: 0.5 %
ERYTHROCYTE [DISTWIDTH] IN BLOOD BY AUTOMATED COUNT: 13.2 % (ref 10–15)
ERYTHROCYTE [DISTWIDTH] IN BLOOD BY AUTOMATED COUNT: 13.3 % (ref 10–15)
GP B STREP DNA SPEC QL NAA+PROBE: NEGATIVE
HCT VFR BLD AUTO: 27.3 % (ref 35–47)
HCT VFR BLD AUTO: 34.8 % (ref 35–47)
HGB BLD-MCNC: 11.4 G/DL (ref 11.7–15.7)
HGB BLD-MCNC: 8.9 G/DL (ref 11.7–15.7)
IMM GRANULOCYTES # BLD: 0.1 10E9/L (ref 0–0.4)
IMM GRANULOCYTES # BLD: 0.1 10E9/L (ref 0–0.4)
IMM GRANULOCYTES NFR BLD: 0.7 %
IMM GRANULOCYTES NFR BLD: 1.2 %
LABORATORY COMMENT REPORT: NORMAL
LYMPHOCYTES # BLD AUTO: 1.2 10E9/L (ref 0.8–5.3)
LYMPHOCYTES # BLD AUTO: 2.3 10E9/L (ref 0.8–5.3)
LYMPHOCYTES NFR BLD AUTO: 20.7 %
LYMPHOCYTES NFR BLD AUTO: 8.8 %
MCH RBC QN AUTO: 31.5 PG (ref 26.5–33)
MCH RBC QN AUTO: 32 PG (ref 26.5–33)
MCHC RBC AUTO-ENTMCNC: 32.6 G/DL (ref 31.5–36.5)
MCHC RBC AUTO-ENTMCNC: 32.8 G/DL (ref 31.5–36.5)
MCV RBC AUTO: 96 FL (ref 78–100)
MCV RBC AUTO: 98 FL (ref 78–100)
MONOCYTES # BLD AUTO: 0.7 10E9/L (ref 0–1.3)
MONOCYTES # BLD AUTO: 0.8 10E9/L (ref 0–1.3)
MONOCYTES NFR BLD AUTO: 6 %
MONOCYTES NFR BLD AUTO: 6.3 %
NEUTROPHILS # BLD AUTO: 11.5 10E9/L (ref 1.6–8.3)
NEUTROPHILS # BLD AUTO: 8 10E9/L (ref 1.6–8.3)
NEUTROPHILS NFR BLD AUTO: 71 %
NEUTROPHILS NFR BLD AUTO: 84.4 %
NRBC # BLD AUTO: 0 10*3/UL
NRBC # BLD AUTO: 0 10*3/UL
NRBC BLD AUTO-RTO: 0 /100
NRBC BLD AUTO-RTO: 0 /100
PLATELET # BLD AUTO: 127 10E9/L (ref 150–450)
PLATELET # BLD AUTO: 148 10E9/L (ref 150–450)
PROT UR-MCNC: 0.15 G/L
PROT/CREAT 24H UR: 0.41 G/G CR (ref 0–0.2)
RBC # BLD AUTO: 2.78 10E12/L (ref 3.8–5.2)
RBC # BLD AUTO: 3.62 10E12/L (ref 3.8–5.2)
RUPTURE OF FETAL MEMBRANES BY ROM PLUS: POSITIVE
SARS-COV-2 RNA RESP QL NAA+PROBE: NEGATIVE
SPECIMEN EXP DATE BLD: NORMAL
SPECIMEN SOURCE: NORMAL
SPECIMEN SOURCE: NORMAL
URATE SERPL-MCNC: 5.3 MG/DL (ref 2.6–6)
WBC # BLD AUTO: 11.3 10E9/L (ref 4–11)
WBC # BLD AUTO: 13.6 10E9/L (ref 4–11)

## 2021-03-12 PROCEDURE — 87635 SARS-COV-2 COVID-19 AMP PRB: CPT | Performed by: ADVANCED PRACTICE MIDWIFE

## 2021-03-12 PROCEDURE — 722N000001 HC LABOR CARE VAGINAL DELIVERY SINGLE

## 2021-03-12 PROCEDURE — 59400 OBSTETRICAL CARE: CPT | Performed by: ADVANCED PRACTICE MIDWIFE

## 2021-03-12 PROCEDURE — 84450 TRANSFERASE (AST) (SGOT): CPT | Performed by: ADVANCED PRACTICE MIDWIFE

## 2021-03-12 PROCEDURE — 86900 BLOOD TYPING SEROLOGIC ABO: CPT | Performed by: ADVANCED PRACTICE MIDWIFE

## 2021-03-12 PROCEDURE — 258N000003 HC RX IP 258 OP 636: Performed by: ADVANCED PRACTICE MIDWIFE

## 2021-03-12 PROCEDURE — 36415 COLL VENOUS BLD VENIPUNCTURE: CPT | Performed by: ADVANCED PRACTICE MIDWIFE

## 2021-03-12 PROCEDURE — 250N000011 HC RX IP 250 OP 636: Performed by: ANESTHESIOLOGY

## 2021-03-12 PROCEDURE — 84156 ASSAY OF PROTEIN URINE: CPT | Performed by: ADVANCED PRACTICE MIDWIFE

## 2021-03-12 PROCEDURE — 3E0R3BZ INTRODUCTION OF ANESTHETIC AGENT INTO SPINAL CANAL, PERCUTANEOUS APPROACH: ICD-10-PCS | Performed by: ANESTHESIOLOGY

## 2021-03-12 PROCEDURE — 84550 ASSAY OF BLOOD/URIC ACID: CPT | Performed by: ADVANCED PRACTICE MIDWIFE

## 2021-03-12 PROCEDURE — 250N000013 HC RX MED GY IP 250 OP 250 PS 637: Performed by: ADVANCED PRACTICE MIDWIFE

## 2021-03-12 PROCEDURE — 00HU33Z INSERTION OF INFUSION DEVICE INTO SPINAL CANAL, PERCUTANEOUS APPROACH: ICD-10-PCS | Performed by: ANESTHESIOLOGY

## 2021-03-12 PROCEDURE — 84460 ALANINE AMINO (ALT) (SGPT): CPT | Performed by: ADVANCED PRACTICE MIDWIFE

## 2021-03-12 PROCEDURE — 250N000011 HC RX IP 250 OP 636: Performed by: ADVANCED PRACTICE MIDWIFE

## 2021-03-12 PROCEDURE — 120N000001 HC R&B MED SURG/OB

## 2021-03-12 PROCEDURE — 370N000003 HC ANESTHESIA WARD SERVICE

## 2021-03-12 PROCEDURE — 250N000009 HC RX 250: Performed by: ADVANCED PRACTICE MIDWIFE

## 2021-03-12 PROCEDURE — 999N000011 HC STATISTIC ANESTHESIA CASE

## 2021-03-12 PROCEDURE — 85025 COMPLETE CBC W/AUTO DIFF WBC: CPT | Performed by: ADVANCED PRACTICE MIDWIFE

## 2021-03-12 PROCEDURE — 250N000009 HC RX 250: Performed by: ANESTHESIOLOGY

## 2021-03-12 PROCEDURE — 86901 BLOOD TYPING SEROLOGIC RH(D): CPT | Performed by: ADVANCED PRACTICE MIDWIFE

## 2021-03-12 RX ORDER — LIDOCAINE HYDROCHLORIDE AND EPINEPHRINE 15; 5 MG/ML; UG/ML
INJECTION, SOLUTION EPIDURAL PRN
Status: DISCONTINUED | OUTPATIENT
Start: 2021-03-12 | End: 2021-03-12

## 2021-03-12 RX ORDER — NALOXONE HYDROCHLORIDE 0.4 MG/ML
0.2 INJECTION, SOLUTION INTRAMUSCULAR; INTRAVENOUS; SUBCUTANEOUS
Status: DISCONTINUED | OUTPATIENT
Start: 2021-03-12 | End: 2021-03-12

## 2021-03-12 RX ORDER — FERROUS SULFATE 325(65) MG
325 TABLET ORAL DAILY
Status: DISCONTINUED | OUTPATIENT
Start: 2021-03-12 | End: 2021-03-14 | Stop reason: HOSPADM

## 2021-03-12 RX ORDER — NALOXONE HYDROCHLORIDE 0.4 MG/ML
0.4 INJECTION, SOLUTION INTRAMUSCULAR; INTRAVENOUS; SUBCUTANEOUS
Status: DISCONTINUED | OUTPATIENT
Start: 2021-03-12 | End: 2021-03-12

## 2021-03-12 RX ORDER — METHYLERGONOVINE MALEATE 0.2 MG/ML
200 INJECTION INTRAVENOUS
Status: DISCONTINUED | OUTPATIENT
Start: 2021-03-12 | End: 2021-03-12

## 2021-03-12 RX ORDER — FENTANYL CITRATE 50 UG/ML
50-100 INJECTION, SOLUTION INTRAMUSCULAR; INTRAVENOUS
Status: DISCONTINUED | OUTPATIENT
Start: 2021-03-12 | End: 2021-03-12

## 2021-03-12 RX ORDER — IBUPROFEN 800 MG/1
800 TABLET, FILM COATED ORAL
Status: DISCONTINUED | OUTPATIENT
Start: 2021-03-12 | End: 2021-03-12

## 2021-03-12 RX ORDER — NALBUPHINE HYDROCHLORIDE 10 MG/ML
2.5-5 INJECTION, SOLUTION INTRAMUSCULAR; INTRAVENOUS; SUBCUTANEOUS EVERY 6 HOURS PRN
Status: DISCONTINUED | OUTPATIENT
Start: 2021-03-12 | End: 2021-03-12 | Stop reason: HOSPADM

## 2021-03-12 RX ORDER — TRANEXAMIC ACID 10 MG/ML
1 INJECTION, SOLUTION INTRAVENOUS EVERY 30 MIN PRN
Status: DISCONTINUED | OUTPATIENT
Start: 2021-03-12 | End: 2021-03-12

## 2021-03-12 RX ORDER — SODIUM CHLORIDE, SODIUM LACTATE, POTASSIUM CHLORIDE, CALCIUM CHLORIDE 600; 310; 30; 20 MG/100ML; MG/100ML; MG/100ML; MG/100ML
INJECTION, SOLUTION INTRAVENOUS CONTINUOUS
Status: DISCONTINUED | OUTPATIENT
Start: 2021-03-12 | End: 2021-03-12

## 2021-03-12 RX ORDER — AMOXICILLIN 250 MG
2 CAPSULE ORAL 2 TIMES DAILY
Status: DISCONTINUED | OUTPATIENT
Start: 2021-03-12 | End: 2021-03-14 | Stop reason: HOSPADM

## 2021-03-12 RX ORDER — BISACODYL 10 MG
10 SUPPOSITORY, RECTAL RECTAL DAILY PRN
Status: DISCONTINUED | OUTPATIENT
Start: 2021-03-14 | End: 2021-03-14 | Stop reason: HOSPADM

## 2021-03-12 RX ORDER — OXYTOCIN 10 [USP'U]/ML
10 INJECTION, SOLUTION INTRAMUSCULAR; INTRAVENOUS
Status: DISCONTINUED | OUTPATIENT
Start: 2021-03-12 | End: 2021-03-12

## 2021-03-12 RX ORDER — EPHEDRINE SULFATE 50 MG/ML
5 INJECTION, SOLUTION INTRAMUSCULAR; INTRAVENOUS; SUBCUTANEOUS
Status: DISCONTINUED | OUTPATIENT
Start: 2021-03-12 | End: 2021-03-12 | Stop reason: HOSPADM

## 2021-03-12 RX ORDER — AMOXICILLIN 250 MG
1 CAPSULE ORAL 2 TIMES DAILY
Status: DISCONTINUED | OUTPATIENT
Start: 2021-03-12 | End: 2021-03-14 | Stop reason: HOSPADM

## 2021-03-12 RX ORDER — OXYCODONE AND ACETAMINOPHEN 5; 325 MG/1; MG/1
1 TABLET ORAL
Status: DISCONTINUED | OUTPATIENT
Start: 2021-03-12 | End: 2021-03-12

## 2021-03-12 RX ORDER — BUPIVACAINE HYDROCHLORIDE 2.5 MG/ML
INJECTION, SOLUTION EPIDURAL; INFILTRATION; INTRACAUDAL PRN
Status: DISCONTINUED | OUTPATIENT
Start: 2021-03-12 | End: 2021-03-12

## 2021-03-12 RX ORDER — ACETAMINOPHEN 325 MG/1
650 TABLET ORAL EVERY 4 HOURS PRN
Status: DISCONTINUED | OUTPATIENT
Start: 2021-03-12 | End: 2021-03-12

## 2021-03-12 RX ORDER — HYDROCORTISONE 2.5 %
CREAM (GRAM) TOPICAL 3 TIMES DAILY PRN
Status: DISCONTINUED | OUTPATIENT
Start: 2021-03-12 | End: 2021-03-14 | Stop reason: HOSPADM

## 2021-03-12 RX ORDER — OXYTOCIN 10 [USP'U]/ML
10 INJECTION, SOLUTION INTRAMUSCULAR; INTRAVENOUS
Status: DISCONTINUED | OUTPATIENT
Start: 2021-03-12 | End: 2021-03-14 | Stop reason: HOSPADM

## 2021-03-12 RX ORDER — TRANEXAMIC ACID 10 MG/ML
1 INJECTION, SOLUTION INTRAVENOUS EVERY 30 MIN PRN
Status: DISCONTINUED | OUTPATIENT
Start: 2021-03-12 | End: 2021-03-14 | Stop reason: HOSPADM

## 2021-03-12 RX ORDER — ONDANSETRON 2 MG/ML
4 INJECTION INTRAMUSCULAR; INTRAVENOUS EVERY 6 HOURS PRN
Status: DISCONTINUED | OUTPATIENT
Start: 2021-03-12 | End: 2021-03-12

## 2021-03-12 RX ORDER — OXYTOCIN/0.9 % SODIUM CHLORIDE 30/500 ML
340 PLASTIC BAG, INJECTION (ML) INTRAVENOUS CONTINUOUS PRN
Status: DISCONTINUED | OUTPATIENT
Start: 2021-03-12 | End: 2021-03-14 | Stop reason: HOSPADM

## 2021-03-12 RX ORDER — MODIFIED LANOLIN
OINTMENT (GRAM) TOPICAL
Status: DISCONTINUED | OUTPATIENT
Start: 2021-03-12 | End: 2021-03-14 | Stop reason: HOSPADM

## 2021-03-12 RX ORDER — ACETAMINOPHEN 325 MG/1
650 TABLET ORAL EVERY 4 HOURS PRN
Status: DISCONTINUED | OUTPATIENT
Start: 2021-03-12 | End: 2021-03-14 | Stop reason: HOSPADM

## 2021-03-12 RX ORDER — OXYTOCIN/0.9 % SODIUM CHLORIDE 30/500 ML
100-340 PLASTIC BAG, INJECTION (ML) INTRAVENOUS CONTINUOUS PRN
Status: COMPLETED | OUTPATIENT
Start: 2021-03-12 | End: 2021-03-12

## 2021-03-12 RX ORDER — IBUPROFEN 800 MG/1
800 TABLET, FILM COATED ORAL EVERY 6 HOURS PRN
Status: DISCONTINUED | OUTPATIENT
Start: 2021-03-12 | End: 2021-03-14 | Stop reason: HOSPADM

## 2021-03-12 RX ORDER — CARBOPROST TROMETHAMINE 250 UG/ML
250 INJECTION, SOLUTION INTRAMUSCULAR
Status: DISCONTINUED | OUTPATIENT
Start: 2021-03-12 | End: 2021-03-12

## 2021-03-12 RX ORDER — OXYTOCIN/0.9 % SODIUM CHLORIDE 30/500 ML
100 PLASTIC BAG, INJECTION (ML) INTRAVENOUS CONTINUOUS
Status: DISCONTINUED | OUTPATIENT
Start: 2021-03-12 | End: 2021-03-14 | Stop reason: HOSPADM

## 2021-03-12 RX ADMIN — FERROUS SULFATE TAB 325 MG (65 MG ELEMENTAL FE) 325 MG: 325 (65 FE) TAB at 16:11

## 2021-03-12 RX ADMIN — LIDOCAINE HYDROCHLORIDE 20 ML: 10 INJECTION, SOLUTION EPIDURAL; INFILTRATION; INTRACAUDAL; PERINEURAL at 07:50

## 2021-03-12 RX ADMIN — LIDOCAINE HYDROCHLORIDE,EPINEPHRINE BITARTRATE 3 ML: 15; .005 INJECTION, SOLUTION EPIDURAL; INFILTRATION; INTRACAUDAL; PERINEURAL at 01:37

## 2021-03-12 RX ADMIN — DOCUSATE SODIUM 50 MG AND SENNOSIDES 8.6 MG 1 TABLET: 8.6; 5 TABLET, FILM COATED ORAL at 20:49

## 2021-03-12 RX ADMIN — BUPIVACAINE HYDROCHLORIDE 5 ML: 2.5 INJECTION, SOLUTION EPIDURAL; INFILTRATION; INTRACAUDAL at 01:41

## 2021-03-12 RX ADMIN — FENTANYL CITRATE 100 MCG: 50 INJECTION, SOLUTION INTRAMUSCULAR; INTRAVENOUS at 01:06

## 2021-03-12 RX ADMIN — IBUPROFEN 800 MG: 800 TABLET ORAL at 20:50

## 2021-03-12 RX ADMIN — SODIUM CHLORIDE, POTASSIUM CHLORIDE, SODIUM LACTATE AND CALCIUM CHLORIDE: 600; 310; 30; 20 INJECTION, SOLUTION INTRAVENOUS at 01:11

## 2021-03-12 RX ADMIN — IBUPROFEN 800 MG: 800 TABLET ORAL at 09:03

## 2021-03-12 RX ADMIN — IBUPROFEN 800 MG: 800 TABLET ORAL at 14:55

## 2021-03-12 RX ADMIN — Medication 340 ML/HR: at 07:48

## 2021-03-12 RX ADMIN — BUPIVACAINE HYDROCHLORIDE 5 ML: 2.5 INJECTION, SOLUTION EPIDURAL; INFILTRATION; INTRACAUDAL at 01:43

## 2021-03-12 RX ADMIN — Medication: at 01:49

## 2021-03-12 ASSESSMENT — MIFFLIN-ST. JEOR: SCORE: 1553.22

## 2021-03-12 NOTE — L&D DELIVERY NOTE
OB Vaginal Delivery Note    Daniel Steiner MRN# [id number]   Age: 30 year old YOB: 1990       GA: 39w3d  GP:   Labor Complications: None   EBL:   mL  Delivery QBL: 624 mL  Delivery Type: Vaginal, Spontaneous   ROM to Delivery Time: (Delivered) Hours: 8 Minutes: 43   Weight:     1 Minute 5 Minute 10 Minute   Apgar Totals: 9   9        OZIEL, JIM       Delivery Note    IUP at 39 weeks gestation delivered on 3/12/2021.     delivery of a viable  Female infant.  Apgars of 9 at 1 minute and 9 at 5 minutes.  Labor was spontaneous.    Medications administered  in labor:  Pain Rx narcotics  and Epidural; Antibiotics No; Other Pitocin given IV for active management of third stage of labor  Perineum: Minor laceration - No repair and 1st degree, delayed cord clamping Yes  Placenta-mechanism: spontaneous, intact,  with a 3 vessel cord. IV oxytocin was given.  Estimated Blood Loss:  624cc's  Complications of pregnancy, labor and delivery: None    SWAPNIL Bowser, CNM  Delivery Details:  Daniel Steiner, a 30 year old  female delivered a viable infant with apgars of 9  and 9 . Patient was fully dilated and pushing after   hours   minutes in active labor. Delivery was via vaginal, spontaneous  to a sterile field under epidural;intravenous regional  anesthesia. Infant delivered in vertex  left  occiput  anterior  position. Anterior and posterior shoulders delivered without difficulty. The cord was clamped, cut twice and 3 vessels  were noted. Cord blood was obtained in routine fashion with the following disposition: lab .      Cord complications: nuchal   Placenta delivered at 3/12/2021  7:56 AM . Placental disposition was Hospital disposal . Fundal massage performed and fundus found to be firm.     Episiotomy: none    Perineum, vagina, cervix were inspected, and the following lacerations were noted:   Perineal lacerations: 1st                    Excellent hemostasis was noted. Needle  count correct. Infant and patient in delivery room in good and stable condition.        Saundra Steiner [[id number]]    Labor Event Times    Labor onset date: 3/12/21 Onset time: 12:30 AM   Dilation complete date: 3/12/21 Complete time:  5:30 AM   Start pushing date/time: 3/12/2021 0607      Labor Events     labor?: No   steroids: None  Labor Type: Spontaneous  Predominate monitoring during 1st stage: continuous electronic fetal monitoring     Antibiotics received during labor?: No     Rupture date/time: 3/11/21 2300   Rupture type: Spontaneous rupture of membranes occuring during spontaneous labor or augmentation  Fluid color: Pink  Fluid odor: Normal     Delivery/Placenta Date and Time    Delivery Date: 3/12/21 Delivery Time:  7:43 AM   Placenta Date/Time: 3/12/2021  7:56 AM  Oxytocin given at the time of delivery: after delivery of baby     Vaginal Counts     Initial count performed by 2 team members:  Two Team Members   Bethany CURRAN RN       Needles Suture Pickens Sponges Instruments   Initial counts 2  5    Added to count  1     Final counts 2 1 5    Placed during labor Accounted for at the end of labor   Yes Yes   NA NA   NA NA    Final count performed by 2 team members:  Two Team Members   Bethany Knapp RN      Final count correct?: Yes     Apgars    Living status: Living   1 Minute 5 Minute 10 Minute 15 Minute 20 Minute   Skin color: 1  1       Heart rate: 2  2       Reflex irritability: 2  2       Muscle tone: 2  2       Respiratory effort: 2  2       Total: 9  9       Apgars assigned by: MARY CURRAN     Cord    Vessels: 3 Vessels Complications: Nuchal   Cord Blood Disposition: Lab Gases Sent?: No       Resuscitation    Methods: None     Skin to Skin and Feeding Plan    Skin to skin initiation date/time: 1/3/1841    Skin to skin with: Mother  Skin to skin end date/time:        Labor Events and Shoulder Dystocia    Fetal Tracing Prior to Delivery: Category  2  Shoulder dystocia present?: Neg     Delivery (Maternal) (Provider to Complete) ([id number])    Episiotomy: None  Perineal lacerations: 1st Repaired?: No      Blood Loss  Mother: Stuart Steiner #[id number]   Start of Mother's Information    IO Blood Loss  03/12/21 0030 - 03/12/21 0814    Delivery QBL (mL) Hospital Encounter 624 mL    Total  624 mL         End of Mother's Information  Mother: Stuart Steiner #[id number]          Delivery - Provider to Complete ([id number])    Delivering clinician: Suzan Rea CNM  CNEDIN Care: Exclusive CNM care in labor  Attempted Delivery Types (Choose all that apply): Spontaneous Vaginal Delivery  Delivery Type (Choose the 1 that will go to the Birth History): Vaginal, Spontaneous                   Other personnel:  Provider Role   Camilla Knapp RN                 Placenta    Delayed Cord Clamping: Done  Date/Time: 3/12/2021  7:56 AM  Removal: Spontaneous  Disposition: Hospital disposal           Anesthesia    Method: Epidural, INTRAVENOUS REGIONAL  Cervical dilation at placement: 4-7     Analgesic:  BIRTH HISTORY: ANALGESIC   FENTANYL 2 MCG/ML - BUPIVACAINE 0.125% (0.5% SOURCE)  ML NS EPIDURAL (YELLOW CADD CASSETTE) CNR   FENTANYL CITRATE IV             Presentation and Position    Presentation: Vertex    Position: Left Occiput Anterior                 SUZAN REA CNM

## 2021-03-12 NOTE — PROVIDER NOTIFICATION
03/12/21 0000   Provider Notification   Provider Name/Title Bethany KINSEY   Method of Notification Phone   MD notified of patient arrival and admitting complaint. Gestation, G+P, GBS negative status and prenatal history reviewed. Contractions began at 2300 and patient appears uncomfortable and is breathing through contractions which are tracing every 1-3 minutes. FHR tracing is category 1. TORB to check cervix at this time; SVE per RN 2/70/-2. CNM will make way to hospital.

## 2021-03-12 NOTE — H&P
AMELIE Labor Admission History & Physical    Daniel Steiner is a 30 year old  with an IUP at 39w3d  ; ,   Partner/support Person: Silviano  Language Barrier: English  Clinic: [place]   Provider: SWAPNIL Bowser CNM    Daniel Steiner is admitted to the [facility] at [facility] on 3/12/2021 at 12:49 AM       History of present inllness/Chief Complaint:    Here with: spontaneous onset of labor  Patient reports contractions are Regular           Baby active Yes  Membranes are ruptured since  and verified with sterile speculum exam by positive ROM+.  Bloody show No   Any changes with medical history since last prenatal visit No  Declines syphilis screening.  Not needed as done at 28 weeks, and negative    Obstetrical history  Estimated Date of Delivery: Mar 16, 2021 determined by LMP and confirmed with dating ultrasound at 9 weeks gestation  Patient's last menstrual period was 2020.   Dating U/S: 20    Fetal anatomic survey: Normal  Placenta: posterior    PRENATAL COURSE  Prenatal care began at 9 wks gestation for a total of 12 prenatal visits.  Total wt gain 44 pounds; Body mass index is 29.05 kg/m .  Prenatal Blood Pressure: WNL, initial blood pressure on unit 149/100  Prenatal course was essentially uncomplicated  Tdap: given  Rhogam: NA    Patient Active Problem List   Diagnosis     Subclinical hyperthyroidism     Low ferritin     Anemia during pregnancy in third trimester     Labor and delivery indication for care or intervention     Screening for cervical cancer     High-risk pregnancy, third trimester     Elevated blood pressure reading without diagnosis of hypertension     B12 deficiency     Migraine     Encounter for triage in pregnant patient     Indication for care in labor or delivery       HISTORY  No Known Allergies  Past Medical History:   Diagnosis Date     Depressive disorder     anxiety-well managed, no meds     Past Surgical History:   Procedure Laterality Date  "    D & C  2018    missed AB     SEPTOPLASTY  2004     TONSILLECTOMY & ADENOIDECTOMY  2004     Family History   Problem Relation Age of Onset     Colon Cancer Maternal Grandfather      Colon Cancer Maternal Aunt      Social History     Tobacco Use     Smoking status: Never Smoker     Smokeless tobacco: Never Used   Substance Use Topics     Alcohol use: Not Currently     OB History    Para Term  AB Living   3 0 0 0 2 0   SAB TAB Ectopic Multiple Live Births   0 0 0 0 0      # Outcome Date GA Lbr Octavio/2nd Weight Sex Delivery Anes PTL Lv   3 Current            2 AB 20 7w0d          1 AB 18 6w0d              LABS:  Lab Results   Component Value Date    ABO A 2020    RH Pos 2020    AS Neg 2020    HGB 10.3 (L) 2021    HEPBANG Nonreactive 2020    CHPCRT Negative 2021    GCPCRT Negative 2021       GBS Status:   Lab Results   Component Value Date    GBS Negative 2021     Rubella: Immune    HIV: Non-Reactive   Platelets:  174  1hr GCT:  87    ROS   Pt is alert and oriented  Pt denies significant constitutional symptoms including fever and/or malaise.    Pt denies significant respiratory, cardiovacular, GI, or muscular/skeletal complaints.    Neuro: Denies HA and visual changes  Muscoloskeletal: Denies except for discomforts r/t pregnancy     PHYSICAL EXAM:  Pulse 98   Temp 97.7  F (36.5  C) (Oral)   Ht 1.676 m (5' 6\")   Wt 81.6 kg (180 lb)   LMP 2020   BMI 29.05 kg/m    General appearance:  healthy, alert and active   Heart: RRR  Lungs: CTA bilaterally, normal respiratory effort  Abdomen: gravid, single vertex fetus, non-tender, EFW 7 1/2 lbs.   Legs: reflexes 2+ bilaterally, no clonus, no edema     Contractions: Pt is john every 1-3 minutes, lasting 45 seconds and palpates moderate    Fetal heart tones: Baseline 128   Variability: moderate   Accelerations: present  Decelerations: absent    NST: reactive    Cervix: 4cm/100%/-1, 2cm " per RN exam earlier  Bloody show: no  Membranes:  Ruptured    ASSESSMENT:  30 year old  with barroso IUP 39w3d in early labor  NST reactive  GBS negative and membranes ruptured for 2 hours    PLAN:  Routine CNM care  Labs ordered: Hemoglobin and type and screen  Teaching done r/t comfort measures, pain management options, and labor processes.  Admit - see IP orders  Anticipate     SUZAN QUIÑONEZ CNM

## 2021-03-12 NOTE — PROVIDER NOTIFICATION
03/12/21 0544   Provider Notification   Provider Name/Title Bethany Rea AMELIE   Method of Notification Phone   MD notified of SVE 10/100/0. Stuart is feeling breakthrough pain on right lower groin, but no rectal pressure at this time. FHR tracing has shown intermittent variables but good variability and accelerations present. TORB to labor down at this time; if Stuart feels urge to push notify CNM.

## 2021-03-12 NOTE — PROVIDER NOTIFICATION
03/12/21 0610   Provider Notification   Provider Name/Title Bethany KINSEY   Method of Notification At Bedside   Bethany KINSEY at bedside for pushing efforts. FHR picking up in mid 80s after some pushing efforts. FSE applied. Prolonged decel at 0630 with arnaud 65. Position change from left to right lateral, fluid bolus, and oxygen applied per verbal order from Bethany KINSEY. In house paged and arrived at bedside at 0631 for examination of pushing efforts.

## 2021-03-12 NOTE — PROGRESS NOTES
"CNM PROGRESS NOTE (late entry for 620)    SUBJECTIVE:  Patient comfortable with epidural. Complete and feeling pressure to push. Has had a few decels to the 80-90s for 45-60 seconds. FSE placed to better assess FHTs. Asked that Dr. Soni be paged, and asked In house MD, Dr. Parikh, to come to room to assess. With position change and decels would resolve    OBJECTIVE:  /73   Pulse 98   Temp 98.4  F (36.9  C) (Oral)   Resp 16   Ht 1.676 m (5' 6\")   Wt 81.6 kg (180 lb)   LMP 2020   BMI 29.05 kg/m      Fetal heart tones: Baseline 124   Variability: moderate  Accelerations: present  Decelerations: present, intermittent prolonged variable.    Contractions: Pt is john every 2-3 minutes, lasting 45-60 seconds and palpates moderate    Cervix: 10/ 100% /-1 Vtx, moves baby to 0 with pushing  ROM: clear fluid    Pitocin- none  Antibiotics- none  Cervical ripening: N/A    ASSESSMENT:  IUP @ 39w3d second stage labor   GBS- negative  Repetitive variable decelerations     PLAN:     Anticipate   MD consultant on call Dr. Parikh/Dr. Soni available prn    SUZAN QUIÑONEZ CNM    "

## 2021-03-12 NOTE — PROVIDER NOTIFICATION
03/12/21 1002   Provider Notification   Provider Name/Title Divine KINSEY   Method of Notification Electronic Page   Request Evaluate-Remote   Notification Reason Vital Signs Change;Status Update   Divine Mcgee CNM notified of BP's last couple diastolic in 90 range. No symptoms present. Pre-E labs WNLx PCR elevated. Patient okay to transfer to postpartum and to continue to monitor BP's at this time.

## 2021-03-12 NOTE — PLAN OF CARE
[facility]: [facility].  Reason for maternal/fetal assessment is uterine contractions, leaking vaginal fluid. Patient reports uterine contractions that began at 2300 and are uncomfortable and getting more intense. Stuart also reports leaking fluid since this morning with a more significant gush when contractions began. She was seen in triage this morning for ROM assessment which was negative.  Patient is a .  Prenatal record reviewed. Pregnancy  has been complicated by anemia.  Gestational Age 39w3d. VSS. Fetal movement present. Patient denies pelvic pressure, nausea, vomiting, headache, visual disturbances, epigastric or URQ pain, significant edema. Support person Silviano is present.   Action: Verbal consent for EFM. Triage assessment completed. Bill of rights reviewed.  Response: Patient verbalized agreement with plan. Will contact Dr Bethany Rea with update and further orders.

## 2021-03-12 NOTE — PROVIDER NOTIFICATION
03/12/21 1125   Provider Notification   Provider Name/Title Divine CNEDIN   Method of Notification Phone   Request Evaluate-Remote   Notification Reason Vital Signs Change   CNM updated on BP's and patient denies any S/S of preeclampsia. Repeat preeclampsia labs at 1400 per CNM and monitor BP's every hour.

## 2021-03-12 NOTE — PROVIDER NOTIFICATION
03/12/21 0130   Provider Notification   Provider Name/Title Dr. Diaz   Method of Notification At Bedside   MDA at bedside for placement of epidural.

## 2021-03-12 NOTE — PROVIDER NOTIFICATION
03/12/21 0638   Provider Notification   Provider Name/Title Dr. Ammy Soni   Method of Notification Phone   Request Evaluate - Remote   Notification Reason Status Update     OB updated Anna Rea asked writer to update. Patient is complete and pushing. Baby had long decel and has recovered now. Dr. Parikh is in the room and evaluating. Anna does not need OB to come in, but just wanted to update her.

## 2021-03-12 NOTE — PROGRESS NOTES
"S:  I was asked as the OB In House MD to evaluate this pt by Bethany Rea CNM for eval of second stage variable decelerations.  The pt is Daniel Steiner is a 30 year old female  Estimated Date of Delivery: Mar 16, 2021 with an epidural block in place now complete and pushing    O:  /75   Pulse 98   Temp 98.2  F (36.8  C) (Oral)   Ht 1.676 m (5' 6\")   Wt 81.6 kg (180 lb)   LMP 2020   BMI 29.05 kg/m    Genitourinary: Normal external genitalia without lesions and barroso infant vtx now  with pushing.  EFW approx 7.5 #  Clinical pelvimetry checked and is felt to be adequate for this EFW to my exam  Pt is on her R side with O2 in place    FHR tracing is now Cat 1    A:  IUP term now C/P  Cat 2 tracing now resolved    P:  Will closely monitor  Would allow pushing/labor to continue with appropriate Rx to follow as indicated.  May consider VE assist if FHR tracing changes    "

## 2021-03-12 NOTE — ANESTHESIA PREPROCEDURE EVALUATION
Anesthesia Pre-Procedure Evaluation    Patient: Daniel Steiner   MRN: [id number] : 1990        Preoperative Diagnosis: * No surgery found *   Procedure :      Past Medical History:   Diagnosis Date     Depressive disorder     anxiety-well managed, no meds      Past Surgical History:   Procedure Laterality Date     D & C  2018    missed AB     SEPTOPLASTY  2004     TONSILLECTOMY & ADENOIDECTOMY  2004      No Known Allergies   Social History     Tobacco Use     Smoking status: Never Smoker     Smokeless tobacco: Never Used   Substance Use Topics     Alcohol use: Not Currently      Wt Readings from Last 1 Encounters:   21 81.6 kg (180 lb)        Anesthesia Evaluation            ROS/MED HX  ENT/Pulmonary:  - neg pulmonary ROS     Neurologic:  - neg neurologic ROS     Cardiovascular:  - neg cardiovascular ROS     METS/Exercise Tolerance:     Hematologic:     (+) anemia,     Musculoskeletal:  - neg musculoskeletal ROS     GI/Hepatic:     (+) GERD,  (-) hepatitis   Renal/Genitourinary:  - neg Renal ROS     Endo:     (+) thyroid problem,  hyperthyroidism Subclinical,     Psychiatric/Substance Use:     (+) psychiatric history anxiety and depression     Infectious Disease:  - neg infectious disease ROS     Malignancy:  - neg malignancy ROS     Other:            Physical Exam    Airway        Mallampati: II   TM distance: > 3 FB   Neck ROM: full   Mouth opening: > 3 cm    Respiratory Devices and Support         Dental           Cardiovascular   cardiovascular exam normal          Pulmonary   pulmonary exam normal            Other findings: Lab Test        21                       1627          1055          1435          WBC          9.5          8.6          6.1           HGB          10.3*        10.0*        10.7*         MCV          97           95           90            PLT          123*         129*         170            Lab Test        20                        1435          NA           138           POTASSIUM    3.3*          CHLORIDE     108           CO2          23            BUN          9             CR           0.66          ANIONGAP     7             OLGA LIDIA          8.5           GLC          97              OUTSIDE LABS:  CBC:   Lab Results   Component Value Date    WBC 9.5 02/11/2021    WBC 8.6 01/14/2021    HGB 10.3 (L) 02/11/2021    HGB 10.0 (L) 01/14/2021    HCT 30.8 (L) 02/11/2021    HCT 30.1 (L) 01/14/2021     (L) 02/11/2021     (L) 01/14/2021     BMP:   Lab Results   Component Value Date     08/22/2020    POTASSIUM 3.3 (L) 08/22/2020    CHLORIDE 108 08/22/2020    CO2 23 08/22/2020    BUN 9 08/22/2020    CR 0.66 08/22/2020    GLC 97 08/22/2020     COAGS: No results found for: PTT, INR, FIBR  POC: No results found for: BGM, HCG, HCGS  HEPATIC: No results found for: ALBUMIN, PROTTOTAL, ALT, AST, GGT, ALKPHOS, BILITOTAL, BILIDIRECT, CHRISSY  OTHER:   Lab Results   Component Value Date    OLGA LIDIA 8.5 08/22/2020    TSH 0.15 (L) 02/17/2021    T4 0.94 02/17/2021       Anesthesia Plan    ASA Status:  2      Anesthesia Type: Epidural.              Consents    Anesthesia Plan(s) and associated risks, benefits, and realistic alternatives discussed. Questions answered and patient/representative(s) expressed understanding.     - Discussed with:  Patient         Postoperative Care       PONV prophylaxis: Ondansetron (or other 5HT-3)     Comments:                Channing Diaz MD

## 2021-03-12 NOTE — PROGRESS NOTES
"CNM PROGRESS NOTE    SUBJECTIVE:  Much more comfortable after epidural. Blood pressures improved from 150s/100s to 130-140s/80s.    OBJECTIVE:  BP (!) 142/83   Pulse 98   Temp 97.7  F (36.5  C) (Oral)   Ht 1.676 m (5' 6\")   Wt 81.6 kg (180 lb)   LMP 2020   BMI 29.05 kg/m      Fetal heart tones: Baseline 130   Variability:   Accelerations: present  Decelerations: absent    Contractions: Pt is john every 3 minutes, lasting 34-60 seconds and palpates moderate    Cervix: 5/ 100% / -1, Vtx  ROM: clear fluid    Pitocin- none  Antibiotics- none  Cervical ripening: N/A    ASSESSMENT:  IUP @ 39w3d early labor   GBS- negative  Elevated blood pressures     PLAN:   Preeclampsia labs normal thus far. Cath urine pending.  Anticipate   reevaluate in 2-4 hours/PRN    SUZAN QUIÑONEZ CNM    "

## 2021-03-12 NOTE — PLAN OF CARE
Patient VS are stable and WNL. Patient is voiding and ambulating independently. Pain well managed with ibuprofen.

## 2021-03-12 NOTE — ANESTHESIA PROCEDURE NOTES
Pre-Procedure   Staff -   Anesthesiologist:  Channing Diaz MD  Performed By: anesthesiologist  Referred By: EDIN Torres  Location: OB  Pre-Anesthestic Checklist: patient identified, IV checked, site marked, risks and benefits discussed, informed consent, monitors and equipment checked, pre-op evaluation, at physician/surgeon's request and post-op pain management  Timeout:  Correct Patient: Yes   Correct Procedure: Yes   Correct Site: Yes   Correct Position: Yes   Correct Laterality: Yes   Site Marked: Yes    Procedure DocumentationProcedure: epidural catheter  Comments:  Patient desires Labor Epidural for labor analgesia. Vaginal delivery anticipated.    Chart reviewed. Patient examined. No changes to pre procedure chart review. Risks including but not limited to bleeding, infection, nerve injury, PDPH, intrathecal injection, high block, incomplete block, one-sided block, back pain, and low blood pressure discussed in detail. Questions answered. Consent signed.    Pause for the Cause completed. NIBP and pulse ox functioning. L&D nurse present.    Procedure: Sitting. Betadine prep x 3. Sterile drape applied.  Lidocaine 1% x 2 cc local infiltration at L 3-4.  17 G. Tu needle ML MIGUEL 1 attempt.  No CSF, paresthesia or blood. 20 g. Epidural catheter inserted w/o resistance 5 cm.  Negative aspiration for CSF and blood. Filter in line.  Test dose Lidocaine 1.5% w/ 1:200,000 epi x 3 cc injected. Negative for neuro change or symptoms of intravascular injection.  Bolus dose: Marcaine 0.25% 5cc x 2 doses (10 cc total).  Infusion orders written.    I or my partner am immediately available. I or my partner will monitor the patient and supervise nursing care at necessary intervals.    [place]

## 2021-03-12 NOTE — PROVIDER NOTIFICATION
03/12/21 1440   Provider Notification   Provider Name/Title Divine KINSEY   Method of Notification Phone   Request Evaluate - Remote   Notification Reason Status Update   CNM updated on BP's, orders received to do blood pressures once a shift or prn. CNM also updated on lab results.

## 2021-03-12 NOTE — PLAN OF CARE
Data: Daniel Steiner transferred to [id number] via wheelchair at 1030. Baby transferred via parent's arms. Patient unable to void after delivery. Pulido Catheter out at 0600. Bladder to be scanned on postpartum. Bleeding stable.  Action: Receiving unit notified of transfer: Yes. Patient and family notified of room change. Report given to Hiral JACOB at 1035. Belongings sent to receiving unit. Accompanied by Registered Nurse. Oriented patient to surroundings. Call light within reach. ID bands double-checked with receiving RN.  Response: Patient tolerated transfer and is stable.

## 2021-03-13 LAB
ERYTHROCYTE [DISTWIDTH] IN BLOOD BY AUTOMATED COUNT: 13.5 % (ref 10–15)
HCT VFR BLD AUTO: 25.6 % (ref 35–47)
HGB BLD-MCNC: 8.1 G/DL (ref 11.7–15.7)
MCH RBC QN AUTO: 31.2 PG (ref 26.5–33)
MCHC RBC AUTO-ENTMCNC: 31.6 G/DL (ref 31.5–36.5)
MCV RBC AUTO: 99 FL (ref 78–100)
PLATELET # BLD AUTO: 129 10E9/L (ref 150–450)
RBC # BLD AUTO: 2.6 10E12/L (ref 3.8–5.2)
WBC # BLD AUTO: 11.5 10E9/L (ref 4–11)

## 2021-03-13 PROCEDURE — 85027 COMPLETE CBC AUTOMATED: CPT | Performed by: ADVANCED PRACTICE MIDWIFE

## 2021-03-13 PROCEDURE — 120N000001 HC R&B MED SURG/OB

## 2021-03-13 PROCEDURE — 36415 COLL VENOUS BLD VENIPUNCTURE: CPT | Performed by: ADVANCED PRACTICE MIDWIFE

## 2021-03-13 PROCEDURE — 250N000013 HC RX MED GY IP 250 OP 250 PS 637: Performed by: ADVANCED PRACTICE MIDWIFE

## 2021-03-13 RX ADMIN — IBUPROFEN 800 MG: 800 TABLET ORAL at 22:23

## 2021-03-13 RX ADMIN — HYDROCORTISONE: 25 CREAM TOPICAL at 22:00

## 2021-03-13 RX ADMIN — IBUPROFEN 800 MG: 800 TABLET ORAL at 02:55

## 2021-03-13 RX ADMIN — IBUPROFEN 800 MG: 800 TABLET ORAL at 14:27

## 2021-03-13 RX ADMIN — ACETAMINOPHEN 650 MG: 325 TABLET, FILM COATED ORAL at 07:24

## 2021-03-13 RX ADMIN — ACETAMINOPHEN 650 MG: 325 TABLET, FILM COATED ORAL at 14:27

## 2021-03-13 RX ADMIN — ACETAMINOPHEN 650 MG: 325 TABLET, FILM COATED ORAL at 22:23

## 2021-03-13 RX ADMIN — DOCUSATE SODIUM 50 MG AND SENNOSIDES 8.6 MG 1 TABLET: 8.6; 5 TABLET, FILM COATED ORAL at 08:27

## 2021-03-13 RX ADMIN — ACETAMINOPHEN 650 MG: 325 TABLET, FILM COATED ORAL at 02:58

## 2021-03-13 RX ADMIN — HYDROCORTISONE: 25 CREAM TOPICAL at 19:00

## 2021-03-13 RX ADMIN — DOCUSATE SODIUM 50 MG AND SENNOSIDES 8.6 MG 2 TABLET: 8.6; 5 TABLET, FILM COATED ORAL at 22:23

## 2021-03-13 RX ADMIN — FERROUS SULFATE TAB 325 MG (65 MG ELEMENTAL FE) 325 MG: 325 (65 FE) TAB at 08:27

## 2021-03-13 NOTE — PROGRESS NOTES
"Vaginal Delivery Postpartum Day 1    Patient Name:  Daniel Steiner  :      1990  MRN:      [id number]      Subjective:  Daniel Steiner is feeling well and happy with birth experience.  Reports little to no sleep overnight. States she had a headache but it is resolving. Denies any  visual changes or epigastric pain. States she is breastfeeding well and voiding without difficulty.  States vaginal bleeding has decreased.  Denies passing any large clots.  Pain is well controlled with current medications. Patient undecided on postpartum birth control.       Objective:  /78   Pulse 101   Temp 98.1  F (36.7  C) (Oral)   Resp 16   Ht 1.676 m (5' 6\")   Wt 81.6 kg (180 lb)   LMP 2020   Breastfeeding Unknown   BMI 29.05 kg/m      Breasts:soft, lactating   Abdomen:soft, non-tender, fundus firm @ 1 below U  Perineum: healing, no edema, lochia small rubra   Extremities: no edema         Immunization History   Administered Date(s) Administered     DTAP (<7y) 1990, 1991, 1992, 1992, 1996     HPV Quadrivalent 2008, 2008, 2009     Hep B, Peds or Adolescent 10/31/2002, 2003, 08/15/2003     Hib, Unspecified 1990, 1991, 1991, 1992     Influenza Vaccine IM > 6 months Valent IIV4 10/09/2018, 2020     MMR 1991, 10/31/2002     Polio, Unspecified  1990, 1991, 1992, 1996, 2003     Tdap (Adacel,Boostrix) 2020     Tdap (Adult) Unspecified Formulation 2003, 2014     Hgb 8.1  Platelets 129    Assessment:    -Stable PPD #1  -Fatigue  -Normotensive  -Gestational hypertension, stable  -Hgb and platelets stable   -Normal involution  -Breastfeeding      Plan:   -All questions answered.   -Continue routine care  -Plan for today: rest, breastfeeding on demand   -Plan discharge home tomorrow  -Reviewed warning signs of PIH and when to notify nurse with concerns       Provider:  Tamar Montero, " AMELIE KRAFT, STEPH    Date:  3/13/2021  Time:  10:22 AM    Patient Name:  Daniel Steiner  :  1990  MRN:  [id number]

## 2021-03-13 NOTE — PROGRESS NOTES
Pt with several elevated BPs in labor ranging from 116-156 systolic /  diastolic. Pt had no s/s preeclampsia. PIH labs shortly after admission showed:   Protein/creat ratio 0.41, AST 25, ALT 16, Platelets 148. Pt meets criteria for preeclampsia.     Pt remains asymptomatic postpartum. Bps have been stable 121-141 systolic / 69-93 diastolic. She did have one /101 at 1100 which was 124/82 on recheck. PIH labs recollected and stable. Hgb 8.9 so will start ferrous sulfate 325 mg daily  Platelets 127 (down from 148 on admit), recheck tomorrow morning.   Continue to monitor Bps and for s/s preeclampsia.     SWAPNIL Pires, CNM

## 2021-03-13 NOTE — PROVIDER NOTIFICATION
03/13/21 0404   Provider Notification   Provider Name/Title Divine CURRAN CNM   Method of Notification Phone   Notification Reason Vital Signs Change;Status Update   CNM updated on elevated BP's X 2 137/95, 145/94 and headache that is not resolved with Tylenol.  Order to recheck BP in 2 hours and continue to monitor.  Update CNM if BP is approaching severe range pressure of 160/100.

## 2021-03-13 NOTE — ANESTHESIA POSTPROCEDURE EVALUATION
Patient: Daniel Steiner    * No procedures listed *    Diagnosis:* No pre-op diagnosis entered *  Diagnosis Additional Information: No value filed.    Anesthesia Type:  Epidural    Note:  Disposition: Inpatient   Postop Pain Control: Uneventful            Sign Out: Well controlled pain   PONV: No   Neuro/Psych: Uneventful            Sign Out: Acceptable/Baseline neuro status   Airway/Respiratory: Uneventful            Sign Out: Acceptable/Baseline resp. status   CV/Hemodynamics: Uneventful            Sign Out: Acceptable CV status   Other NRE: NONE   DID A NON-ROUTINE EVENT OCCUR? No    Event details/Postop Comments:    Patient doing well.  Residual neuraxial block resolved with no reported numbness or paresthesias.  Ambulating, voiding, and eating without difficulty. Denies positional headache but does have a mild non-positional headache that is adequately treated with tylenol.  Pain well controlled. No bowel or bladder changes. Minimal back discomfort at epidural site. No apparent epidural complications.  Questions encouraged and answered.           Last vitals:  Vitals:    03/13/21 0303 03/13/21 0404 03/13/21 0604   BP: (!) 137/95 (!) 145/94 123/76   Pulse: 94 92 87   Resp: 18     Temp: 97.9  F (36.6  C)         Last vitals prior to Anesthesia Care Transfer:      Electronically Signed By: Rigoberto Rea MD  March 13, 2021  7:33 AM

## 2021-03-13 NOTE — PLAN OF CARE
Vital signs within normal limits. Postpartum checks within normal limits - see flow record. Patient eating and drinking normally. Patient able to empty bladder independently and is up ambulating. . Patient performing self cares and is able to care for infant.  Positive attachment behaviors observed with infant.

## 2021-03-13 NOTE — PLAN OF CARE
Pt had stable BPs at shift, first half of shift pt admitted to headache and it got resolved after having medication and being rested; breast feeding independently with shield, encouraged to call for help with latch, lactation RN available, calm, pleasant, plan of care and expectations reviewed with pt.

## 2021-03-13 NOTE — LACTATION NOTE
Lactation in to see patient. Baby attempting to breastfeed at time of visit. Nipples shallow, baby unable to sustain latch. Baby doing tongue thrusting. Small shield applied. Good latch with sucking and swallowing noted. Colostrum seen in shield. Basic breastfeeding information given. Encouraged breast compressions.

## 2021-03-13 NOTE — PLAN OF CARE
"BP's elevated this shift. Patient reports headache and \"tightness\" in her hands; denies any visual disturbances or epigastric pain, no edema present. Reflexes +3 and +2; no clonus noted.  CNM updated; plan to recheck BP in AM and continue plan to redraw labs. Will continue to monitor.  Patient is ambulating and voiding without difficulty.  Pain is controlled with ibuprofen and Tylenol.  Patient is breastfeeding independently with a shield; encouraged to call out for assistance with latch as needed.  Patient and SO bonding well with infant and independent with cares.  ROP paperwork in room; needs notary signature.    "

## 2021-03-13 NOTE — PLAN OF CARE
BP recheck WDL: 123/76 and headache improving. Continues to deny visual disturbances and epigastric pain; tightness in hands remains.

## 2021-03-14 VITALS
SYSTOLIC BLOOD PRESSURE: 127 MMHG | HEIGHT: 66 IN | TEMPERATURE: 98 F | DIASTOLIC BLOOD PRESSURE: 85 MMHG | WEIGHT: 180 LBS | RESPIRATION RATE: 16 BRPM | HEART RATE: 101 BPM | BODY MASS INDEX: 28.93 KG/M2

## 2021-03-14 PROCEDURE — 250N000013 HC RX MED GY IP 250 OP 250 PS 637: Performed by: ADVANCED PRACTICE MIDWIFE

## 2021-03-14 RX ORDER — HYDROCORTISONE 2.5 %
CREAM (GRAM) TOPICAL 3 TIMES DAILY PRN
COMMUNITY
Start: 2021-03-14 | End: 2021-06-30

## 2021-03-14 RX ORDER — ACETAMINOPHEN 325 MG/1
650 TABLET ORAL EVERY 4 HOURS PRN
COMMUNITY
Start: 2021-03-14 | End: 2021-03-26

## 2021-03-14 RX ORDER — IBUPROFEN 800 MG/1
800 TABLET, FILM COATED ORAL EVERY 6 HOURS PRN
COMMUNITY
Start: 2021-03-14 | End: 2021-06-30

## 2021-03-14 RX ORDER — ADHESIVE BANDAGE 3/4"
BANDAGE TOPICAL
Qty: 1 EACH | Refills: 0 | Status: SHIPPED | OUTPATIENT
Start: 2021-03-14 | End: 2021-06-30

## 2021-03-14 RX ORDER — AMOXICILLIN 250 MG
2 CAPSULE ORAL 2 TIMES DAILY
COMMUNITY
Start: 2021-03-14 | End: 2021-03-26

## 2021-03-14 RX ADMIN — FERROUS SULFATE TAB 325 MG (65 MG ELEMENTAL FE) 325 MG: 325 (65 FE) TAB at 08:01

## 2021-03-14 RX ADMIN — ACETAMINOPHEN 650 MG: 325 TABLET, FILM COATED ORAL at 10:30

## 2021-03-14 RX ADMIN — DOCUSATE SODIUM 50 MG AND SENNOSIDES 8.6 MG 1 TABLET: 8.6; 5 TABLET, FILM COATED ORAL at 08:01

## 2021-03-14 RX ADMIN — IBUPROFEN 800 MG: 800 TABLET ORAL at 08:01

## 2021-03-14 NOTE — PLAN OF CARE
Patients blood pressure is just slightly elevated. She denies any headache, blurred vision or epigastric pain. Reflexes are normal and no clonus is noted.  She does have hemorrhoid tissue - tucks and hydrocortisone creme was given. Mother is more comfortable with nursing on her own; using a shield.

## 2021-03-14 NOTE — PLAN OF CARE
VSS, headache and cramping controlled with tylenol and ibuprofen, breast feeding with shield well on both sides; discharge education completed, discussed follow up in 2 weeks (virtual) and in 6 weeks with OB Dr for PP visit, talked about checking BP tomorrow at home and on 03/19/21 per MD order, script given for BP cuff to obtain, discussed s/s of preeclampsia, eligible for home care, explained and faxed, awaiting for her partner to complete  ROP form and will be discharging after that.

## 2021-03-14 NOTE — DISCHARGE SUMMARY
[facility]    Discharge Summary  Obstetrics    Date of Admission:  3/11/2021  Date of Discharge:  3/14/2021  Discharging Provider: Divine Mcgee  Date of Service (when I saw the patient): 21    Discharge Diagnoses   PPD2 s/p   Preeclampsia without severe features     History of Present Illness   Daniel Steiner is a 30 year old  female who presented in spontaneous labor at 39w3d ega. Stuart was noted to have elevated blood pressures on admission over 140s/90s. PIH labs were collected and were normal except for elevated protein/creat ratio 0.41. She was asymptomatic.   Labor progressed normally and pt delivered a viable female infant under epidural anesthesia. Infant weighed 6lb 7oz with apgars 9/9.     Hospital Course   The patient's hospital course complicated by preeclampsia without severe features.  Postpartum blood pressures have been stable with readings today in 120s-80s. She continues to be asymptomatic and denies headache, visual changes, ruq or epigastric pain.     She recovered as anticipated.  On discharge, her pain was well controlled. Vaginal bleeding is similar to peak menstrual flow.  Voiding without difficulty.  Ambulating well and tolerating a normal diet.  No fevers.  Breastfeeding well.  Infant is stable.  She was discharged on post-partum day #2.    Physical Exam  Constitutional:       Appearance: Normal appearance.   Genitourinary:      Vulva normal.      Vaginal discharge (moderate lochia rubra) present.   HENT:      Head: Normocephalic and atraumatic.   Eyes:      Pupils: Pupils are equal, round, and reactive to light.   Pulmonary:      Effort: Pulmonary effort is normal.   Abdominal:      General: Abdomen is flat. There is no distension.      Palpations: Abdomen is soft.      Tenderness: There is no abdominal tenderness.      Comments: U-2   Musculoskeletal: Normal range of motion.         General: No swelling or tenderness.   Neurological:      General: No  focal deficit present.      Mental Status: She is alert and oriented to person, place, and time.   Skin:     General: Skin is warm and dry.   Psychiatric:         Mood and Affect: Mood normal.   Vitals signs reviewed.         Post-partum hemoglobin:   Hemoglobin   Date Value Ref Range Status   2021 8.1 (L) 11.7 - 15.7 g/dL Final       Divine Mcgee CNM    Discharge Disposition   Discharged to home   Condition at discharge: Stable  PPD2 s/p   Breastfeeding  Rh positive  Rubella immune  Tdap given during prenatal course  Preeclampsia without severe features - pt will  BP cuff and check blood pressure on PPD3 and PPD7  Subclinical hyperthyroidism - Recheck TSH/T4 postpartum, refer to endo if still abnormal.  Acute blood loss anemia - continue 325mg ferrous sulfate     Discussed postpartum warning signs for preeclampsia, DVT/PE, hemorrhage, infection, mastitis, and postpartum depression.        Instructed on the following:  Blood pressure monitoring:     Please take your blood pressure on 3/15 and 3/19.   Take your blood pressure right away in the morning, or after you've been resting for 15 minutes   Please send the midwives a [facility] message or call the clinic with your results.   Ideally, your blood pressure should be under 140/90   If you are experiencing any of the symptoms above of preeclampsia, or if your blood pressure is 160/100, please call us immediately and you need to go to the hospital.   Please follow up in person for you 2 and 6 week visits        Primary Care Physician   Physician No Ref-Primary    Consultations This Hospital Stay   ANESTHESIOLOGY IP CONSULT  HOME CARE POST PARTUM/ IP CONSULT  LACTATION IP CONSULT    Discharge Orders      Activity    Review discharge instructions     Reason for your hospital stay    Maternity care     Follow Up and recommended labs and tests    Take your blood pressure at home and message us with the results. You should take your blood pressure on  3/15 and 3/19.   Please schedule an in person visit for 2 weeks postpartum and 6 weeks postpartum.     Discharge Instructions - Postpartum visit    Schedule postpartum visit with your provider and return to clinic in 2 and 6 weeks.     Discharge Instructions - Hypertensive disorders patients    High Blood pressure patients to follow up in clinic or via home care within one week for a blood pressure check     Diet    Resume previous diet     Discharge Medications   Current Discharge Medication List      START taking these medications    Details   acetaminophen (TYLENOL) 325 MG tablet Take 2 tablets (650 mg) by mouth every 4 hours as needed for mild pain or fever (greater than or equal to 38  C /100.4  F (oral) or 38.5  C/ 101.4  F (core).)  Qty:      Associated Diagnoses: Postpartum state      Blood Pressure Monitoring (BLOOD PRESSURE CUFF) MISC Take blood pressure on 3/15 and 3/19  Qty: 1 each, Refills: 0    Associated Diagnoses: Preeclampsia in postpartum period      hydrocortisone 2.5 % cream Place rectally 3 times daily as needed (hemorrhoids)  Qty:      Associated Diagnoses: Postpartum state      ibuprofen (ADVIL/MOTRIN) 800 MG tablet Take 1 tablet (800 mg) by mouth every 6 hours as needed for other (cramping)  Qty:      Associated Diagnoses: Postpartum state      senna-docusate (SENOKOT-S/PERICOLACE) 8.6-50 MG tablet Take 2 tablets by mouth 2 times daily  Qty:      Associated Diagnoses: Postpartum state         CONTINUE these medications which have NOT CHANGED    Details   ferrous sulfate (FE TABS) 325 (65 Fe) MG EC tablet Take 1 tablet (325 mg) by mouth daily  Qty: 90 tablet, Refills: 3    Associated Diagnoses: Anemia during pregnancy in first trimester      Prenatal Vit-Fe Fumarate-FA (PRENATAL MULTIVITAMIN W/IRON) 27-0.8 MG tablet Take 1 tablet by mouth daily  Qty: 90 tablet, Refills: 3    Associated Diagnoses: Encounter for supervision of other normal pregnancy in first trimester           Allergies   No  Known Allergies

## 2021-03-14 NOTE — LACTATION NOTE
Lactation in to full up.  Mom struggling at time of visit. Writer assisted with latch. Baby latched better with football hold. Sucks and swallows heard with shield. No further questions. Encouraged patient to call for assistance prn.

## 2021-03-14 NOTE — DISCHARGE INSTRUCTIONS
Postpartum Vaginal Delivery Instructions    Activity       Ask family and friends for help when you need it.    Do not place anything in your vagina for 6 weeks.    You are not restricted on other activities, but take it easy for a few weeks to allow your body to recover from delivery.  You are able to do any activities you feel up to that point.    No driving until you have stopped taking your pain medications (usually two weeks after delivery).     Call your health care provider if you have any of these symptoms:       Increased pain, swelling, redness, or fluid around your stiches from an episiotomy or perineal tear.    A fever above 100.4 F (38 C) with or without chills when placing a thermometer under your tongue.    You soak a sanitary pad with blood within 1 hour, or you see blood clots larger than a golf ball.    Bleeding that lasts more than 6 weeks.    Vaginal discharge that smells bad.    Severe pain, cramping or tenderness in your lower belly area.    A need to urinate more frequently (use the toilet more often), more urgently (use the toilet very quickly), or it burns when you urinate.    Nausea and vomiting.    Redness, swelling or pain around a vein in your leg.    Problems breastfeeding or a red or painful area on your breast.    Chest pain and cough or are gasping for air.    Problems coping with sadness, anxiety, or depression.  If you have any concerns about hurting yourself or the baby, call your provider immediately.     You have questions or concerns after you return home.     Keep your hands clean:  Always wash your hands before touching your perineal area and stitches.  This helps reduce your risk of infection.  If your hands aren't dirty, you may use an alcohol hand-rub to clean your hands. Keep your nails clean and short.    Preeclampsia   Call your doctor right away if you have any of the following:  - Edema (swelling) in your face or hands  - Rapid weight gain-about 1 pound or more in a  day  - Headache  - Abdominal pain on your right side  - Vision problems (flashes or spots)  - You have questions or concerns once you return home.    Blood pressure monitoring:    Please take your blood pressure on 3/15 and 3/19.   Take your blood pressure right away in the morning, or after you've been resting for 15 minutes   Please send the midwives a [employer] message or call the clinic with your results.   Ideally, your blood pressure should be under 140/90  If you are experiencing any of the symptoms above of preeclampsia, or if your blood pressure is 160/100, please call us immediately and you need to go to the hospital.   Please follow up in person for you 2 and 6 week visits

## 2021-03-14 NOTE — PLAN OF CARE
Pt meeting expected outcomes. Vitals stable. Fundus firm and midline. Denies difficulty voiding. Pain controlled with oral medications. Independent with self and baby cares. Breastfeeding , using a shield, is going well now, pt able to latch baby independently. Anticipated discharge home today.

## 2021-03-14 NOTE — LACTATION NOTE
Lactation following up with patient. Patient states baby nursing better. No questions or concerns. Home today.

## 2021-03-16 PROBLEM — D62 ANEMIA DUE TO BLOOD LOSS, ACUTE: Status: ACTIVE | Noted: 2021-03-16

## 2021-03-26 ENCOUNTER — VIRTUAL VISIT (OUTPATIENT)
Dept: OBGYN | Facility: CLINIC | Age: 31
End: 2021-03-26
Payer: COMMERCIAL

## 2021-03-26 DIAGNOSIS — D62 ANEMIA DUE TO BLOOD LOSS, ACUTE: ICD-10-CM

## 2021-03-26 DIAGNOSIS — E05.90 SUBCLINICAL HYPERTHYROIDISM: ICD-10-CM

## 2021-03-26 PROCEDURE — 99207 PR POST PARTUM EXAM: CPT | Performed by: ADVANCED PRACTICE MIDWIFE

## 2021-03-26 NOTE — PROGRESS NOTES
Daniel Steiner is a 30 year old female who is being evaluated via a billable telephone visit.      What phone number would you like to be contacted at? [phone]  How would you like to obtain your AVS? Florencio      Daniel Steiner is a 30 year old female who presents for virtual visit today for the following health issue(s):  2 week postpartum    Midwife Postpartum 2 Week Visit    Daniel Steiner is a 30 year old here for a 2 week postpartum checkup.     Delivery date was 3/12/2021. She had a  of a viable girl, weight 6 pounds 7.4 oz., with preeclampsia without severe features.       Since delivery, she has been breast feeding and pumping with a great supply already established. She has not had any signs of infection. Her lochia is now scant.  She has not had other complications.     She checks her blood pressures daily. Yesterday's reading was 130/90, but she hasn't checked today. She states they have been consistently in the 130s/90s. She denies any s/s preeclampsia.     She is voiding and having bowel movements without difficulty.       Contraception was discussed and patient desires unsure. Reviewed progesterone-only methods.   She  has not had intercourse since delivery.   She complains of No  perineal discomfort.     Silviano is off until prison through May  Stuart goes back to work middle of     Mood is Stable EPDS 4  Patient screened for postpartum depression.   Depression Rating was:   Last PHQ-9 score on record = No flowsheet data found.  Last GAD7 score on record = No flowsheet data found.  Alcohol Score = 0    ROS:  CONSTITUTIONAL: NEGATIVE for fever, chills, change in weight  INTEGUMENTARU/SKIN: NEGATIVE for worrisome rashes, moles or lesions  EYES: NEGATIVE for vision changes or irritation  ENT: NEGATIVE for ear, mouth and throat problems  RESP: NEGATIVE for significant cough or SOB  BREAST: NEGATIVE for masses, tenderness or discharge  CV: NEGATIVE for chest pain, palpitations or peripheral  edema  GI: NEGATIVE for nausea, abdominal pain, heartburn, or change in bowel habits  : NEGATIVE for unusual urinary or vaginal symptoms.   MUSCULOSKELETAL: NEGATIVE for significant arthralgias or myalgia  NEURO: NEGATIVE for weakness, dizziness or paresthesias  PSYCHIATRIC: NEGATIVE for changes in mood or affect      Current Outpatient Medications:      acetaminophen (TYLENOL) 325 MG tablet, Take 2 tablets (650 mg) by mouth every 4 hours as needed for mild pain or fever (greater than or equal to 38  C /100.4  F (oral) or 38.5  C/ 101.4  F (core).), Disp:  , Rfl:      Blood Pressure Monitoring (BLOOD PRESSURE CUFF) MISC, Take blood pressure on 3/15 and 3/19, Disp: 1 each, Rfl: 0     ferrous sulfate (FE TABS) 325 (65 Fe) MG EC tablet, Take 1 tablet (325 mg) by mouth daily, Disp: 90 tablet, Rfl: 3     hydrocortisone 2.5 % cream, Place rectally 3 times daily as needed (hemorrhoids), Disp:  , Rfl:      ibuprofen (ADVIL/MOTRIN) 800 MG tablet, Take 1 tablet (800 mg) by mouth every 6 hours as needed for other (cramping), Disp:  , Rfl:      Prenatal Vit-Fe Fumarate-FA (PRENATAL MULTIVITAMIN W/IRON) 27-0.8 MG tablet, Take 1 tablet by mouth daily, Disp: 90 tablet, Rfl: 3     senna-docusate (SENOKOT-S/PERICOLACE) 8.6-50 MG tablet, Take 2 tablets by mouth 2 times daily, Disp:  , Rfl: .   OB History    Para Term  AB Living   3 1 1 0 2 1   SAB TAB Ectopic Multiple Live Births   0 0 0 0 1      # Outcome Date GA Lbr Octavio/2nd Weight Sex Delivery Anes PTL Lv   3 Term 21 39w3d 05:00 / 02:13 2.93 kg (6 lb 7.4 oz) F Vag-Spont EPI, IV N HÉCTOR      Name: ELIANAFEMALE-DAVID      Apgar1: 9  Apgar5: 9   2 AB 20 7w0d          1 AB 18 6w0d            Last pap:  No results found for: PAP  Hgb in hospital was 8.1    EXAM:  LMP 2020   BMI: There is no height or weight on file to calculate BMI.  Exam:  Constitutional: healthy, alert and no distress  Remainder of exam deferred d/t phone  visit    ASSESSMENT/PLAN:  1. (Z39.2) Routine postpartum follow-up  (primary encounter diagnosis)      2. (E05.90) Subclinical hyperthyroidism  - Check TSH at 6wk visit  - Refer to endocrinology if abnormal     3. (D62) Anemia due to blood loss, acute  - recommended she cut back to taking iron every other day instead of daily   - recheck CBC at 6wk visit     4. (O14.04) Mild preeclampsia delivered  -  Bps stable 130s/90s, asymptomatic   - Check BP weekly. Call with s/s preeclampsia or Bps approaching severe range 160/100s      Return for 6 week postpartum visit.  Teaching: birth control  Family Planning:unsure    Next visit: Pap, CBC, TSH    SWAPNIL Pires, CNM      20 minutes spent on the date of the encounter doing chart review, review of test results, patient visit and documentation

## 2021-04-23 ENCOUNTER — PRENATAL OFFICE VISIT (OUTPATIENT)
Dept: OBGYN | Facility: CLINIC | Age: 31
End: 2021-04-23
Payer: COMMERCIAL

## 2021-04-23 VITALS — WEIGHT: 150 LBS | BODY MASS INDEX: 24.21 KG/M2 | DIASTOLIC BLOOD PRESSURE: 76 MMHG | SYSTOLIC BLOOD PRESSURE: 108 MMHG

## 2021-04-23 DIAGNOSIS — Z86.39 HISTORY OF THYROID DISEASE: Primary | ICD-10-CM

## 2021-04-23 PROBLEM — O09.93 HIGH-RISK PREGNANCY, THIRD TRIMESTER: Status: RESOLVED | Noted: 2021-02-04 | Resolved: 2021-04-23

## 2021-04-23 PROBLEM — D62 ANEMIA DUE TO BLOOD LOSS, ACUTE: Status: RESOLVED | Noted: 2021-03-16 | Resolved: 2021-04-23

## 2021-04-23 PROBLEM — R03.0 ELEVATED BLOOD PRESSURE READING WITHOUT DIAGNOSIS OF HYPERTENSION: Status: RESOLVED | Noted: 2021-02-04 | Resolved: 2021-04-23

## 2021-04-23 LAB
ERYTHROCYTE [DISTWIDTH] IN BLOOD BY AUTOMATED COUNT: 12.6 % (ref 10–15)
HCT VFR BLD AUTO: 36.8 % (ref 35–47)
HGB BLD-MCNC: 12 G/DL (ref 11.7–15.7)
MCH RBC QN AUTO: 29.9 PG (ref 26.5–33)
MCHC RBC AUTO-ENTMCNC: 32.6 G/DL (ref 31.5–36.5)
MCV RBC AUTO: 92 FL (ref 78–100)
PLATELET # BLD AUTO: 173 10E9/L (ref 150–450)
RBC # BLD AUTO: 4.01 10E12/L (ref 3.8–5.2)
WBC # BLD AUTO: 6.3 10E9/L (ref 4–11)

## 2021-04-23 PROCEDURE — 84443 ASSAY THYROID STIM HORMONE: CPT | Performed by: ADVANCED PRACTICE MIDWIFE

## 2021-04-23 PROCEDURE — 99207 PR POST PARTUM EXAM: CPT | Performed by: ADVANCED PRACTICE MIDWIFE

## 2021-04-23 PROCEDURE — 84439 ASSAY OF FREE THYROXINE: CPT | Performed by: ADVANCED PRACTICE MIDWIFE

## 2021-04-23 PROCEDURE — 85027 COMPLETE CBC AUTOMATED: CPT | Performed by: ADVANCED PRACTICE MIDWIFE

## 2021-04-23 PROCEDURE — 36415 COLL VENOUS BLD VENIPUNCTURE: CPT | Performed by: ADVANCED PRACTICE MIDWIFE

## 2021-04-23 NOTE — PROGRESS NOTES
Midwife Postpartum 6 Week Visit    Daniel Steiner is a 30 year old here for a postpartum checkup.     Delivery date was 3/12/21. She had a  of a viable girl, weight 6 pounds 7.4 oz., with Preeclampsia without severe features.      Since delivery, she has been breast feeding.  She has not had any signs of infection, her lochia stopped after 3 weeks, now first period.  She has not had other complications.    She is voiding and having bowel movements without difficulty.      Contraception was discussed and patient desires condoms.   She  has not had intercourse since delivery.   She complains of mild  perineal discomfort.     Mood is Stable  Patient screened for postpartum depression-- EPDS 2    First covid vaccination Tuesday!    ROS:  12 point review of systems otherwise negative      Current Outpatient Medications:      Blood Pressure Monitoring (BLOOD PRESSURE CUFF) MISC, Take blood pressure on 3/15 and 3/19, Disp: 1 each, Rfl: 0     ferrous sulfate (FE TABS) 325 (65 Fe) MG EC tablet, Take 1 tablet (325 mg) by mouth daily, Disp: 90 tablet, Rfl: 3     hydrocortisone 2.5 % cream, Place rectally 3 times daily as needed (hemorrhoids), Disp:  , Rfl:      ibuprofen (ADVIL/MOTRIN) 800 MG tablet, Take 1 tablet (800 mg) by mouth every 6 hours as needed for other (cramping), Disp:  , Rfl:      Prenatal Vit-Fe Fumarate-FA (PRENATAL MULTIVITAMIN W/IRON) 27-0.8 MG tablet, Take 1 tablet by mouth daily, Disp: 90 tablet, Rfl: 3.   OB History    Para Term  AB Living   3 1 1 0 2 1   SAB TAB Ectopic Multiple Live Births   0 0 0 0 1      # Outcome Date GA Lbr Octavio/2nd Weight Sex Delivery Anes PTL Lv   3 Term 21 39w3d 05:00 / 02:13 2.93 kg (6 lb 7.4 oz) F Vag-Spont EPI, IV N HÉCTOR      Name: ELIANA,FEMALE-DANIEL      Apgar1: 9  Apgar5: 9   2 AB / 7w0d          1 AB 18 6w0d            Last pap:  No results found for: PAP  Hgb in hospital was 8.1    EXAM:  LMP 2020   BMI: There is no height or  weight on file to calculate BMI.  Exam:  Constitutional: healthy, alert and no distress  Head: Normocephalic. No masses, lesions, tenderness or abnormalities  Neck: Neck supple.  Cardiovascular: normotenstive  Respiratory: unlabored respirations  Breasts: Deferred as patient is lactating  Gastrointestinal: Abdomen soft, non-tender. BS normal. No masses, organomegaly  Musculoskeletal: extremities normal- no gross deformities noted, gait normal and normal muscle tone  Skin: no suspicious lesions or rashes  Neurologic: Gait normal. Sensation grossly WNL.  Psychiatric: mentation appears normal and affect normal/bright    PELVIC EXAM:  No concerns, deferred      ASSESSMENT:   Normal postpartum exam after .  Blood loss anemia    PLAN:  -Repeat CBC & TSH today  -Referral to endocrine for further management of hypothyroid  Return as needed or at time of next expected pap, pelvic, or breast exam.  Teaching: self breast exam, exercise, birth control and weight/diet  Family Planning:condoms  Encourage Kegels and abdominal exercise.  Continue a multivitamin/prenatal supplement, especially if breastfeeding.  Pap smear was not obtained today per pt preference.  Postpartum Hgb was done today.    Pebbles Owens, SWAPNIL, CNM

## 2021-04-24 LAB
T4 FREE SERPL-MCNC: 0.92 NG/DL (ref 0.76–1.46)
TSH SERPL DL<=0.005 MIU/L-ACNC: 0.37 MU/L (ref 0.4–4)

## 2021-04-26 PROBLEM — O99.013 ANEMIA DURING PREGNANCY IN THIRD TRIMESTER: Status: RESOLVED | Noted: 2021-01-15 | Resolved: 2021-04-26

## 2021-04-26 PROBLEM — Z36.89 ENCOUNTER FOR TRIAGE IN PREGNANT PATIENT: Status: RESOLVED | Noted: 2021-03-11 | Resolved: 2021-04-26

## 2021-05-12 ENCOUNTER — MEDICAL CORRESPONDENCE (OUTPATIENT)
Dept: HEALTH INFORMATION MANAGEMENT | Facility: CLINIC | Age: 31
End: 2021-05-12

## 2021-06-30 ENCOUNTER — OFFICE VISIT (OUTPATIENT)
Dept: OBGYN | Facility: CLINIC | Age: 31
End: 2021-06-30
Payer: COMMERCIAL

## 2021-06-30 VITALS
DIASTOLIC BLOOD PRESSURE: 72 MMHG | BODY MASS INDEX: 23.56 KG/M2 | SYSTOLIC BLOOD PRESSURE: 110 MMHG | WEIGHT: 146.6 LBS | HEIGHT: 66 IN

## 2021-06-30 DIAGNOSIS — O92.4 DECREASED LACTATION: ICD-10-CM

## 2021-06-30 DIAGNOSIS — Z86.39 HISTORY OF THYROID DISEASE: ICD-10-CM

## 2021-06-30 PROCEDURE — 99213 OFFICE O/P EST LOW 20 MIN: CPT | Performed by: ADVANCED PRACTICE MIDWIFE

## 2021-06-30 ASSESSMENT — MIFFLIN-ST. JEOR: SCORE: 1401.72

## 2021-06-30 NOTE — PROGRESS NOTES
"SUBJECTIVE:    Daniel Steiner, \"Stuart\", a 30 year old , presents by herself for a lactation consultation. She is concerned about decreased milk production since she began exclusively pumping one month ago.    Stuart had a  of baby girl Isabelle on 3/12/21 complicated only by preeclampsia without severe features. She had no ongoing hypertensive issues postpartum. She established an adequate milk supply early on and had no issues with Keiko's latch.    She exclusively breast fed for the first few weeks postpartum then started adding in pumping sessions in anticipation of returning to work. She started a new job after her leave with [employer]; her training and work is all virtual, which affords her the time to stay home. Baby is at home with her one day a week and with family members the rest of the week while she works. They are usually reunited around 5pm.    Other than some expected stress with starting a new job, she reports no new physical/emotional symptoms since her 6 week PP exam, at which time her hemoglobin was WNL and her thyroid consistent with subclinical hyperthyroidism. She has not followed up with endocrine due to full schedules for several months.    Prior to starting her job, she was pumping 8-9x/day and feeding baby directly at the breast 3-4x/day. She transitioned to exclusively pumping when she started work four weeks ago, around 12 weeks postpartum. She was pumping approximately 25 oz per day at that time, which is appropriate for baby's daily intake requirements. Keiko has been consistently growing with normal wet/dirty diapers, so it seems she has been producing enough consistently to meet her demands. However, she has noticed a gradual but overall considerable decrease in her supply within the last two weeks. Yesterday, she was only able to pump 11oz total; 13oz the day before that. She pumps 5-6oz during her morning sessions but subsequent sessions only amount to 1-2oz each. Keiko had a " "few days with formula supplementation last week after she exhausted her stored milk supply. She is now taking donor milk as needed.    She has used the following pumps since delivery: Medela, Baby Buddah, and Tiara. She has used the Baby Buddah the most. There has not been a recent switch in pumps that correspond to her decreased milk production. Flange size seems appropriate as she denies experiencing any pain, nipple blanching, air pockets, or inefficient suction.    She was pumping 5x/day after her return to work but has increased back up to 8x/day since the decline, averaging about 6-7 sessions per day for at least 15-20 minutes. She has already tried power pumping for several days in a row with no improvement.    She is taking no medications such as antihistamines or CHCs that may impact supply. She is only taking lactation gummies, which she started immediately postpartum. Stopped her prenatal vitamin a couple weeks ago when she ran out. She has no excessive caffeine/alcohol use; drinks a half cup of coffee a day and has an alcoholic beverage once a week.      OBJECTIVE:  /72 (BP Location: Left arm, Cuff Size: Adult Regular)   Ht 1.676 m (5' 6\")   Wt 66.5 kg (146 lb 9.6 oz)   LMP 06/01/2021 (Exact Date)   Breastfeeding Yes   BMI 23.66 kg/m      Exam:  Constitutional: healthy, alert and no distress  Breasts: deferred  Psych: mentation appears normal and affect normal/bright    EPDS=0    ASSESSMENT:  (Z39.1) Care and examination of lactating mother  (primary encounter diagnosis)  (O92.4) Decreased lactation  (Z86.39) History of thyroid disease  Plan: TSH, T4, free      PLAN  - Reviewed supply/demand and efficient stimulation/emptying. Stuart is willing to have Keiko directly feed when she's able/if Keiko is interested. Encouraged her to do this as often as possible rather than primarily depending on pump.  - Discussed frequency and duration of pumping sessions. Recommend stimulating breasts at least " 8x/24 hours. Encouraged at least one pumping/feeding session by 6am, when prolactin peaks.   - Encouraged switching between let-down/expression modes more than once per pumping session to mimic multiple let-downs typically triggered by the infant at breast  - Encouraged stimulating breasts at least 8x per day.  - Discussed hands-on pumping and directed patient to links in AVS for demonstrations/reinforcement of technique  - Check TSH/T4 today to r/o thyroid issues  - Advised resuming and continuing prenatal vitamin for the first year postpartum    RTC if ongoing concerns or latch/feed assessment if desired.      Sirisha Salvador, DNP, APRN, CNM      25 minutes was spent face to face with the patient today discussing her history, diagnosis, and follow-up plan as noted above. Over 50% of the visit was spent in counseling and coordination of care.

## 2021-06-30 NOTE — PATIENT INSTRUCTIONS
Exclusive Pumping article by dianne:  [email or web address]    Other resources:  - [email or web address].com  - hands-on pumping videos ([employer])

## 2021-07-12 ENCOUNTER — MEDICAL CORRESPONDENCE (OUTPATIENT)
Dept: HEALTH INFORMATION MANAGEMENT | Facility: CLINIC | Age: 31
End: 2021-07-12

## 2021-07-23 ENCOUNTER — TELEPHONE (OUTPATIENT)
Dept: MIDWIFE SERVICES | Facility: CLINIC | Age: 31
End: 2021-07-23

## 2021-07-23 DIAGNOSIS — Z32.01 PREGNANCY TEST POSITIVE: Primary | ICD-10-CM

## 2021-07-23 NOTE — TELEPHONE ENCOUNTER
LMP 6/1 7w3d    Pt calling thinking she is miscarrying.  States she has had nausea for the past week that suddenly stopped.  Also notes cramping, denies bleeding.    Is concerned because her first miscarriage she didn't have any bleeding or sx.    Has PN appts scheduled, US is on 8/4.    Please advise.    Jaimee Ghosh RN

## 2021-07-23 NOTE — TELEPHONE ENCOUNTER
Moved US to next available 7/28.  Ordered.  Pt advised.    She will call with any bleeding or abd pain.    Jaimee Ghosh RN

## 2021-07-23 NOTE — TELEPHONE ENCOUNTER
Can we either get her in a little earlier for ultrasound, or do serial HCGs.    Thank you,    SWAPNIL Bowser, BILLM

## 2021-07-28 ENCOUNTER — PRENATAL OFFICE VISIT (OUTPATIENT)
Dept: NURSING | Facility: CLINIC | Age: 31
End: 2021-07-28
Payer: COMMERCIAL

## 2021-07-28 ENCOUNTER — ANCILLARY PROCEDURE (OUTPATIENT)
Dept: ULTRASOUND IMAGING | Facility: CLINIC | Age: 31
End: 2021-07-28
Payer: COMMERCIAL

## 2021-07-28 ENCOUNTER — LAB (OUTPATIENT)
Dept: LAB | Facility: CLINIC | Age: 31
End: 2021-07-28
Payer: COMMERCIAL

## 2021-07-28 DIAGNOSIS — Z34.80 PRENATAL CARE, SUBSEQUENT PREGNANCY, UNSPECIFIED TRIMESTER: ICD-10-CM

## 2021-07-28 DIAGNOSIS — Z34.80 PRENATAL CARE, SUBSEQUENT PREGNANCY, UNSPECIFIED TRIMESTER: Primary | ICD-10-CM

## 2021-07-28 DIAGNOSIS — Z32.01 PREGNANCY TEST POSITIVE: ICD-10-CM

## 2021-07-28 DIAGNOSIS — Z86.39 HISTORY OF THYROID DISEASE: ICD-10-CM

## 2021-07-28 LAB
ABO/RH(D): NORMAL
ANTIBODY SCREEN: NEGATIVE
ERYTHROCYTE [DISTWIDTH] IN BLOOD BY AUTOMATED COUNT: 13.3 % (ref 10–15)
HBV SURFACE AG SERPL QL IA: NONREACTIVE
HCT VFR BLD AUTO: 37.5 % (ref 35–47)
HCV AB SERPL QL IA: NONREACTIVE
HGB BLD-MCNC: 12.6 G/DL (ref 11.7–15.7)
HIV 1+2 AB+HIV1 P24 AG SERPL QL IA: NONREACTIVE
MCH RBC QN AUTO: 30.1 PG (ref 26.5–33)
MCHC RBC AUTO-ENTMCNC: 33.6 G/DL (ref 31.5–36.5)
MCV RBC AUTO: 90 FL (ref 78–100)
PLATELET # BLD AUTO: 191 10E3/UL (ref 150–450)
RBC # BLD AUTO: 4.18 10E6/UL (ref 3.8–5.2)
SPECIMEN EXPIRATION DATE: NORMAL
T4 FREE SERPL-MCNC: 1.15 NG/DL (ref 0.76–1.46)
TSH SERPL DL<=0.005 MIU/L-ACNC: 0.08 MU/L (ref 0.4–4)
WBC # BLD AUTO: 6.5 10E3/UL (ref 4–11)

## 2021-07-28 PROCEDURE — 76801 OB US < 14 WKS SINGLE FETUS: CPT | Performed by: OBSTETRICS & GYNECOLOGY

## 2021-07-28 PROCEDURE — 36415 COLL VENOUS BLD VENIPUNCTURE: CPT

## 2021-07-28 PROCEDURE — 86762 RUBELLA ANTIBODY: CPT

## 2021-07-28 PROCEDURE — 86780 TREPONEMA PALLIDUM: CPT

## 2021-07-28 PROCEDURE — 87086 URINE CULTURE/COLONY COUNT: CPT

## 2021-07-28 PROCEDURE — 87340 HEPATITIS B SURFACE AG IA: CPT

## 2021-07-28 PROCEDURE — 86900 BLOOD TYPING SEROLOGIC ABO: CPT

## 2021-07-28 PROCEDURE — 87389 HIV-1 AG W/HIV-1&-2 AB AG IA: CPT

## 2021-07-28 PROCEDURE — 86901 BLOOD TYPING SEROLOGIC RH(D): CPT

## 2021-07-28 PROCEDURE — 99207 PR NO CHARGE NURSE ONLY: CPT

## 2021-07-28 PROCEDURE — 84439 ASSAY OF FREE THYROXINE: CPT

## 2021-07-28 PROCEDURE — 85027 COMPLETE CBC AUTOMATED: CPT

## 2021-07-28 PROCEDURE — 86850 RBC ANTIBODY SCREEN: CPT

## 2021-07-28 PROCEDURE — 86803 HEPATITIS C AB TEST: CPT

## 2021-07-28 PROCEDURE — 84443 ASSAY THYROID STIM HORMONE: CPT

## 2021-07-28 RX ORDER — VITAMIN A ACETATE, .BETA.-CAROTENE, ASCORBIC ACID, CHOLECALCIFEROL, .ALPHA.-TOCOPHEROL ACETATE, DL-, THIAMINE MONONITRATE, RIBOFLAVIN, NIACINAMIDE, PYRIDOXINE HYDROCHLORIDE, FOLIC ACID, CYANOCOBALAMIN, CALCIUM CARBONATE, FERROUS FUMARATE, ZINC OXIDE, AND CUPRIC OXIDE 2000; 2000; 120; 400; 22; 1.84; 3; 20; 10; 1; 12; 200; 27; 25; 2 [IU]/1; [IU]/1; MG/1; [IU]/1; MG/1; MG/1; MG/1; MG/1; MG/1; MG/1; UG/1; MG/1; MG/1; MG/1; MG/1
1 TABLET ORAL DAILY
COMMUNITY
End: 2022-08-18

## 2021-07-28 NOTE — PROGRESS NOTES
NPN nurse visit done over the phone. Pt will be given NPN folder and book at her upcoming appt.   Discussed optional screening available to assess chromosomal anomalies. Questions answered. Pt advised to call the clinic if she has any questions or concerns related to her pregnancy. Prenatal labs will be obtained at her upcoming appt. New prenatal visit scheduled on 8/11/21 with Evelia Ramos CNM.    8w1d    No results found for: PAP        Patient supplied answers from flow sheet for:  Prenatal OB Questionnaire.  Past Medical History  Have you ever recieved care for your mental health? : No  Have you ever been in a major accident or suffered serious trauma?: No  Within the last year, has anyone hit, slapped, kicked or otherwise hurt you?: No  In the last year, has anyone forced you to have sex when you didn't want to?: No    Past Medical History 2   Have you ever received a blood transfusion?: No  Would you accept a blood transfusion if was medically recommended?: Yes  Does anyone in your home smoke?: No   Is your blood type Rh negative?: No  Have you ever ?: (!) Yes  Have you been hospitalized for a nonsurgical reason excluding normal delivery?: No  Have you ever had an abnormal pap smear?: No    Past Medical History (Continued)  Do you have a history of abnormalities of the uterus?: No  Did your mother take PHUONG or any other hormones when she was pregnant with you?: No  Do you have any other problems we have not asked about which you feel may be important to this pregnancy?: No

## 2021-07-29 ENCOUNTER — DOCUMENTATION ONLY (OUTPATIENT)
Facility: CLINIC | Age: 31
End: 2021-07-29

## 2021-07-29 LAB
BACTERIA UR CULT: NO GROWTH
RUBV IGG SERPL QL IA: 1.82 INDEX
RUBV IGG SERPL QL IA: POSITIVE
T PALLIDUM AB SER QL: NONREACTIVE

## 2021-08-11 ENCOUNTER — PRENATAL OFFICE VISIT (OUTPATIENT)
Dept: OBGYN | Facility: CLINIC | Age: 31
End: 2021-08-11
Payer: COMMERCIAL

## 2021-08-11 VITALS — WEIGHT: 142 LBS | BODY MASS INDEX: 22.92 KG/M2 | SYSTOLIC BLOOD PRESSURE: 104 MMHG | DIASTOLIC BLOOD PRESSURE: 64 MMHG

## 2021-08-11 DIAGNOSIS — Z12.4 SCREENING FOR CERVICAL CANCER: ICD-10-CM

## 2021-08-11 DIAGNOSIS — R39.9 URINARY TRACT INFECTION SYMPTOMS: ICD-10-CM

## 2021-08-11 DIAGNOSIS — O09.90 SUPERVISION OF HIGH RISK PREGNANCY, ANTEPARTUM: Primary | ICD-10-CM

## 2021-08-11 DIAGNOSIS — R79.0 LOW FERRITIN: ICD-10-CM

## 2021-08-11 DIAGNOSIS — E05.90 SUBCLINICAL HYPERTHYROIDISM: ICD-10-CM

## 2021-08-11 DIAGNOSIS — Z87.59 HISTORY OF PRE-ECLAMPSIA: ICD-10-CM

## 2021-08-11 DIAGNOSIS — O09.899 SHORT INTERVAL BETWEEN PREGNANCIES AFFECTING PREGNANCY, ANTEPARTUM: ICD-10-CM

## 2021-08-11 PROCEDURE — G0124 SCREEN C/V THIN LAYER BY MD: HCPCS | Performed by: PATHOLOGY

## 2021-08-11 PROCEDURE — 84550 ASSAY OF BLOOD/URIC ACID: CPT | Performed by: ADVANCED PRACTICE MIDWIFE

## 2021-08-11 PROCEDURE — 82728 ASSAY OF FERRITIN: CPT | Performed by: ADVANCED PRACTICE MIDWIFE

## 2021-08-11 PROCEDURE — 84460 ALANINE AMINO (ALT) (SGPT): CPT | Performed by: ADVANCED PRACTICE MIDWIFE

## 2021-08-11 PROCEDURE — G0145 SCR C/V CYTO,THINLAYER,RESCR: HCPCS | Performed by: ADVANCED PRACTICE MIDWIFE

## 2021-08-11 PROCEDURE — 99207 PR FIRST OB VISIT: CPT | Performed by: ADVANCED PRACTICE MIDWIFE

## 2021-08-11 PROCEDURE — 87591 N.GONORRHOEAE DNA AMP PROB: CPT | Performed by: ADVANCED PRACTICE MIDWIFE

## 2021-08-11 PROCEDURE — 84450 TRANSFERASE (AST) (SGOT): CPT | Performed by: ADVANCED PRACTICE MIDWIFE

## 2021-08-11 PROCEDURE — 82565 ASSAY OF CREATININE: CPT | Performed by: ADVANCED PRACTICE MIDWIFE

## 2021-08-11 PROCEDURE — 87491 CHLMYD TRACH DNA AMP PROBE: CPT | Performed by: ADVANCED PRACTICE MIDWIFE

## 2021-08-11 PROCEDURE — 36415 COLL VENOUS BLD VENIPUNCTURE: CPT | Performed by: ADVANCED PRACTICE MIDWIFE

## 2021-08-11 PROCEDURE — 87624 HPV HI-RISK TYP POOLED RSLT: CPT | Performed by: ADVANCED PRACTICE MIDWIFE

## 2021-08-11 NOTE — PROGRESS NOTES
"PRENATAL VISIT   FIRST OBSTETRICAL EXAM - OB     Assessment / Impression   First prenatal visit at 10w1d  29 yo    History of preeclampsia without severe features    Last pap = 2018 NILM  BMI 22.93  EDPS = 0, \"0\" to #10    Plan:     -IOB labs drawn previously. Today added Pre-E baseline labs and ferritin.    -Pt is not a candidate for drawing lead level per [facility] screening tool.   -Patient is not interested in waterbirth.    -Reviewed prenatal care schedule.   -Optimal nutrition and weight gain discussed. Pregnancy weight gain of 25-35 lbs (BMI 18.5-24.9) encouraged.   -Anticipatory guidance for common pregnancy questions and concerns reviewed.   -Danger s/sx for this trimester reviewed with patient.   -Reviewed genetic screening options with patien and they elect for NIPT testing today.   -Reviewed carrier testing options with patient, patient does not elect for testing or referral to genetic counseling.  -IOB packet given and reviewed with patient.   -CNM services and hospital options reviewed; emergency and scheduling phone numbers given to patient.   -The patient has the following high risk factors for preeclampsia:history of preeclampsia. Therefore, low-dose aspirin will be recommended at next visit..  -The patient has the following moderate risk factors for preeclampsia:none. Because the patient will be disucssed at next visit..  -Antepartum VTE risk factors absent.  -Patient is not at increased risk for overt diabetes, so A1C will not be added to IOB labs today.  -Pt is not a candidate for an antepartum OB consult.    -Return to clinic in 4 weeks or sooner as needed.    Total time: 30 minutes spent on the date of the encounter doing chart review, review of test results, patient visit and documentation.   Evelia Ramos CNM      Subjective:   Daniel Aguilar is a 30 year old  here today for her first obstetrical exam at 10w1d. Here with her partner. The patient reports nausea, but no vomiting, " fatigue and some mild breast tenderness. She has a certain LMP. Patient's last menstrual period was 2021., predicting an expected date of delivery of Estimated Date of Delivery: Mar 8, 2022. Her pregnancy is dated by LMP and confirmed by sono.     Offered GC/CT screening today and patient accepts.  Current symptoms also include: breast tenderness, morning sickness and nausea.     Risk factors: . Pregnancy Risk Factors:Last birth within 1 year    VTE antepartum risk factors (two or more risk factors, or 1 * risk factor, places patient at higher risk): none.   Clinical history/risk factors requiring antepartum OB consult: none.     The patient has the following risk factors for overt diabetes: Not at increased risk, BMI normal (or under 23 for  Americans). Plus the additional risk factor(s) of: No additional risk factors.    Social History:   Education level:    Occupation: [employer]   Partners name: Silviano    ?   OB History    Para Term  AB Living   4 1 1 0 2 1   SAB TAB Ectopic Multiple Live Births   0 0 0 0 1      # Outcome Date GA Lbr Octavio/2nd Weight Sex Delivery Anes PTL Lv   4 Current            3 Term 21 39w3d 05:00 / 02:13 2.93 kg (6 lb 7.4 oz) F Vag-Spont EPI, IV N HÉCTOR      Name: Jm      Apgar1: 9  Apgar5: 9   2 AB 20 7w0d          1 AB 18 6w0d               History:   Past Medical History:   Diagnosis Date     Anemia during pregnancy in third trimester 1/15/2021    Oral iron started at 28w  Fe transfusions started (last scheduled 2021)  ____Recheck at 36 weeks     Depressive disorder     anxiety-well managed, no meds     Mild preeclampsia delivered 3/26/2021      Past Surgical History:   Procedure Laterality Date     D & C  2018    missed AB     SEPTOPLASTY  2004     TONSILLECTOMY & ADENOIDECTOMY  2004      Family History   Problem Relation Age of Onset     Colon Cancer Maternal Grandfather      Colon Cancer Maternal Aunt       Social History     Tobacco Use      "Smoking status: Never Smoker     Smokeless tobacco: Never Used   Substance Use Topics     Alcohol use: Not Currently     Drug use: Never      Current Outpatient Medications   Medication Sig Dispense Refill     Prenatal Vit-Fe Fumarate-FA (PNV PRENATAL PLUS MULTIVITAMIN) 27-1 MG TABS per tablet Take 1 tablet by mouth daily        No Known Allergies     The patient's medical, surgical and family histories were reviewed and were pertinent to this visit.     Pap smear: Last Pap: 5/4/2018, Result: NILM, Previous History: normal, Any history of abnormal: no. Next Due: today .     EPDS score today: 0.\"0\" to #10   History of anxiety or depression: no    Review of Systems   General: Fatigue but otherwise denies problem   Eyes: Denies problem   Ears/Nose/Throat: Denies problem   Cardiovascular: Denies problem   Respiratory: Denies problem   Gastrointestinal: Nausea without vomiting, otherwise negative   Genitourinary: Denies any discharge, vaginal bleeding or itchiness or any other problem   Musculoskeletal: Breast tenderness otherwise denies problem   Skin: Denies problem   Neurologic: Denies problem   Psychiatric: Denies problem   Endocrine: Denies problem   Heme/Lymphatic: Denies problem   Allergic/Immunologic: Denies problem         Objective:   Objective  There were no vitals filed for this visit.     Physical Exam:   General Appearance: Alert, cooperative, no distress, appears stated age   HEENT: Normocephalic, without obvious abnormality, atraumatic. Conjunctiva/corneas clear  Neck: Supple, symmetrical  Thyroid: not enlarged, symmetric, no tenderness/mass/nodules   Back: Symmetric, no curvature  Lungs: Clear to auscultation bilaterally, respirations unlabored   Heart: Regular rate and rhythm, S1 and S2 normal, no murmur, rub, or gallop. No edema to lower extremities.   Breasts: deferred   Abdomen: Gravid, soft, non-tender, bowel sounds active all four quadrants, no masses.   FHT:  155  Vulva:  no sign of lesions or " condyloma, normal hair distribution   Vagina: pink, normal rugae, no abnormal discharge   Cervix:  long/thick/closed, no lesions or inflammation noted  Musculoskeletal: Extremities normal, atraumatic, no cyanosis   Skin: Skin color, texture, turgor normal, no rashes or lesions   Neurologic: Alert and oriented x 3. Normal speech   Lab: No results found for any visits on 08/11/21.

## 2021-08-11 NOTE — NURSING NOTE
"Chief Complaint   Patient presents with     Prenatal Care     NPN 10w 1d no concerns       Initial /64 (BP Location: Right arm, Cuff Size: Adult Regular)   Wt 64.4 kg (142 lb)   LMP 2021   Breastfeeding No   BMI 22.92 kg/m   Estimated body mass index is 22.92 kg/m  as calculated from the following:    Height as of 21: 1.676 m (5' 6\").    Weight as of this encounter: 64.4 kg (142 lb).  BP completed using cuff size: regular    Questioned patient about current smoking habits.  Pt. has never smoked.          The following HM Due: pap smear  Chalmydia ()    "

## 2021-08-12 ENCOUNTER — TELEPHONE (OUTPATIENT)
Dept: OBGYN | Facility: CLINIC | Age: 31
End: 2021-08-12

## 2021-08-12 DIAGNOSIS — R39.9 URINARY TRACT INFECTION SYMPTOMS: Primary | ICD-10-CM

## 2021-08-12 LAB
ALBUMIN UR-MCNC: NEGATIVE MG/DL
ALT SERPL W P-5'-P-CCNC: 11 U/L (ref 0–50)
APPEARANCE UR: CLEAR
AST SERPL W P-5'-P-CCNC: 6 U/L (ref 0–45)
BACTERIA #/AREA URNS HPF: ABNORMAL /HPF
BILIRUB UR QL STRIP: NEGATIVE
C TRACH DNA SPEC QL NAA+PROBE: NEGATIVE
COLOR UR AUTO: YELLOW
CREAT SERPL-MCNC: 0.66 MG/DL (ref 0.52–1.04)
CREAT UR-MCNC: 9 MG/DL
FERRITIN SERPL-MCNC: 47 NG/ML (ref 12–150)
GFR SERPL CREATININE-BSD FRML MDRD: >90 ML/MIN/1.73M2
GLUCOSE UR STRIP-MCNC: NEGATIVE MG/DL
HGB UR QL STRIP: ABNORMAL
KETONES UR STRIP-MCNC: NEGATIVE MG/DL
LEUKOCYTE ESTERASE UR QL STRIP: ABNORMAL
N GONORRHOEA DNA SPEC QL NAA+PROBE: NEGATIVE
NITRATE UR QL: NEGATIVE
PH UR STRIP: 6 [PH] (ref 5–7)
PROT UR-MCNC: <0.05 G/L
PROT/CREAT 24H UR: NORMAL MG/G{CREAT}
RBC #/AREA URNS AUTO: ABNORMAL /HPF
SP GR UR STRIP: <=1.005 (ref 1–1.03)
SQUAMOUS #/AREA URNS AUTO: ABNORMAL /LPF
URATE SERPL-MCNC: 3.7 MG/DL (ref 2.6–6)
UROBILINOGEN UR STRIP-ACNC: 0.2 E.U./DL
WBC #/AREA URNS AUTO: ABNORMAL /HPF

## 2021-08-12 PROCEDURE — 81001 URINALYSIS AUTO W/SCOPE: CPT | Performed by: ADVANCED PRACTICE MIDWIFE

## 2021-08-12 PROCEDURE — 84156 ASSAY OF PROTEIN URINE: CPT | Performed by: ADVANCED PRACTICE MIDWIFE

## 2021-08-12 RX ORDER — CEPHALEXIN 500 MG/1
500 CAPSULE ORAL 2 TIMES DAILY
Qty: 10 CAPSULE | Refills: 0 | Status: SHIPPED | OUTPATIENT
Start: 2021-08-12 | End: 2021-08-17

## 2021-08-12 NOTE — TELEPHONE ENCOUNTER
----- Message from SWAPNIL Nye CNM sent at 8/12/2021  3:03 PM CDT -----  Hello!    Stuart was supposed to leave a UA / UC today. It does not appear to be running (just a protein urine). Can you please call lab to see if they can run in on the urine they have. If they can't, please call Stuart back and help her plan to leave a urine in the AM. Abx ordered to [facility] pharmacy.    SWAPNIL Moore, AMELIE

## 2021-08-12 NOTE — TELEPHONE ENCOUNTER
Patient calling:  10w2d  Had appt 8/11/21.  Symptoms have gotten worse since appt.  C/o pain with urination, frequency, urgency.  Symptoms started yesterday.  No fever.  Has urine cup/wipes from appt yesterday. (wasn't able to go)  Will drop off at lab today.  Pharmacy selected.  Kelly Verdugo RN

## 2021-08-12 NOTE — TELEPHONE ENCOUNTER
Called Stuart to discuss-- plan to treat based on symptoms & history. Will check on running UA / UC with triage nurses.    SWAPNIL Moore, CNM

## 2021-08-16 LAB
BKR LAB AP GYN ADEQUACY: ABNORMAL
BKR LAB AP GYN INTERPRETATION: ABNORMAL
BKR LAB AP HPV REFLEX: ABNORMAL
BKR LAB AP PREVIOUS ABNORMAL: ABNORMAL
PATH REPORT.COMMENTS IMP SPEC: ABNORMAL
PATH REPORT.RELEVANT HX SPEC: ABNORMAL

## 2021-08-18 ENCOUNTER — PATIENT OUTREACH (OUTPATIENT)
Dept: OBGYN | Facility: CLINIC | Age: 31
End: 2021-08-18

## 2021-08-18 LAB
HUMAN PAPILLOMA VIRUS 16 DNA: NEGATIVE
HUMAN PAPILLOMA VIRUS 18 DNA: NEGATIVE
HUMAN PAPILLOMA VIRUS FINAL DIAGNOSIS: NORMAL
HUMAN PAPILLOMA VIRUS OTHER HR: NEGATIVE

## 2021-08-23 ENCOUNTER — TELEPHONE (OUTPATIENT)
Dept: OBGYN | Facility: CLINIC | Age: 31
End: 2021-08-23

## 2021-08-23 LAB — SCANNED LAB RESULT: NORMAL

## 2021-08-23 NOTE — TELEPHONE ENCOUNTER
Patient calling for [employer] results.    Results in epic.  Negative: gender revealed per pt request.  Girl.  Kelly Verdugo RN

## 2021-09-10 ENCOUNTER — NURSE TRIAGE (OUTPATIENT)
Dept: NURSING | Facility: CLINIC | Age: 31
End: 2021-09-10

## 2021-09-10 NOTE — TELEPHONE ENCOUNTER
Daniel calls and says that she is 14 weeks pregnant and has a severe headache. Head pain = 8/10. Pt. Says that her Tylenol did not help her headache. Pt. Says that her  will take her to an  clinic this aracelis. COVID 19 Nurse Triage Plan/Patient Instructions    Please be aware that novel coronavirus (COVID-19) may be circulating in the community. If you develop symptoms such as fever, cough, or SOB or if you have concerns about the presence of another infection including coronavirus (COVID-19), please contact your health care provider or visit [email or web address].     Disposition/Instructions    In-Person Visit with provider recommended. Reference Visit Selection Guide.    Thank you for taking steps to prevent the spread of this virus.  o Limit your contact with others.  o Wear a simple mask to cover your cough.  o Wash your hands well and often.    Resources    [facility]: About COVID-19: [email or web address]    CDC: What to Do If You're Sick: www.cdc.gov/coronavirus/2019-ncov/about/steps-when-sick.html    CDC: Ending Home Isolation: www.cdc.gov/coronavirus/2019-ncov/hcp/disposition-in-home-patients.html     CDC: Caring for Someone: www.cdc.gov/coronavirus/2019-ncov/if-you-are-sick/care-for-someone.html     [facility]: Interim Guidance for Hospital Discharge to Home: www.health.[facility].mn.us/diseases/coronavirus/hcp/hospdischarge.pdf    [facility] clinical trials (COVID-19 research studies): [email or web address].[email or web address]-clinical-trials     Below are the COVID-19 hotlines at the [place] Department of Health ([facility]). Interpreters are available.   o For health questions: Call 311-046-5246 or 1-994.914.6329 (7 a.m. to 7 p.m.)  o For questions about schools and childcare: Call 386-947-5099 or 1-884.675.3915 (7 a.m. to 7 p.m.)                   Reason for Disposition    [1] SEVERE headache (e.g., excruciating) AND [2] not improved after 2 hours of pain medicine (Exception: similar to  "previous migraines)    Additional Information    Negative: Difficult to awaken or acting confused (e.g., disoriented, slurred speech)    Negative: [1] Weakness of the face, arm or leg on one side of the body AND [2] new onset    Negative: [1] Numbness of the face, arm or leg on one side of the body AND [2] new onset    Negative: [1] Loss of speech or garbled speech AND [2] new onset    Negative: Sounds like a life-threatening emergency to the triager    Negative: Followed a head injury within last 3 days    Negative: Traumatic Brain Injury (TBI) is suspected    Negative: Sinus pain of forehead and yellow or green nasal discharge    Negative: Unable to walk, or can only walk with assistance (e.g., requires support)    Negative: Stiff neck (can't touch chin to chest)    Negative: Severe pain in one eye    Negative: [1] Other family members (or roommates) with headaches AND [2] possibility of carbon monoxide exposure    Negative: [1] SEVERE headache (e.g., excruciating) AND [2] having contractions or other symptoms of labor    Negative: [1] Pregnant > 20 weeks AND [2] Systolic BP >= 140 OR Diastolic BP >= 90    Negative: [1] SEVERE headache (e.g., excruciating) AND [2] \"worst headache\" of life    Negative: [1] SEVERE headache AND [2] sudden-onset (i.e., reaching maximum intensity within seconds)    Negative: [1] SEVERE headache AND [2] fever    Negative: Loss of vision or double vision (Exception: similar to previous migraines)    Negative: [1] Fever > 100.0 F (37.8 C) AND [2] diabetes mellitus or weak immune system (e.g., HIV positive, cancer chemo, splenectomy, organ transplant, chronic steroids)    Negative: Patient sounds very sick or weak to the triager    Negative: [1] Pregnant > 20 weeks AND [2] SEVERE headache (e.g., excruciating) AND [3] not improved after 2 hours of pain medicine    Negative: [1] Pregnant > 20 weeks AND [2] new hand or face swelling    Negative: [1] Pregnant > 20 weeks AND [2] sudden weight " gain (i.e., more than 3 lbs or 1.4 kg in one week)    Negative: [1] Pregnant > 20 weeks AND [2] new blurred vision or vision changes    Negative: [1] Pregnant 23 or more weeks AND [2] baby is moving less today (e.g., kick count < 5 in 1 hour or < 10 in 2 hours)    Protocols used: PREGNANCY - HEADACHE-A-AH

## 2021-09-15 ENCOUNTER — PRENATAL OFFICE VISIT (OUTPATIENT)
Dept: OBGYN | Facility: CLINIC | Age: 31
End: 2021-09-15
Payer: COMMERCIAL

## 2021-09-15 VITALS — BODY MASS INDEX: 22.92 KG/M2 | DIASTOLIC BLOOD PRESSURE: 64 MMHG | SYSTOLIC BLOOD PRESSURE: 108 MMHG | WEIGHT: 142 LBS

## 2021-09-15 DIAGNOSIS — O09.90 SUPERVISION OF HIGH RISK PREGNANCY, ANTEPARTUM: Primary | ICD-10-CM

## 2021-09-15 DIAGNOSIS — E05.90 SUBCLINICAL HYPERTHYROIDISM: ICD-10-CM

## 2021-09-15 DIAGNOSIS — O23.40 URINARY TRACT INFECTION IN MOTHER DURING PREGNANCY, ANTEPARTUM: ICD-10-CM

## 2021-09-15 DIAGNOSIS — Z87.59 HISTORY OF PRE-ECLAMPSIA: ICD-10-CM

## 2021-09-15 LAB
ALBUMIN UR-MCNC: NEGATIVE MG/DL
APPEARANCE UR: CLEAR
BACTERIA #/AREA URNS HPF: ABNORMAL /HPF
BILIRUB UR QL STRIP: NEGATIVE
COLOR UR AUTO: YELLOW
GLUCOSE UR STRIP-MCNC: NEGATIVE MG/DL
HGB UR QL STRIP: NEGATIVE
KETONES UR STRIP-MCNC: NEGATIVE MG/DL
LEUKOCYTE ESTERASE UR QL STRIP: ABNORMAL
NITRATE UR QL: NEGATIVE
PH UR STRIP: 6.5 [PH] (ref 5–7)
RBC #/AREA URNS AUTO: ABNORMAL /HPF
SP GR UR STRIP: 1.01 (ref 1–1.03)
UROBILINOGEN UR STRIP-ACNC: 0.2 E.U./DL
WBC #/AREA URNS AUTO: ABNORMAL /HPF

## 2021-09-15 PROCEDURE — 81001 URINALYSIS AUTO W/SCOPE: CPT | Performed by: ADVANCED PRACTICE MIDWIFE

## 2021-09-15 PROCEDURE — 99213 OFFICE O/P EST LOW 20 MIN: CPT | Performed by: ADVANCED PRACTICE MIDWIFE

## 2021-09-15 PROCEDURE — 99207 PR PRENATAL VISIT: CPT | Performed by: ADVANCED PRACTICE MIDWIFE

## 2021-09-15 PROCEDURE — 87086 URINE CULTURE/COLONY COUNT: CPT | Performed by: ADVANCED PRACTICE MIDWIFE

## 2021-09-15 RX ORDER — NITROFURANTOIN 25; 75 MG/1; MG/1
100 CAPSULE ORAL 2 TIMES DAILY
Qty: 14 CAPSULE | Refills: 0 | Status: SHIPPED | OUTPATIENT
Start: 2021-09-15 | End: 2021-09-22

## 2021-09-15 NOTE — NURSING NOTE
"Chief Complaint   Patient presents with     Prenatal Care       Initial /64 (BP Location: Left arm, Cuff Size: Adult Regular)   Wt 64.4 kg (142 lb)   LMP 2021   Breastfeeding No   BMI 22.92 kg/m   Estimated body mass index is 22.92 kg/m  as calculated from the following:    Height as of 21: 1.676 m (5' 6\").    Weight as of this encounter: 64.4 kg (142 lb).  BP completed using cuff size: regular    Questioned patient about current smoking habits.  Pt. has never smoked.               "

## 2021-09-15 NOTE — PATIENT INSTRUCTIONS
Weeks 14 thru 16 - Gestational Age (Fetal age - Weeks 12 thru 14):  The fetus s skin is thin and see-through. Fine hair called lanugo begins to form on the head. The fetus begins sucking and swallows bits of amniotic fluid. Fingerprints which individualize each human being have now begun to develop on the tiny fingers of the fetus. Meconium is made in the intestinal tract and will build up to be the baby s first bowel movement. Flutters may be felt in the mom s growing abdomen, as the fetus begins to move around more. Sweat glands have developed, and the liver and pancreas produce fluid secretions. The fetus has reached 6 inches in length and weighs about 4 ounces    Weeks 17 thru 20 - Gestational Age (Fetal Age - Weeks 15 thru 18):  The baby has reached a point where movements are being felt more often by the mother. The eyebrows and eyelashes grow in, and tiny nails have begun to grow on the fingers and toes. The skin of the fetus is going through many changes and begins to produce vernix at the twentieth week. Vernix is a white pasty substance that covers the fetus  skin to protect it from amniotic fluid. Your baby can now hear your voices and music.  A fetal heartbeat can be heard by a stethoscope now. The fetus has reached a length of 8 inches and weighs about 12 ounces.

## 2021-09-15 NOTE — PROGRESS NOTES
"S: Endorses intense headaches a couple times each week. One-sided at onset and then progresses to front. Has a history of migraines prior to pregnancy, though did not have any issues with her first pregnancy. States headaches don't feel like her typical migraine, just a \"really strong headache\". Tylenol takes the edge off, but neither this nor caffeine provide optimal relief.    She is not having pelvic pain, vaginal bleeding, or LOF. She is not feeling fetal movement yet.    O:  /64 (BP Location: Left arm, Cuff Size: Adult Regular)   Wt 64.4 kg (142 lb)   LMP 06/01/2021   Breastfeeding No   BMI 22.92 kg/m    Exam:  Constitutional: Healthy, alert and no distress  Respiratory: Respirations even and unlabored  Gastrointestinal: Abdomen soft, non-tender. Fundus measures appropriately for gestational age. Fetal heart tones heard easily.  Psychiatric: Mentation appears normal and affect normal/bright      A/P:   (O09.90) Supervision of high risk pregnancy, antepartum  (primary encounter diagnosis)  Plan: US OB > 14 Weeks  - Reviewed negative NIPT  - Discussed headache management/prevention. Encouraged extra strength Tylenol dosing every 6 hours as needed. Instructed to not exceed 3000 mg in 24 hours. May also take 300-600 mg magnesium daily supplement for prevention. Encouraged increased water intake.    (O23.40) Urinary tract infection in mother during first trimester of pregnancy  Plan: UA with Microscopic reflex to Culture - Clinic         Collect, Urine Culture Aerobic Bacterial, Urine        Microscopic, Urine Culture, nitroFURantoin         macrocrystal-monohydrate (MACROBID) 100 MG         capsule  - hx recurrent UTI in previous pregnancy  - treated with Keflex bid x5 days for UTI dx'd at 10wks  - Plan Macrobid bid x7 days pending culture  - Daily probiotic encouraged to prevent secondary yeast infection  - Repeat UA/UC at next prenatal visit. Consider urology consult if infection persists.    (Z87.59) " History of pre-eclampsia  - Baseline labs at [facility] normal  - Instructed to begin daily baby ASA for preE prevention    (E05.90) Subclinical hyperthyroidism  - Endocrine consult scheduled in November  - Recheck TSH/T4 at 20 weeks and 28 weeks or as otherwise suggested by endocrine.      Encouraged patient to call with questions or concerns.  Return to clinic in 4 weeks      Sirisha Salvador DNP, APRN, CNM

## 2021-09-16 LAB — BACTERIA UR CULT: NORMAL

## 2021-10-03 ENCOUNTER — NURSE TRIAGE (OUTPATIENT)
Dept: NURSING | Facility: CLINIC | Age: 31
End: 2021-10-03

## 2021-10-04 ENCOUNTER — PRENATAL OFFICE VISIT (OUTPATIENT)
Dept: OBGYN | Facility: CLINIC | Age: 31
End: 2021-10-04
Payer: COMMERCIAL

## 2021-10-04 VITALS — WEIGHT: 144 LBS | DIASTOLIC BLOOD PRESSURE: 78 MMHG | SYSTOLIC BLOOD PRESSURE: 122 MMHG | BODY MASS INDEX: 23.24 KG/M2

## 2021-10-04 DIAGNOSIS — Z87.440 PERSONAL HISTORY OF URINARY TRACT INFECTION: ICD-10-CM

## 2021-10-04 DIAGNOSIS — O09.90 SUPERVISION OF HIGH RISK PREGNANCY, ANTEPARTUM: ICD-10-CM

## 2021-10-04 DIAGNOSIS — O46.92 VAGINAL BLEEDING IN PREGNANCY, SECOND TRIMESTER: Primary | ICD-10-CM

## 2021-10-04 LAB
ALBUMIN UR-MCNC: NEGATIVE MG/DL
APPEARANCE UR: CLEAR
BACTERIA #/AREA URNS HPF: ABNORMAL /HPF
BILIRUB UR QL STRIP: NEGATIVE
COLOR UR AUTO: YELLOW
GLUCOSE UR STRIP-MCNC: NEGATIVE MG/DL
HGB UR QL STRIP: NEGATIVE
KETONES UR STRIP-MCNC: NEGATIVE MG/DL
LEUKOCYTE ESTERASE UR QL STRIP: ABNORMAL
NITRATE UR QL: NEGATIVE
PH UR STRIP: 6.5 [PH] (ref 5–7)
RBC #/AREA URNS AUTO: ABNORMAL /HPF
SP GR UR STRIP: <=1.005 (ref 1–1.03)
UROBILINOGEN UR STRIP-ACNC: 0.2 E.U./DL
WBC #/AREA URNS AUTO: ABNORMAL /HPF

## 2021-10-04 PROCEDURE — 81001 URINALYSIS AUTO W/SCOPE: CPT | Performed by: OBSTETRICS & GYNECOLOGY

## 2021-10-04 PROCEDURE — 87086 URINE CULTURE/COLONY COUNT: CPT | Performed by: OBSTETRICS & GYNECOLOGY

## 2021-10-04 PROCEDURE — 99213 OFFICE O/P EST LOW 20 MIN: CPT | Performed by: OBSTETRICS & GYNECOLOGY

## 2021-10-04 NOTE — PROGRESS NOTES
Problem visit:  Pt is CNM Pt and had episode last night of VB, red like a light menses after a bath, no cramps, no abnl discharge beforehand, no recent intercourse.  No dysuria.  VB stopped after 30 min and has not resumed as of today.  Fetal movement is noted.  Of note, Pt was Rx'd twice during pregnancy thus far for presumed UTIs with UAs showing WBCs but no RBCs.  Pt was Rx'd Keflex 8/12, and Macrobid 9/15.  Ur C&S 2nd time did not grow any organisms, just vag brianne.  Pt does have Hx renal stones, however had neg renal U/S 2/4/2021.  FHTs 154 today, abd non-tender.  Pelvic - EGBUS WNL w/o blood, Vagina - normal physiologic white discharge, no visible blood seen, Cx visibly closed and no blood noted.  Check UA w/ C&S today to r/o UTI/hematuria. Hold off Abx unless Cx grows organism.  Pelvic rest until after U/S in 2 weeks previously scheduled.  RTC 9 days w/ CNM as scheduled.    Encounter Diagnoses   Name Primary?     Vaginal bleeding in pregnancy, second trimester Yes     Personal history of urinary tract infection      Supervision of high risk pregnancy, antepartum        Risk factors listed above are stable and being addressed as noted.    Angel Adair MD  [facility] [place]

## 2021-10-04 NOTE — TELEPHONE ENCOUNTER
OB Triage Call    Reason for call: Patient got out of the shower and noticed that she was bleeding. It was running down her leg. A little bit still bleeding now. At the beginning it was a lot, 30 minutes ago, but not now. No clots, fever, abdominal pain, tissue.     Assessment: Protocol recommends see OB provider within 3 days.     Plan: Care advice given. Patient connected with scheduling for appointment.         17w5d  Estimated Date of Delivery: Mar 8, 2022        OB History    Para Term  AB Living   4 1 1 0 2 1   SAB TAB Ectopic Multiple Live Births   0 0 0 0 1      # Outcome Date GA Lbr Octavio/2nd Weight Sex Delivery Anes PTL Lv   4 Current            3 Term 21 39w3d 05:00 / 02:13 2.93 kg (6 lb 7.4 oz) F Vag-Spont EPI, IV N HÉCTOR      Name: Jm      Apgar1: 9  Apgar5: 9   2 AB 20 7w0d          1 AB 18 6w0d              Lab Results   Component Value Date    GBS Negative 2021          Amara Whitfield RN 10/03/21 8:22 PM  [facility] Nurse Advisor    Reason for Disposition    MILD vaginal bleeding (i.e., less than 1 pad / hour; less than patient's usual menstrual bleeding; not just spotting)    Additional Information    Negative: Shock suspected (e.g., cold/pale/clammy skin, too weak to stand, low BP, rapid pulse)    Negative: Difficult to awaken or acting confused (e.g., disoriented, slurred speech)    Negative: Passed out (i.e., lost consciousness, collapsed and was not responding)    Negative: Sounds like a life-threatening emergency to the triager    Negative: [1] Vaginal bleeding AND [2] pregnant > 20 weeks    Negative: Not pregnant or pregnancy status unknown    Negative: SEVERE abdominal pain    Negative: [1] SEVERE vaginal bleeding (e.g., soaking 2 pads / hour, large blood clots) AND [2] present 2 or more hours    Negative: SEVERE dizziness (e.g., unable to stand, requires support to walk, feels like passing out)    Negative: [1] MODERATE vaginal bleeding  "(i.e., soaking 1 pad / hour; clots) AND [2] present > 6 hours    Negative: [1] MODERATE vaginal bleeding (i.e., soaking 1 pad / hour; clots) AND [2] pregnant > 12 weeks    Negative: Passed tissue (e.g., gray-white)    Negative: Shoulder pain    Negative: Pale skin (pallor) of new onset or worsening    Negative: Patient sounds very sick or weak to the triager    Negative: [1] Constant abdominal pain AND [2] present > 2 hours    Negative: Fever > 100.4 F (38.0 C)    Negative: Prior history of \"ectopic pregnancy\" or previous tubal surgery (e.g., tubal ligation)    Negative: [1] Intermittent lower abdominal pain (e.g., cramping) AND [2] present > 24 hours    Negative: Pain or burning with passing urine (urination)    Negative: MODERATE vaginal bleeding (i.e., soaking 1 pad / hour; clots)    Negative: Has IUD    Protocols used: PREGNANCY - VAGINAL BLEEDING LESS THAN 20 WEEKS EG-A-      "

## 2021-10-05 LAB — BACTERIA UR CULT: NORMAL

## 2021-10-11 ENCOUNTER — HEALTH MAINTENANCE LETTER (OUTPATIENT)
Age: 31
End: 2021-10-11

## 2021-10-13 ENCOUNTER — PRENATAL OFFICE VISIT (OUTPATIENT)
Dept: OBGYN | Facility: CLINIC | Age: 31
End: 2021-10-13
Payer: COMMERCIAL

## 2021-10-13 VITALS — SYSTOLIC BLOOD PRESSURE: 122 MMHG | WEIGHT: 142.9 LBS | DIASTOLIC BLOOD PRESSURE: 74 MMHG | BODY MASS INDEX: 23.06 KG/M2

## 2021-10-13 DIAGNOSIS — Z87.440 PERSONAL HISTORY OF URINARY TRACT INFECTION: Primary | ICD-10-CM

## 2021-10-13 DIAGNOSIS — Z34.92 PRENATAL CARE IN SECOND TRIMESTER: ICD-10-CM

## 2021-10-13 PROCEDURE — 99207 PR PRENATAL VISIT: CPT | Performed by: ADVANCED PRACTICE MIDWIFE

## 2021-10-13 PROCEDURE — 99212 OFFICE O/P EST SF 10 MIN: CPT | Performed by: ADVANCED PRACTICE MIDWIFE

## 2021-10-13 NOTE — PROGRESS NOTES
Feeling well.  Baby is active. Denies any leaking of fluid, vaginal bleeding, regular uterine contractions, or headaches or other concerns.  Came in 10/4 for vaginal bleeding.  Now has resolved.   Reviewed labs together.  Negative urine culture also - had history of UTI.    Has US scheduled.  Declines flu shot today. Usually gets with her .   Is getting along well with , no concerns.    Reviewed to call for contractions, loss of fluid, vaginal bleeding, decreased fetal movement or any other questions or concerns.    RTC in 1 weeks.  Stephanie Martínez, SHAINA, APRN, CNM

## 2021-10-13 NOTE — NURSING NOTE
"Chief Complaint   Patient presents with     Prenatal Care       Initial /74 (BP Location: Right arm, Cuff Size: Adult Regular)   Wt 64.8 kg (142 lb 14.4 oz)   LMP 2021   Breastfeeding No   BMI 23.06 kg/m   Estimated body mass index is 23.06 kg/m  as calculated from the following:    Height as of 21: 1.676 m (5' 6\").    Weight as of this encounter: 64.8 kg (142 lb 14.4 oz).  BP completed using cuff size: regular    Questioned patient about current smoking habits.  Pt. has never smoked.               "

## 2021-10-20 ENCOUNTER — ANCILLARY PROCEDURE (OUTPATIENT)
Dept: ULTRASOUND IMAGING | Facility: CLINIC | Age: 31
End: 2021-10-20
Attending: ADVANCED PRACTICE MIDWIFE
Payer: COMMERCIAL

## 2021-10-20 DIAGNOSIS — O09.90 SUPERVISION OF HIGH RISK PREGNANCY, ANTEPARTUM: ICD-10-CM

## 2021-10-20 PROCEDURE — 76805 OB US >/= 14 WKS SNGL FETUS: CPT | Performed by: OBSTETRICS & GYNECOLOGY

## 2021-11-12 ENCOUNTER — PRENATAL OFFICE VISIT (OUTPATIENT)
Dept: OBGYN | Facility: CLINIC | Age: 31
End: 2021-11-12
Payer: COMMERCIAL

## 2021-11-12 VITALS — BODY MASS INDEX: 23.68 KG/M2 | SYSTOLIC BLOOD PRESSURE: 108 MMHG | DIASTOLIC BLOOD PRESSURE: 70 MMHG | WEIGHT: 146.7 LBS

## 2021-11-12 DIAGNOSIS — E05.90 SUBCLINICAL HYPERTHYROIDISM: ICD-10-CM

## 2021-11-12 DIAGNOSIS — R32 URINARY INCONTINENCE, UNSPECIFIED TYPE: ICD-10-CM

## 2021-11-12 DIAGNOSIS — Z34.82 ENCOUNTER FOR SUPERVISION OF OTHER NORMAL PREGNANCY IN SECOND TRIMESTER: Primary | ICD-10-CM

## 2021-11-12 PROCEDURE — 99207 PR PRENATAL VISIT: CPT | Performed by: ADVANCED PRACTICE MIDWIFE

## 2021-11-12 PROCEDURE — 99213 OFFICE O/P EST LOW 20 MIN: CPT | Performed by: ADVANCED PRACTICE MIDWIFE

## 2021-11-12 NOTE — PROGRESS NOTES
S: Feels well,  Baby active.  Denies uterine cramping, vaginal bleeding or leaking of fluid.     Wondering if she could get a referral to pelvic floor PT. States she has been having some issues with urinary incontinence since becoming pregnant again. States she would like to get established with someone, especially since her pregnancies are spaced closely.     O: Vitals: /70 (BP Location: Left arm, Cuff Size: Adult Regular)   Wt 66.5 kg (146 lb 11.2 oz)   LMP 06/01/2021   Breastfeeding No   BMI 23.68 kg/m    BMI= Body mass index is 23.68 kg/m .  Exam:  Constitutional: healthy, alert and no distress  Respiratory: respirations even and unlabored  Gastrointestinal: Abdomen soft, non-tender. Fundus measures appropriate for gestational age. Fetal heart tones hear without difficulty and within normal limits  : Deferred  Psychiatric: mentation appears normal and affect normal/bright  A/P:  1. (Z34.82) Encounter for supervision of other normal pregnancy in second trimester  (primary encounter diagnosis)  Plan: [facility] Referral      2. (R32) Urinary incontinence, unspecified type  Plan: [facility] Referral      3. (E05.90) Subclinical hyperthyroidism  Has appt with endo scheduled 11/23       Discussed GCT/repeat RPR for next visit, handout provided, reminded of longer appointment.  Tdap next visit; reviewed CDC recommendations and partner/family vaccination recommended as well.  Need for Rhogam? No, to be done next visit   Encouraged patient to call with any questions or concerns.  Return to clinic 4 weeks    SWAPNIL Pires, BILLM

## 2021-11-12 NOTE — NURSING NOTE
"Chief Complaint   Patient presents with     Prenatal Care       Initial /70 (BP Location: Left arm, Cuff Size: Adult Regular)   Wt 66.5 kg (146 lb 11.2 oz)   LMP 2021   Breastfeeding No   BMI 23.68 kg/m   Estimated body mass index is 23.68 kg/m  as calculated from the following:    Height as of 21: 1.676 m (5' 6\").    Weight as of this encounter: 66.5 kg (146 lb 11.2 oz).  BP completed using cuff size: regular    Questioned patient about current smoking habits.  Pt. has never smoked.          "

## 2021-11-23 ENCOUNTER — VIRTUAL VISIT (OUTPATIENT)
Dept: ENDOCRINOLOGY | Facility: CLINIC | Age: 31
End: 2021-11-23
Payer: COMMERCIAL

## 2021-11-23 DIAGNOSIS — E05.90 SUBCLINICAL HYPERTHYROIDISM: ICD-10-CM

## 2021-11-23 DIAGNOSIS — O99.281 THYROID DYSFUNCTION IN PREGNANCY IN FIRST TRIMESTER: ICD-10-CM

## 2021-11-23 DIAGNOSIS — E07.9 THYROID DYSFUNCTION IN PREGNANCY IN FIRST TRIMESTER: ICD-10-CM

## 2021-11-23 PROCEDURE — 99203 OFFICE O/P NEW LOW 30 MIN: CPT | Mod: 95 | Performed by: INTERNAL MEDICINE

## 2021-11-23 ASSESSMENT — ENCOUNTER SYMPTOMS
CONSTIPATION: 0
HYPERTENSION: 0
ABDOMINAL PAIN: 0
ORTHOPNEA: 0
BLOOD IN STOOL: 0
LEG PAIN: 0
HEARTBURN: 1
EXERCISE INTOLERANCE: 0
SLEEP DISTURBANCES DUE TO BREATHING: 0
NAUSEA: 0
BOWEL INCONTINENCE: 0
RECTAL PAIN: 0
BLOATING: 0
JAUNDICE: 0
DIARRHEA: 0
VOMITING: 0
LIGHT-HEADEDNESS: 1
SYNCOPE: 0
PALPITATIONS: 0
HYPOTENSION: 0

## 2021-11-23 NOTE — PATIENT INSTRUCTIONS
[facility]  Dr Escalona, Endocrinology Department    [facility]   [address] [facility]  [place] [address]  Appointment [phone]  Fax: [phone]  [place]: Monday - Thursday      Please check the cost coverage and copay with insurance before recommended tests, services and medications (especially if new medications are prescribed).     If ordered, please get blood work done 1 week prior to your next appointment so they will be available to Dr. Escalona at your visit.    Labs needed.  Thyroid ultrasound with radiology.  Further work up based on that.     [facility] radiology scheduleing   [place]  [phone]   [facility] Radiology scheduling  [place]  [phone]     Please call and schedule the recommended test as discussed in clinic visit. These are the numbers to call.

## 2021-11-23 NOTE — PROGRESS NOTES
"THIS IS A VIDEO VISIT:    Phone call visit/virtual visit encounter:    Name of patient: Daniel Steiner    Date of encounter: 11/23/2021    Time of start of video visit: 11:30    Video started: 11:41    Video ended: 11:53    Provider location: working from [place]    Patient location: patients home.    Mode of transmission: video/ Doximity    Verbal consent: obtained before starting visit. Pt is agreeable.      The patient has been notified of following:      \"This VIDEO visit will be conducted via a call between you and your physician/provider. We have found that certain health care needs can be provided without the need for a physical exam.  This service lets us provide the care you need with a short phone conversation.  If a prescription is necessary we can send it directly to your pharmacy.  If lab work is needed we can place an order for that and you can then stop by our lab to have the test done at a later time.     With new updates with corona virus patient might be billed as clinic visit.     If during the course of the call the physician/provider feels a telephone visit is not appropriate, you will not be charged for this service.\"      Past medical history, social history, family history, allergy and medications were reviewed and updated as appropriate.  Reviewed pertinent labs, notes, imaging studies personally.    Name: Daniel Steiner  Seen in consultation with Pebbles Owens APRN CNM for subclinical Hyperthyroidism and Thyroid dysfunction in pregnancy in first trimester.  HPI:  Daniel Steiner is a 31 year old female who presents for the evaluation of Subclinical Hyperthyroidism and Thyroid dysfunction in pregnancy in first trimester.   has a past medical history of Abnormal Pap smear of cervix (2018), Anemia during pregnancy in third trimester (01/15/2021), Depressive disorder, and Mild preeclampsia delivered (03/26/2021).    She is pregnant- about 25 pregnant.  JUSTIN: March 2022.  Has " one child- had similar thyroid dysfunction. She was not seen by endocrinologist at that time and plan was to continue to monitor. She was not on thyroid hormone replacement or antithyroid medication at that time.  She reports that historically her thyroid labs were up and down.  She was getting care at [facility] before. Records from outside reviewed. Typically TSH running on lower side.     Never been on thyroid hormone replacement.  Feeling OK.  Sometimes tired.    History of radiation exposure: NO  Palpitations:  On and off X now more often. She had heart monitor done in past and was OK.  Changes to hair or skin: No  Diarrhea/Constipation:No  Changes in vision:No  Diplopia/Blurryness:No  Dysphagia or Shortness of breath:No  Tremors:No  Difficulty sleeping:No  Changes in weight: gained about 2 lbs in last pregnancy.  Lithium/Amiodarone: No  URI: No  CT Scans:No    PMH/PSH:  Past Medical History:   Diagnosis Date     Abnormal Pap smear of cervix 2018    8/10/21     Anemia during pregnancy in third trimester 01/15/2021    Oral iron started at 28w  Fe transfusions started (last scheduled 2/5/2021)  ____Recheck at 36 weeks     Depressive disorder     anxiety-well managed, no meds     Mild preeclampsia delivered 03/26/2021     Past Surgical History:   Procedure Laterality Date     D & C  09/2018    missed AB     SEPTOPLASTY  2004     TONSILLECTOMY & ADENOIDECTOMY  2004     Family Hx:  Family History   Problem Relation Age of Onset     Colon Cancer Maternal Grandfather      Colon Cancer Maternal Aunt        Social Hx:  Social History     Socioeconomic History     Marital status:      Spouse name: iSlviano     Number of children: 0     Years of education: Not on file     Highest education level: Some college, no degree   Occupational History     Occupation: [employer]   Tobacco Use     Smoking status: Never Smoker     Smokeless tobacco: Never Used   Vaping Use     Vaping Use: Never used   Substance and  Sexual Activity     Alcohol use: Not Currently     Drug use: Never     Sexual activity: Yes     Partners: Male   Other Topics Concern     Not on file   Social History Narrative     Not on file     Social Determinants of Health     Financial Resource Strain: Low Risk      Difficulty of Paying Living Expenses: Not hard at all   Food Insecurity: No Food Insecurity     Worried About Running Out of Food in the Last Year: Never true     Ran Out of Food in the Last Year: Never true   Transportation Needs: No Transportation Needs     Lack of Transportation (Medical): No     Lack of Transportation (Non-Medical): No   Physical Activity: Not on file   Stress: Not on file   Social Connections: Not on file   Intimate Partner Violence: Not on file   Housing Stability: Not on file          MEDICATIONS:  has a current medication list which includes the following prescription(s): pnv prenatal plus multivitamin.    ROS   ROS: 10 point ROS neg other than the symptoms noted above in the HPI.    Physical Exam   VS: LMP 06/01/2021   Breastfeeding No   GENERAL: healthy, alert and no distress  EYES: Eyes grossly normal to inspection, conjunctivae and sclerae normal  ENT: no nose swelling, nasal discharge.  Thyroid: no apparent thyroid nodules  RESP: no audible wheeze, cough, or visible cyanosis.  No visible retractions or increased work of breathing.  Able to speak fully in complete sentences.  ABDO: not evaluated.  EXTREMITIES: no hand tremors.  NEURO: Cranial nerves grossly intact, mentation intact and speech normal  SKIN: No apparent skin lesions, rash or edema seen   PSYCH: mentation appears normal, affect normal/bright, judgement and insight intact, normal speech and appearance well-groomed    LABS:  TFTs:  ENDO THYROID LABS-P Latest Ref Rng & Units 7/28/2021 4/23/2021   TSH 0.40 - 4.00 mU/L 0.08 (L) 0.37 (L)   FREE T3 2.3 - 4.2 pg/mL     FREE T4 0.76 - 1.46 ng/dL 1.15 0.92       CBC:  Lab Results   Component Value Date    WBC 6.5  07/28/2021    WBC 6.3 04/23/2021     Lab Results   Component Value Date    RBC 4.18 07/28/2021    RBC 4.01 04/23/2021     Lab Results   Component Value Date    HGB 12.6 07/28/2021    HGB 12.0 04/23/2021     Lab Results   Component Value Date    HCT 37.5 07/28/2021    HCT 36.8 04/23/2021     No components found for: MCT  Lab Results   Component Value Date    MCV 90 07/28/2021    MCV 92 04/23/2021     Lab Results   Component Value Date    MCH 30.1 07/28/2021    MCH 29.9 04/23/2021     Lab Results   Component Value Date    MCHC 33.6 07/28/2021    MCHC 32.6 04/23/2021     Lab Results   Component Value Date    RDW 13.3 07/28/2021    RDW 12.6 04/23/2021     Lab Results   Component Value Date     07/28/2021     04/23/2021         LFTs:  Liver Function Studies -   Recent Labs   Lab Test 08/11/21  0921   AST 6   ALT 11         All pertinent notes, labs, and images personally reviewed by me.     A/P  Ms.Andrea Steiner is a 31 year old here for the evaluation of hyperthyroidism.  Subclinical hyperthyroidism:  Differential for hyperthyroidism includes: hCG induced hyperthyroidism, Graves' disease, thyroiditis, or autonomous hyperfunctioning nodule.   Currently she is 25 weeks pregnant with JUSTIN in March 2022.  She had similar thyroid labs with first pregnancy. Plan was to continue to monitor at that time.  Historically noted to have low TSH.  Clinically looks euthyroid.  Plan:  Discussed diagnosis, pathophysiology, management and treatment options of condition with pt.  Discussed pregnancy related thyroid changes.  Plan to get baseline labs as noted below as well as screen for Graves' disease.  We will get baseline thyroid ultrasound.  Follow-up based on that.  Discussed antithyroid medication indications and risks associated with that during pregnancy.    (Plan: T3 Free, T4 free - FUTURE S+90, Thyroid         stimulating immunoglobulin, TSH, US Thyroid          Thyroid-stimulating immunoglobulins (TSI) can be  detected in the majority of patients (77.8%) with Graves  disease.  It can be used to predict relapse or remission when using PTU/methimazole or radioiodine.  These assays have also been advocated for use in patients with subclinical Graves  hyperthyroidism or patients with unilateral ophthalmopathy.    Available records, labs and images from outside clinic were personally reviewed.    All questions were answered.  The patient indicates understanding of the above issues and agrees with the plan set forth.      Follow-up:  Based on labs.    Maria Elena Escalona MD  Endocrinology  [facility]  CC: No Ref-Primary, Physician       All questions were answered.  The patient indicates understanding of the above issues and agrees with the plan set forth.

## 2021-11-23 NOTE — Clinical Note
"    11/23/2021         RE: Daniel Steiner  [address]  [facility] [address]        Dear Colleague,    Thank you for referring your patient, Daniel Steiner, to the [facility]. Please see a copy of my visit note below.    THIS IS A VIDEO VISIT:    Phone call visit/virtual visit encounter:    Name of patient: Daniel Steiner    Date of encounter: 11/23/2021    Time of start of video visit: 11:30    Video started: 11:41    Video ended: 11:53    Provider location: working from home/ [facility]    Patient location: patients home.    Mode of transmission: video/ Doximity    Verbal consent: obtained before starting visit. Pt is agreeable.      The patient has been notified of following:      \"This VIDEO visit will be conducted via a call between you and your physician/provider. We have found that certain health care needs can be provided without the need for a physical exam.  This service lets us provide the care you need with a short phone conversation.  If a prescription is necessary we can send it directly to your pharmacy.  If lab work is needed we can place an order for that and you can then stop by our lab to have the test done at a later time.     With new updates with corona virus patient might be billed as clinic visit.     If during the course of the call the physician/provider feels a telephone visit is not appropriate, you will not be charged for this service.\"      Past medical history, social history, family history, allergy and medications were reviewed and updated as appropriate.  Reviewed pertinent labs, notes, imaging studies personally.    Name: Daniel Steiner  Seen in consultation with Pebbles Owens APRN CNM for subclinical Hyperthyroidism and Thyroid dysfunction in pregnancy in first trimester.  HPI:  Daniel Steiner is a 31 year old female who presents for the evaluation of Subclinical Hyperthyroidism and Thyroid dysfunction in pregnancy in first trimester.   " has a past medical history of Abnormal Pap smear of cervix (2018), Anemia during pregnancy in third trimester (01/15/2021), Depressive disorder, and Mild preeclampsia delivered (03/26/2021).    She is pregnant- about 25 pregnant.  JUSTIN: March 2022.  Has one child- had similar thyroid dysfunction. She was not seen by endocrinologist at that time and plan was to continue to monitor. She was not on thyroid hormone replacement or antithyroid medication at that time.  She reports that historically her thyroid labs were up and down.  She was getting care at [facility] before. Records from outside reviewed. Typically TSH running on lower side.     Never been on thyroid hormone replacement.  Feeling OK.  Sometimes tired.    History of radiation exposure: NO  Palpitations:  On and off X now more often. She had heart monitor done in past and was OK.  Changes to hair or skin: No  Diarrhea/Constipation:No  Changes in vision:No  Diplopia/Blurryness:No  Dysphagia or Shortness of breath:No  Tremors:No  Difficulty sleeping:No  Changes in weight: gained about 2 lbs in last pregnancy.  Lithium/Amiodarone: No  URI: No  CT Scans:No    PMH/PSH:  Past Medical History:   Diagnosis Date     Abnormal Pap smear of cervix 2018    8/10/21     Anemia during pregnancy in third trimester 01/15/2021    Oral iron started at 28w  Fe transfusions started (last scheduled 2/5/2021)  ____Recheck at 36 weeks     Depressive disorder     anxiety-well managed, no meds     Mild preeclampsia delivered 03/26/2021     Past Surgical History:   Procedure Laterality Date     D & C  09/2018    missed AB     SEPTOPLASTY  2004     TONSILLECTOMY & ADENOIDECTOMY  2004     Family Hx:  Family History   Problem Relation Age of Onset     Colon Cancer Maternal Grandfather      Colon Cancer Maternal Aunt        Social Hx:  Social History     Socioeconomic History     Marital status:      Spouse name: Silviano     Number of children: 0     Years of education: Not on  file     Highest education level: Some college, no degree   Occupational History     Occupation: [employer]   Tobacco Use     Smoking status: Never Smoker     Smokeless tobacco: Never Used   Vaping Use     Vaping Use: Never used   Substance and Sexual Activity     Alcohol use: Not Currently     Drug use: Never     Sexual activity: Yes     Partners: Male   Other Topics Concern     Not on file   Social History Narrative     Not on file     Social Determinants of Health     Financial Resource Strain: Low Risk      Difficulty of Paying Living Expenses: Not hard at all   Food Insecurity: No Food Insecurity     Worried About Running Out of Food in the Last Year: Never true     Ran Out of Food in the Last Year: Never true   Transportation Needs: No Transportation Needs     Lack of Transportation (Medical): No     Lack of Transportation (Non-Medical): No   Physical Activity: Not on file   Stress: Not on file   Social Connections: Not on file   Intimate Partner Violence: Not on file   Housing Stability: Not on file          MEDICATIONS:  has a current medication list which includes the following prescription(s): pnv prenatal plus multivitamin.    ROS   ROS: 10 point ROS neg other than the symptoms noted above in the HPI.    Physical Exam   VS: LMP 06/01/2021   Breastfeeding No   GENERAL: healthy, alert and no distress  EYES: Eyes grossly normal to inspection, conjunctivae and sclerae normal  ENT: no nose swelling, nasal discharge.  Thyroid: no apparent thyroid nodules  RESP: no audible wheeze, cough, or visible cyanosis.  No visible retractions or increased work of breathing.  Able to speak fully in complete sentences.  ABDO: not evaluated.  EXTREMITIES: no hand tremors.  NEURO: Cranial nerves grossly intact, mentation intact and speech normal  SKIN: No apparent skin lesions, rash or edema seen   PSYCH: mentation appears normal, affect normal/bright, judgement and insight intact, normal speech and appearance  well-groomed    LABS:  TFTs:  ENDO THYROID LABS-[facility] Latest Ref Rng & Units 7/28/2021 4/23/2021   TSH 0.40 - 4.00 mU/L 0.08 (L) 0.37 (L)   FREE T3 2.3 - 4.2 pg/mL     FREE T4 0.76 - 1.46 ng/dL 1.15 0.92       CBC:  Lab Results   Component Value Date    WBC 6.5 07/28/2021    WBC 6.3 04/23/2021     Lab Results   Component Value Date    RBC 4.18 07/28/2021    RBC 4.01 04/23/2021     Lab Results   Component Value Date    HGB 12.6 07/28/2021    HGB 12.0 04/23/2021     Lab Results   Component Value Date    HCT 37.5 07/28/2021    HCT 36.8 04/23/2021     No components found for: MCT  Lab Results   Component Value Date    MCV 90 07/28/2021    MCV 92 04/23/2021     Lab Results   Component Value Date    MCH 30.1 07/28/2021    MCH 29.9 04/23/2021     Lab Results   Component Value Date    MCHC 33.6 07/28/2021    MCHC 32.6 04/23/2021     Lab Results   Component Value Date    RDW 13.3 07/28/2021    RDW 12.6 04/23/2021     Lab Results   Component Value Date     07/28/2021     04/23/2021         LFTs:  Liver Function Studies -   Recent Labs   Lab Test 08/11/21  0921   AST 6   ALT 11         All pertinent notes, labs, and images personally reviewed by me.     A/P  Ms.Andrea Steiner is a 31 year old here for the evaluation of hyperthyroidism.  Subclinical hyperthyroidism:  Differential for hyperthyroidism includes: hCG induced hyperthyroidism, Graves' disease, thyroiditis, or autonomous hyperfunctioning nodule.   Currently she is 25 weeks pregnant with JUSTIN in March 2022.  She had similar thyroid labs with first pregnancy. Plan was to continue to monitor at that time.  Historically noted to have low TSH.  Clinically looks euthyroid.  Plan:  Discussed diagnosis, pathophysiology, management and treatment options of condition with pt.  Discussed pregnancy related thyroid changes.  Plan to get baseline labs as noted below as well as screen for Graves' disease.  We will get baseline thyroid ultrasound.  Follow-up based on  that.  Discussed antithyroid medication indications and risks associated with that during pregnancy.    (Plan: T3 Free, T4 free - FUTURE S+90, Thyroid         stimulating immunoglobulin, TSH, US Thyroid          Thyroid-stimulating immunoglobulins (TSI) can be detected in the majority of patients (77.8%) with Graves  disease.  It can be used to predict relapse or remission when using PTU/methimazole or radioiodine.  These assays have also been advocated for use in patients with subclinical Graves  hyperthyroidism or patients with unilateral ophthalmopathy.    Available records, labs and images from outside clinic were personally reviewed.    All questions were answered.  The patient indicates understanding of the above issues and agrees with the plan set forth.      Follow-up:  Based on labs.    Maria Elena Escalona MD  Endocrinology  [facility]  CC: No Ref-Primary, Physician       All questions were answered.  The patient indicates understanding of the above issues and agrees with the plan set forth.           Again, thank you for allowing me to participate in the care of your patient.        Sincerely,        Maria Elena Escalona MD

## 2021-12-01 ENCOUNTER — LAB (OUTPATIENT)
Dept: LAB | Facility: CLINIC | Age: 31
End: 2021-12-01
Payer: COMMERCIAL

## 2021-12-01 DIAGNOSIS — O99.281 THYROID DYSFUNCTION IN PREGNANCY IN FIRST TRIMESTER: ICD-10-CM

## 2021-12-01 DIAGNOSIS — E05.90 SUBCLINICAL HYPERTHYROIDISM: ICD-10-CM

## 2021-12-01 DIAGNOSIS — E07.9 THYROID DYSFUNCTION IN PREGNANCY IN FIRST TRIMESTER: ICD-10-CM

## 2021-12-01 LAB — T3FREE SERPL-MCNC: 3 PG/ML (ref 2.3–4.2)

## 2021-12-01 PROCEDURE — 84481 FREE ASSAY (FT-3): CPT

## 2021-12-01 PROCEDURE — 84445 ASSAY OF TSI GLOBULIN: CPT | Mod: 90

## 2021-12-01 PROCEDURE — 99000 SPECIMEN HANDLING OFFICE-LAB: CPT

## 2021-12-01 PROCEDURE — 36415 COLL VENOUS BLD VENIPUNCTURE: CPT

## 2021-12-01 PROCEDURE — 84439 ASSAY OF FREE THYROXINE: CPT

## 2021-12-01 PROCEDURE — 84443 ASSAY THYROID STIM HORMONE: CPT

## 2021-12-02 LAB
T4 FREE SERPL-MCNC: 1.12 NG/DL (ref 0.76–1.46)
TSH SERPL DL<=0.005 MIU/L-ACNC: 0.17 MU/L (ref 0.4–4)

## 2021-12-08 LAB — TSI SER-ACNC: <1 TSI INDEX

## 2021-12-14 ENCOUNTER — TELEPHONE (OUTPATIENT)
Dept: ENDOCRINOLOGY | Facility: CLINIC | Age: 31
End: 2021-12-14

## 2021-12-14 NOTE — LETTER
December 22, 2021      Daniel Aguilar  [id number]  [place] [address]        Dear ,    We are writing to inform you of your test results.    Please make an appointment with your provider to review or follow up on your test results, in the next 1-2 weeks.  Appointments can be made by calling [phone].    Resulted Orders   T3 Free   Result Value Ref Range    T3 Free 3.0 2.3 - 4.2 pg/mL   T4 free - FUTURE S+90   Result Value Ref Range    Free T4 1.12 0.76 - 1.46 ng/dL   Thyroid stimulating immunoglobulin   Result Value Ref Range    Thyroid Stim Immunog <1.0 <=1.3 TSI index      Comment:         Test Performed by:  [address] [address]  : Tevin Haque M.D. Ph.D.; CLIA# [id number]   TSH   Result Value Ref Range    TSH 0.17 (L) 0.40 - 4.00 mU/L       If you have any questions or concerns, please call the clinic at the number listed above.       Sincerely,

## 2021-12-14 NOTE — TELEPHONE ENCOUNTER
ENDO THYROID LABS-[facility] Latest Ref Rng & Units 12/1/2021 7/28/2021   TSH 0.40 - 4.00 mU/L 0.17 (L) 0.08 (L)   FREE T3 2.3 - 4.2 pg/mL 3.0    FREE T4 0.76 - 1.46 ng/dL 1.12 1.15   THYROID STIM IMMUNOG <=1.3 TSI index <1.0      Currently she is pregnant.  Labs have improved as compared to July 2021  Screening test for Graves' disease was negative.  Please help schedule virtual visit/clinic visit to discuss labs and next step.  Okay to use MARGARET slot in next 1 to 2 weeks.    Please inform patient and help schedule.

## 2021-12-15 NOTE — TELEPHONE ENCOUNTER
12/15 - lvm x 1 to schedule f/up with dr ahmadi using chart review/MARGARET spot within 1 to 2 weeks

## 2021-12-21 ENCOUNTER — PRENATAL OFFICE VISIT (OUTPATIENT)
Dept: OBGYN | Facility: CLINIC | Age: 31
End: 2021-12-21
Payer: COMMERCIAL

## 2021-12-21 ENCOUNTER — TELEPHONE (OUTPATIENT)
Dept: OBGYN | Facility: CLINIC | Age: 31
End: 2021-12-21

## 2021-12-21 VITALS — DIASTOLIC BLOOD PRESSURE: 60 MMHG | BODY MASS INDEX: 24.37 KG/M2 | SYSTOLIC BLOOD PRESSURE: 104 MMHG | WEIGHT: 151 LBS

## 2021-12-21 DIAGNOSIS — O99.013 ANEMIA IN PREGNANCY, THIRD TRIMESTER: Primary | ICD-10-CM

## 2021-12-21 DIAGNOSIS — O09.90 SUPERVISION OF HIGH RISK PREGNANCY, ANTEPARTUM: Primary | ICD-10-CM

## 2021-12-21 LAB
ERYTHROCYTE [DISTWIDTH] IN BLOOD BY AUTOMATED COUNT: 13.5 % (ref 10–15)
HCT VFR BLD AUTO: 31 % (ref 35–47)
HGB BLD-MCNC: 10.1 G/DL (ref 11.7–15.7)
MCH RBC QN AUTO: 31 PG (ref 26.5–33)
MCHC RBC AUTO-ENTMCNC: 32.6 G/DL (ref 31.5–36.5)
MCV RBC AUTO: 95 FL (ref 78–100)
PLATELET # BLD AUTO: 135 10E3/UL (ref 150–450)
RBC # BLD AUTO: 3.26 10E6/UL (ref 3.8–5.2)
WBC # BLD AUTO: 5 10E3/UL (ref 4–11)

## 2021-12-21 PROCEDURE — 99207 PR PRENATAL VISIT: CPT | Performed by: ADVANCED PRACTICE MIDWIFE

## 2021-12-21 PROCEDURE — 85027 COMPLETE CBC AUTOMATED: CPT | Performed by: ADVANCED PRACTICE MIDWIFE

## 2021-12-21 PROCEDURE — 82950 GLUCOSE TEST: CPT | Performed by: ADVANCED PRACTICE MIDWIFE

## 2021-12-21 PROCEDURE — 90471 IMMUNIZATION ADMIN: CPT | Performed by: ADVANCED PRACTICE MIDWIFE

## 2021-12-21 PROCEDURE — 90715 TDAP VACCINE 7 YRS/> IM: CPT | Performed by: ADVANCED PRACTICE MIDWIFE

## 2021-12-21 PROCEDURE — 36415 COLL VENOUS BLD VENIPUNCTURE: CPT | Performed by: ADVANCED PRACTICE MIDWIFE

## 2021-12-21 PROCEDURE — 86780 TREPONEMA PALLIDUM: CPT | Performed by: ADVANCED PRACTICE MIDWIFE

## 2021-12-21 RX ORDER — FERROUS GLUCONATE 324(38)MG
324 TABLET ORAL
Qty: 90 TABLET | Refills: 3 | Status: SHIPPED | OUTPATIENT
Start: 2021-12-21 | End: 2022-08-18

## 2021-12-21 NOTE — NURSING NOTE
"Chief Complaint   Patient presents with     Prenatal Care     29w GCT and tdao today, c/o low back pain, cloudy urine in morning       Initial /60 (BP Location: Left arm, Cuff Size: Adult Regular)   Wt 68.5 kg (151 lb)   LMP 2021   BMI 24.37 kg/m   Estimated body mass index is 24.37 kg/m  as calculated from the following:    Height as of 21: 1.676 m (5' 6\").    Weight as of this encounter: 68.5 kg (151 lb).  BP completed using cuff size: regular    Questioned patient about current smoking habits.  Pt. has never smoked.          The following HM Due: NONE    "

## 2021-12-21 NOTE — PROGRESS NOTES
S: Feels well overall with some lower back pain related to sitting all day for work, no urinary or vaginal s/sx.  Baby active.  Denies uterine cramping, vaginal bleeding or leaking of fluid  O: Vitals: /60 (BP Location: Left arm, Cuff Size: Adult Regular)   Wt 68.5 kg (151 lb)   LMP 06/01/2021   BMI 24.37 kg/m    BMI= Body mass index is 24.37 kg/m .  Exam:  Constitutional: healthy, alert and no distress  Respiratory: respirations even and unlabored  Gastrointestinal: Abdomen soft, non-tender. Fundus measures appropriate for gestational age. Fetal heart tones hear without difficulty and within normal limits  : Deferred  Psychiatric: mentation appears normal and affect normal/bright  A:     ICD-10-CM    1. Supervision of high risk pregnancy, antepartum  O09.90 Glucose tolerance, gest screen, 1 hour     CBC with platelets     Treponema Abs w Reflex to RPR and Titer     TDAP VACCINE (Adacel, Boostrix)  [[id number]]     P: GCT/repeat RPR today, handout provided.  Tdap given; reviewed CDC recommendations and partner/family vaccination recommended as well.  Need for Rhogam? No.   Discussed patient options for postpartum contraception. Patient is planning vasectomy  Reviewed exercises for back and encourage patient to use maternity belt  Encouraged patient to call with any questions or concerns.  Return to clinic 2 weeks    Completed by Bianca Darling CNM  With Bethany Rea CNM

## 2021-12-21 NOTE — TELEPHONE ENCOUNTER
Informed patient of anemia.  Instructions provided for ferrous gluconate daily with vitamin c.  Will plan to follow up at 32 weeks.    Bianca Darling CNM

## 2021-12-22 LAB
GLUCOSE 1H P 50 G GLC PO SERPL-MCNC: 103 MG/DL (ref 70–129)
T PALLIDUM AB SER QL: NONREACTIVE

## 2022-01-03 ENCOUNTER — TELEPHONE (OUTPATIENT)
Dept: ENDOCRINOLOGY | Facility: CLINIC | Age: 32
End: 2022-01-03

## 2022-01-03 ENCOUNTER — PRENATAL OFFICE VISIT (OUTPATIENT)
Dept: OBGYN | Facility: CLINIC | Age: 32
End: 2022-01-03
Payer: COMMERCIAL

## 2022-01-03 VITALS — SYSTOLIC BLOOD PRESSURE: 106 MMHG | DIASTOLIC BLOOD PRESSURE: 70 MMHG | WEIGHT: 154 LBS | BODY MASS INDEX: 24.86 KG/M2

## 2022-01-03 DIAGNOSIS — O09.90 HIGH-RISK PREGNANCY, UNSPECIFIED TRIMESTER: Primary | ICD-10-CM

## 2022-01-03 DIAGNOSIS — E07.9 DISORDER OF THYROID: ICD-10-CM

## 2022-01-03 DIAGNOSIS — E61.1 IRON DEFICIENCY: ICD-10-CM

## 2022-01-03 DIAGNOSIS — K59.00 CONSTIPATION, UNSPECIFIED CONSTIPATION TYPE: ICD-10-CM

## 2022-01-03 PROCEDURE — 99207 PR PRENATAL VISIT: CPT | Performed by: ADVANCED PRACTICE MIDWIFE

## 2022-01-03 RX ORDER — DOCUSATE SODIUM 100 MG/1
100 CAPSULE, LIQUID FILLED ORAL 2 TIMES DAILY
COMMUNITY
Start: 2022-01-03 | End: 2022-03-18

## 2022-01-03 NOTE — TELEPHONE ENCOUNTER
[facility] Call Center    Phone Message    May a detailed message be left on voicemail: yes     Reason for Call: Other: Patient follow up per [place] message from 12/22/2021 to scheuled with doctor to go over labs  Book first open appointment on 3/21/2022 per patient it was requested she been seen within 1-2 weeks please advise     Action Taken: Other: ENDO    Travel Screening: Not Applicable

## 2022-01-03 NOTE — TELEPHONE ENCOUNTER
Date: 1/17/2022 Status: Scheduled   Time: 10:30 AM Length: 30   Visit Type: RETURN ENDOCRINE [[id number]] Copay: $40.00   Provider: Maria Elena Escalona MD Department: [facility]OCRIN[facility]   Bill Area: Endocrinology [place]

## 2022-01-03 NOTE — NURSING NOTE
"Chief Complaint   Patient presents with     Prenatal Care     30w 6d       Initial /70 (BP Location: Left arm, Cuff Size: Adult Regular)   Wt 69.9 kg (154 lb)   LMP 2021   BMI 24.86 kg/m   Estimated body mass index is 24.86 kg/m  as calculated from the following:    Height as of 21: 1.676 m (5' 6\").    Weight as of this encounter: 69.9 kg (154 lb).  BP completed using cuff size: regular    Questioned patient about current smoking habits.  Pt. has never smoked.          The following HM Due: NONE  "

## 2022-01-03 NOTE — PROGRESS NOTES
S: Feels well overall,  Baby active.  Denies uterine cramping, vaginal bleeding or leaking of fluid.  Taking Iron.  Some constipation.  O: Vitals: /70 (BP Location: Left arm, Cuff Size: Adult Regular)   Wt 69.9 kg (154 lb)   LMP 06/01/2021   BMI 24.86 kg/m    BMI= Body mass index is 24.86 kg/m .  Exam:  Constitutional: healthy, alert and no distress  Respiratory: respirations even and unlabored  Gastrointestinal: Abdomen soft, non-tender. Fundus measures appropriate for gestational age. Fetal heart tones hear without difficulty and within normal limits  : Deferred  Psychiatric: mentation appears normal and affect normal/bright  A: No diagnosis found.  P: Discussed plans for labor. Discussed patient options for postpartum contraception. Patient is planning vasectomy  Encouraged patient to call with any questions or concerns.  Recommended Stool Softener  Repeat CBC in 1-2 weeks  Referral sent to [facility] for f/u rt thyroid labs  Return to clinic 2 weeks    SWAPNIL Snell CNM

## 2022-01-16 NOTE — PATIENT INSTRUCTIONS
The number to reach the midwives in our clinic has changed. You can now contact them by calling our main clinic number at [phone] and then a message will be forwarded on to them from there. The number given to patients previously, is no longer being used.      The main clinic number is answered 24/7. If it is after normal clinic hours, or on weekends or Holidays our [facility] team will answer.      You can also continue to use [facility] for non-emergent needs.      Thank you,        BRAYAN [facility]

## 2022-01-16 NOTE — PROGRESS NOTES
S: Feels well,  Baby active.  Denies uterine cramping, vaginal bleeding or leaking of fluid. Having low midline pain that feels tender. Has not tried a support belt; will order today.   O: Vitals: LMP 2021   BMI= Body mass index is 25.34 kg/m .  Exam:   Constitutional: healthy, alert and no distress  Respiratory: respirations even and unlabored  Gastrointestinal: Abdomen soft, non-tender. Fundus measures appropriate for gestational age. Fetal heart tones hear without difficulty and within normal limits.  : Deferred  Psychiatric: mentation appears normal and affect normal/bright    A:     ICD-10-CM    1. Low ferritin  R79.0 Hemoglobin     Ferritin     Hemoglobin     Ferritin   2. Supervision of high risk pregnancy, antepartum  O09.90      P: Discussed plans for labor and registration at the hospital. Patient has pediatrician picked out for . Plans to breastfeed after delivery. Suggested patient visiting [employer] for exercises to prepare for labor.   Encouraged patient to call with any questions or concerns.  Return to clinic 2 weeks    Denise Romero DNP Student   Patient was seen with student,  who was present for learning. I personally assessed, examined and made clinical decisions reflected in the documentation  Evelia Ramos CNM

## 2022-01-17 ENCOUNTER — TELEPHONE (OUTPATIENT)
Dept: ENDOCRINOLOGY | Facility: CLINIC | Age: 32
End: 2022-01-17

## 2022-01-17 ENCOUNTER — PRENATAL OFFICE VISIT (OUTPATIENT)
Dept: OBGYN | Facility: CLINIC | Age: 32
End: 2022-01-17
Payer: COMMERCIAL

## 2022-01-17 ENCOUNTER — VIRTUAL VISIT (OUTPATIENT)
Dept: ENDOCRINOLOGY | Facility: CLINIC | Age: 32
End: 2022-01-17
Payer: COMMERCIAL

## 2022-01-17 VITALS — SYSTOLIC BLOOD PRESSURE: 116 MMHG | DIASTOLIC BLOOD PRESSURE: 78 MMHG | BODY MASS INDEX: 25.34 KG/M2 | WEIGHT: 157 LBS

## 2022-01-17 DIAGNOSIS — E07.9 DISORDER OF THYROID: ICD-10-CM

## 2022-01-17 DIAGNOSIS — O09.90 SUPERVISION OF HIGH RISK PREGNANCY, ANTEPARTUM: ICD-10-CM

## 2022-01-17 DIAGNOSIS — R79.0 LOW FERRITIN: Primary | ICD-10-CM

## 2022-01-17 DIAGNOSIS — E05.90 SUBCLINICAL HYPERTHYROIDISM: Primary | ICD-10-CM

## 2022-01-17 PROBLEM — D50.9 ANEMIA, IRON DEFICIENCY: Status: ACTIVE | Noted: 2022-01-17

## 2022-01-17 LAB
FERRITIN SERPL-MCNC: 16 NG/ML (ref 12–150)
HGB BLD-MCNC: 10 G/DL (ref 11.7–15.7)

## 2022-01-17 PROCEDURE — 82728 ASSAY OF FERRITIN: CPT | Performed by: ADVANCED PRACTICE MIDWIFE

## 2022-01-17 PROCEDURE — 99214 OFFICE O/P EST MOD 30 MIN: CPT | Mod: 95 | Performed by: INTERNAL MEDICINE

## 2022-01-17 PROCEDURE — 36415 COLL VENOUS BLD VENIPUNCTURE: CPT | Performed by: ADVANCED PRACTICE MIDWIFE

## 2022-01-17 PROCEDURE — 99207 PR PRENATAL VISIT: CPT | Performed by: ADVANCED PRACTICE MIDWIFE

## 2022-01-17 PROCEDURE — 85018 HEMOGLOBIN: CPT | Performed by: ADVANCED PRACTICE MIDWIFE

## 2022-01-17 NOTE — LETTER
"    1/17/2022         RE: Daniel Aguilar  [address]  [place] [address]        Dear Colleague,    Thank you for referring your patient, Daniel Aguilar, to the [facility]. Please see a copy of my visit note below.    Daniel Aguilar  is being evaluated via a billable video visit.      How would you like to obtain your AVS? [employer] [facility]  For the video visit, send the invitation by: Text to cell phone: [phone]  Will anyone else be joining your video visit? No          THIS IS A VIDEO VISIT:    Phone call visit/virtual visit encounter:    Name of patient: Daniel Steiner    Date of encounter: 1/17/2022    Time of start of video visit: 10:32    Video started: 10:42    Video ended: 10:49    Provider location: working from home/ [facility]    Patient location: patients home.    Mode of transmission: video/ Doximity    Verbal consent: obtained before starting visit. Pt is agreeable.      The patient has been notified of following:      \"This VIDEO visit will be conducted via a call between you and your physician/provider. We have found that certain health care needs can be provided without the need for a physical exam.  This service lets us provide the care you need with a short phone conversation.  If a prescription is necessary we can send it directly to your pharmacy.  If lab work is needed we can place an order for that and you can then stop by our lab to have the test done at a later time.     With new updates with corona virus patient might be billed as clinic visit.     If during the course of the call the physician/provider feels a telephone visit is not appropriate, you will not be charged for this service.\"      Past medical history, social history, family history, allergy and medications were reviewed and updated as appropriate.  Reviewed pertinent labs, notes, imaging studies personally.    Name: Daniel Steiner  Seen for f/u of  subclinical Hyperthyroidism and " Thyroid dysfunction in pregnancy in first trimester.  HPI:  Daniel Steiner is a 31 year old female who presents for the evaluation of Subclinical Hyperthyroidism and Thyroid dysfunction in pregnancy in first trimester.   has a past medical history of Abnormal Pap smear of cervix (2018), Anemia during pregnancy in third trimester (01/15/2021), Depressive disorder, and Mild preeclampsia delivered (03/26/2021).    She is pregnant- about 33 pregnant.  JUSTIN: March 2022.  Has one child- had similar thyroid dysfunction. She was not seen by endocrinologist at that time and plan was to continue to monitor. She was not on thyroid hormone replacement or antithyroid medication at that time.  She reports that historically her thyroid labs were up and down.  She was getting care at [facility] before. Records from outside reviewed. Typically TSH running on lower side.     Never been on thyroid hormone replacement.  Feeling OK.  Sometimes tired.  Has gained about 10 lbs in pregnancy.  She is planning to breastfeed.    History of radiation exposure: NO  Palpitations:  On and off X now more often. She had heart monitor done in past and was OK.  Changes to hair or skin: No  Diarrhea/Constipation:No  Changes in vision:No  Diplopia/Blurryness:No  Dysphagia or Shortness of breath:No  Tremors:No  Difficulty sleeping:No  Changes in weight: gained about 10 lbs in last pregnancy.  Lithium/Amiodarone: No  URI: No  CT Scans:No    PMH/PSH:  Past Medical History:   Diagnosis Date     Abnormal Pap smear of cervix 2018    8/10/21     Anemia during pregnancy in third trimester 01/15/2021    Oral iron started at 28w  Fe transfusions started (last scheduled 2/5/2021)  ____Recheck at 36 weeks     Depressive disorder     anxiety-well managed, no meds     Mild preeclampsia delivered 03/26/2021     Past Surgical History:   Procedure Laterality Date     D & C  09/2018    missed AB     SEPTOPLASTY  2004     TONSILLECTOMY & ADENOIDECTOMY  2004     Family  Hx:  Family History   Problem Relation Age of Onset     Colon Cancer Maternal Grandfather      Colon Cancer Maternal Aunt        Social Hx:  Social History     Socioeconomic History     Marital status:      Spouse name: Silviano     Number of children: 0     Years of education: Not on file     Highest education level: Some college, no degree   Occupational History     Occupation: [employer]   Tobacco Use     Smoking status: Never Smoker     Smokeless tobacco: Never Used   Vaping Use     Vaping Use: Never used   Substance and Sexual Activity     Alcohol use: Not Currently     Drug use: Never     Sexual activity: Yes     Partners: Male   Other Topics Concern     Not on file   Social History Narrative     Not on file     Social Determinants of Health     Financial Resource Strain: Low Risk      Difficulty of Paying Living Expenses: Not hard at all   Food Insecurity: No Food Insecurity     Worried About Running Out of Food in the Last Year: Never true     Ran Out of Food in the Last Year: Never true   Transportation Needs: No Transportation Needs     Lack of Transportation (Medical): No     Lack of Transportation (Non-Medical): No   Physical Activity: Not on file   Stress: Not on file   Social Connections: Not on file   Intimate Partner Violence: Not on file   Housing Stability: Not on file          MEDICATIONS:  has a current medication list which includes the following prescription(s): aspirin, docusate sodium, ferrous gluconate, and pnv prenatal plus multivitamin.    ROS   ROS: 10 point ROS neg other than the symptoms noted above in the HPI.    Physical Exam   VS: LMP 06/01/2021   GENERAL: healthy, alert and no distress  EYES: Eyes grossly normal to inspection, conjunctivae and sclerae normal  ENT: no nose swelling, nasal discharge.  Thyroid: no apparent thyroid nodules  RESP: no audible wheeze, cough, or visible cyanosis.  No visible retractions or increased work of breathing.  Able to speak fully in  complete sentences.  ABDO: not evaluated.  EXTREMITIES: no hand tremors.  NEURO: Cranial nerves grossly intact, mentation intact and speech normal  SKIN: No apparent skin lesions, rash or edema seen   PSYCH: mentation appears normal, affect normal/bright, judgement and insight intact, normal speech and appearance well-groomed    LABS:  TFTs:  ENDO THYROID LABS-[facility] Latest Ref Rng & Units 12/1/2021   TSH 0.40 - 4.00 mU/L 0.17 (L)   FREE T3 2.3 - 4.2 pg/mL 3.0   FREE T4 0.76 - 1.46 ng/dL 1.12   THYROID STIM IMMUNOG <=1.3 TSI index <1.0       CBC:  WBC   Date Value Ref Range Status   04/23/2021 6.3 4.0 - 11.0 10e9/L Final     WBC Count   Date Value Ref Range Status   12/21/2021 5.0 4.0 - 11.0 10e3/uL Final       LFTs:  Liver Function Studies -   Recent Labs   Lab Test 08/11/21  0921   AST 6   ALT 11         All pertinent notes, labs, and images personally reviewed by me.     A/P  Ms.Andrea Steiner is a 31 year old here for the evaluation of hyperthyroidism.  Subclinical hyperthyroidism (TSI neg):  Differential for hyperthyroidism includes: hCG induced hyperthyroidism, Graves' disease, thyroiditis, or autonomous hyperfunctioning nodule.   Currently she is 33 weeks pregnant with JUSTIN in March 2022.  She had similar thyroid labs with first pregnancy. Plan was to continue to monitor at that time.  Historically noted to have low TSH.  Clinically looks euthyroid.  Plan:  Discussed diagnosis, pathophysiology, management and treatment options of condition with pt.  Discussed pregnancy related thyroid changes.  Discussed subclinical hyperthyroidism in general and treatment indications during pregnancy.  Plan to continue to monitor.  Labs in 6 weeks, post delivery (4-6 weeks after) and after done with breastfeeding.  Please make a lab appointment for blood work and follow up clinic appointment in 1 week after that to discuss results.    All questions were answered.  The patient indicates understanding of the above issues and agrees  with the plan set forth.      Follow-up:  Post delivery.    Maria Elena Escalona MD  Endocrinology  [facility]  CC: No Ref-Primary, Physician       All questions were answered.  The patient indicates understanding of the above issues and agrees with the plan set forth.           Again, thank you for allowing me to participate in the care of your patient.        Sincerely,        Maria Elena Escalona MD

## 2022-01-17 NOTE — PATIENT INSTRUCTIONS
-[facility]  Dr Escalona, Endocrinology Department    [place] [address] [id number]  [place] [address]  Appointment [phone]  Fax: [phone]  Tyro: Monday - Thursday      Plan to continue to monitor.  Labs in 6 weeks, post delivery (4-6 weeks after) and after done with breastfeeding.  Please make a lab appointment for blood work and follow up clinic appointment in 1 week after that to discuss results.

## 2022-01-17 NOTE — PROGRESS NOTES
"THIS IS A VIDEO VISIT:    Phone call visit/virtual visit encounter:    Name of patient: Daniel Steiner    Date of encounter: 1/17/2022    Time of start of video visit: 10:32    Video started: 10:42    Video ended: 10:49    Provider location: working from [place]/ [facility]    Patient location: patients home.    Mode of transmission: video/ Doximity    Verbal consent: obtained before starting visit. Pt is agreeable.      The patient has been notified of following:      \"This VIDEO visit will be conducted via a call between you and your physician/provider. We have found that certain health care needs can be provided without the need for a physical exam.  This service lets us provide the care you need with a short phone conversation.  If a prescription is necessary we can send it directly to your pharmacy.  If lab work is needed we can place an order for that and you can then stop by our lab to have the test done at a later time.     With new updates with corona virus patient might be billed as clinic visit.     If during the course of the call the physician/provider feels a telephone visit is not appropriate, you will not be charged for this service.\"      Past medical history, social history, family history, allergy and medications were reviewed and updated as appropriate.  Reviewed pertinent labs, notes, imaging studies personally.    Name: Daniel Steiner  Seen for f/u of  subclinical Hyperthyroidism and Thyroid dysfunction in pregnancy in first trimester.  HPI:  Daniel Steiner is a 31 year old female who presents for the evaluation of Subclinical Hyperthyroidism and Thyroid dysfunction in pregnancy in first trimester.   has a past medical history of Abnormal Pap smear of cervix (2018), Anemia during pregnancy in third trimester (01/15/2021), Depressive disorder, and Mild preeclampsia delivered (03/26/2021).    She is pregnant- about 33 pregnant.  JUSTIN: March 2022.  Has one child- had similar thyroid dysfunction. " She was not seen by endocrinologist at that time and plan was to continue to monitor. She was not on thyroid hormone replacement or antithyroid medication at that time.  She reports that historically her thyroid labs were up and down.  [facility] from [facility] reviewed. Typically TSH running on lower side.     Never been on thyroid hormone replacement.  Feeling OK.  Sometimes tired.  Has gained about 10 lbs in pregnancy.  She is planning to breastfeed.    History of radiation exposure: NO  Palpitations:  On and off X now more often. She had heart monitor done in past and was OK.  Changes to hair or skin: No  Diarrhea/Constipation:No  Changes in vision:No  Diplopia/Blurryness:No  Dysphagia or Shortness of breath:No  Tremors:No  Difficulty sleeping:No  Changes in weight: gained about 10 lbs in last pregnancy.  Lithium/Amiodarone: No  URI: No  CT Scans:No    PMH/PSH:  Past Medical History:   Diagnosis Date     Abnormal Pap smear of cervix 2018    8/10/21     Anemia during pregnancy in third trimester 01/15/2021    Oral iron started at 28w  Fe transfusions started (last scheduled 2/5/2021)  ____Recheck at 36 weeks     Depressive disorder     anxiety-well managed, no meds     Mild preeclampsia delivered 03/26/2021     Past Surgical History:   Procedure Laterality Date     D & C  09/2018    missed AB     SEPTOPLASTY  2004     TONSILLECTOMY & ADENOIDECTOMY  2004     Family Hx:  Family History   Problem Relation Age of Onset     Colon Cancer Maternal Grandfather      Colon Cancer Maternal Aunt        Social Hx:  Social History     Socioeconomic History     Marital status:      Spouse name: Silviano     Number of children: 0     Years of education: Not on file     Highest education level: Some college, no degree   Occupational History     Occupation: [employer]   Tobacco Use     Smoking status: Never Smoker     Smokeless tobacco: Never Used   Vaping Use     Vaping Use:  Never used   Substance and Sexual Activity     Alcohol use: Not Currently     Drug use: Never     Sexual activity: Yes     Partners: Male   Other Topics Concern     Not on file   Social History Narrative     Not on file     Social Determinants of Health     Financial Resource Strain: Low Risk      Difficulty of Paying Living Expenses: Not hard at all   Food Insecurity: No Food Insecurity     Worried About Running Out of Food in the Last Year: Never true     Ran Out of Food in the Last Year: Never true   Transportation Needs: No Transportation Needs     Lack of Transportation (Medical): No     Lack of Transportation (Non-Medical): No   Physical Activity: Not on file   Stress: Not on file   Social Connections: Not on file   Intimate Partner Violence: Not on file   Housing Stability: Not on file          MEDICATIONS:  has a current medication list which includes the following prescription(s): aspirin, docusate sodium, ferrous gluconate, and pnv prenatal plus multivitamin.    ROS   ROS: 10 point ROS neg other than the symptoms noted above in the HPI.    Physical Exam   VS: LMP 06/01/2021   GENERAL: healthy, alert and no distress  EYES: Eyes grossly normal to inspection, conjunctivae and sclerae normal  ENT: no nose swelling, nasal discharge.  Thyroid: no apparent thyroid nodules  RESP: no audible wheeze, cough, or visible cyanosis.  No visible retractions or increased work of breathing.  Able to speak fully in complete sentences.  ABDO: not evaluated.  EXTREMITIES: no hand tremors.  NEURO: Cranial nerves grossly intact, mentation intact and speech normal  SKIN: No apparent skin lesions, rash or edema seen   PSYCH: mentation appears normal, affect normal/bright, judgement and insight intact, normal speech and appearance well-groomed    LABS:  TFTs:  ENDO THYROID LABS-[facility] Latest Ref Rng & Units 12/1/2021   TSH 0.40 - 4.00 mU/L 0.17 (L)   FREE T3 2.3 - 4.2 pg/mL 3.0   FREE T4 0.76 - 1.46 ng/dL 1.12   THYROID STIM IMMUNOG  <=1.3 TSI index <1.0       CBC:  WBC   Date Value Ref Range Status   04/23/2021 6.3 4.0 - 11.0 10e9/L Final     WBC Count   Date Value Ref Range Status   12/21/2021 5.0 4.0 - 11.0 10e3/uL Final       LFTs:  Liver Function Studies -   Recent Labs   Lab Test 08/11/21  0921   AST 6   ALT 11         All pertinent notes, labs, and images personally reviewed by me.     A/P  Ms.Andrea Steiner is a 31 year old here for the evaluation of hyperthyroidism.  Subclinical hyperthyroidism (TSI neg):  Differential for hyperthyroidism includes: hCG induced hyperthyroidism, Graves' disease, thyroiditis, or autonomous hyperfunctioning nodule.   Currently she is 33 weeks pregnant with JUSTIN in March 2022.  She had similar thyroid labs with first pregnancy. Plan was to continue to monitor at that time.  Historically noted to have low TSH.  Clinically looks euthyroid.  Plan:  Discussed diagnosis, pathophysiology, management and treatment options of condition with pt.  Discussed pregnancy related thyroid changes.  Discussed subclinical hyperthyroidism in general and treatment indications during pregnancy.  Plan to continue to monitor.  Labs in 6 weeks, post delivery (4-6 weeks after) and after done with breastfeeding.  Please make a lab appointment for blood work and follow up clinic appointment in 1 week after that to discuss results.    All questions were answered.  The patient indicates understanding of the above issues and agrees with the plan set forth.      Follow-up:  Post delivery.    Maria Elena Escalona MD  Endocrinology  [facility]  CC: No Ref-Primary, Physician       All questions were answered.  The patient indicates understanding of the above issues and agrees with the plan set forth.

## 2022-01-17 NOTE — PROGRESS NOTES
Daniel Aguilar  is being evaluated via a billable video visit.      How would you like to obtain your AVS? [employer]  For the video visit, send the invitation by: Text to cell phone: [phone]  Will anyone else be joining your video visit? No

## 2022-01-31 ENCOUNTER — TELEPHONE (OUTPATIENT)
Dept: ENDOCRINOLOGY | Facility: CLINIC | Age: 32
End: 2022-01-31
Payer: COMMERCIAL

## 2022-01-31 NOTE — TELEPHONE ENCOUNTER
----- Message from Angela Velez RN sent at 1/27/2022  8:51 AM CST -----  Regarding: FW: Scheduling F/U  Plan to continue to monitor.  Labs in 6 weeks, post delivery (4-6 weeks after) and after done with breastfeeding.  Please make a lab appointment for blood work and follow up clinic appointment in 1 week after that to discuss results.  ----- Message -----  From: Roseline Lott  Sent: 1/27/2022   8:39 AM CST  To: [facility]  Subject: Scheduling F/U                                   Hello,     This patient needs further outreach attempt for scheduling follow up. Our virtual facilitator team outreached initially and didn't reach the patient, so further follow up scheduling attempt is needed on this patient for Dr. Escalona as outlined on the check out report.       Thanks!      Sincerely, Virtual Visit Facilitator team

## 2022-02-01 ENCOUNTER — PRENATAL OFFICE VISIT (OUTPATIENT)
Dept: MIDWIFE SERVICES | Facility: CLINIC | Age: 32
End: 2022-02-01
Payer: COMMERCIAL

## 2022-02-01 VITALS — DIASTOLIC BLOOD PRESSURE: 88 MMHG | BODY MASS INDEX: 26.63 KG/M2 | WEIGHT: 165 LBS | SYSTOLIC BLOOD PRESSURE: 132 MMHG

## 2022-02-01 DIAGNOSIS — O99.011 ANEMIA DURING PREGNANCY IN FIRST TRIMESTER: ICD-10-CM

## 2022-02-01 DIAGNOSIS — Z34.83 ENCOUNTER FOR SUPERVISION OF OTHER NORMAL PREGNANCY IN THIRD TRIMESTER: Primary | ICD-10-CM

## 2022-02-01 PROCEDURE — 99207 PR PRENATAL VISIT: CPT | Performed by: ADVANCED PRACTICE MIDWIFE

## 2022-02-01 NOTE — NURSING NOTE
"Chief Complaint   Patient presents with     Prenatal Care     35w 0d       Initial /88 (BP Location: Left arm, Cuff Size: Adult Regular)   Wt 74.8 kg (165 lb)   LMP 2021   Breastfeeding No   BMI 26.63 kg/m   Estimated body mass index is 26.63 kg/m  as calculated from the following:    Height as of 21: 1.676 m (5' 6\").    Weight as of this encounter: 74.8 kg (165 lb).  BP completed using cuff size: regular    Questioned patient about current smoking habits.  Pt. has never smoked.               "

## 2022-02-01 NOTE — PROGRESS NOTES
S: Feels well,  Baby active.  Denies uterine cramping, vaginal bleeding or leaking of fluid. No headache, vision changes, epigastric. Had some free ultrasounds from [facility] ultrasound training. She was still transverse.   O: Vitals: LMP 06/01/2021   BMI= Body mass index is 26.63 kg/m .  Exam:  Constitutional: healthy, alert and no distress  Respiratory: respirations even and unlabored  Gastrointestinal: Abdomen soft, non-tender. Fundus measures appropriate for gestational age. Fetal heart tones hear without difficulty and within normal limits  : Deferred  Psychiatric: mentation appears normal and affect normal/bright  A:     ICD-10-CM    1. Encounter for supervision of other normal pregnancy in third trimester  Z34.83    2. Anemia during pregnancy in first trimester  O99.011      P: Discussed GBS/hgb screen for next visit.   Discussed plans for labor.  Reviewed updated Covid policy  Encouraged patient to call with any questions or concerns.  Return to clinic 2 weeks  Evelia Ramos CNM

## 2022-02-02 NOTE — TELEPHONE ENCOUNTER
FYI, f/ups had to be scheduled on MARGARET spots, since provider wanted pt to f/up 1 week after labs have been completed.

## 2022-02-09 ENCOUNTER — PRENATAL OFFICE VISIT (OUTPATIENT)
Dept: MIDWIFE SERVICES | Facility: CLINIC | Age: 32
End: 2022-02-09
Payer: COMMERCIAL

## 2022-02-09 ENCOUNTER — THERAPY VISIT (OUTPATIENT)
Dept: PHYSICAL THERAPY | Facility: CLINIC | Age: 32
End: 2022-02-09
Payer: COMMERCIAL

## 2022-02-09 VITALS
BODY MASS INDEX: 27.16 KG/M2 | HEIGHT: 66 IN | DIASTOLIC BLOOD PRESSURE: 78 MMHG | SYSTOLIC BLOOD PRESSURE: 120 MMHG | WEIGHT: 169 LBS

## 2022-02-09 DIAGNOSIS — Z34.82 ENCOUNTER FOR SUPERVISION OF OTHER NORMAL PREGNANCY IN SECOND TRIMESTER: ICD-10-CM

## 2022-02-09 DIAGNOSIS — M62.81 MUSCLE WEAKNESS (GENERALIZED): ICD-10-CM

## 2022-02-09 DIAGNOSIS — N39.3 FEMALE STRESS INCONTINENCE: ICD-10-CM

## 2022-02-09 DIAGNOSIS — Z36.85 SCREENING, ANTENATAL, FOR STREPTOCOCCUS B: Primary | ICD-10-CM

## 2022-02-09 DIAGNOSIS — Z34.83 ENCOUNTER FOR SUPERVISION OF OTHER NORMAL PREGNANCY IN THIRD TRIMESTER: ICD-10-CM

## 2022-02-09 DIAGNOSIS — R32 URINARY INCONTINENCE, UNSPECIFIED TYPE: ICD-10-CM

## 2022-02-09 PROCEDURE — 97161 PT EVAL LOW COMPLEX 20 MIN: CPT | Mod: GP | Performed by: PHYSICAL THERAPIST

## 2022-02-09 PROCEDURE — 97110 THERAPEUTIC EXERCISES: CPT | Mod: GP | Performed by: PHYSICAL THERAPIST

## 2022-02-09 PROCEDURE — 99207 PR PRENATAL VISIT: CPT | Performed by: ADVANCED PRACTICE MIDWIFE

## 2022-02-09 PROCEDURE — 87653 STREP B DNA AMP PROBE: CPT | Performed by: ADVANCED PRACTICE MIDWIFE

## 2022-02-09 PROCEDURE — 97535 SELF CARE MNGMENT TRAINING: CPT | Mod: GP | Performed by: PHYSICAL THERAPIST

## 2022-02-09 ASSESSMENT — MIFFLIN-ST. JEOR: SCORE: 1498.33

## 2022-02-09 NOTE — NURSING NOTE
"Chief Complaint   Patient presents with     Prenatal Care     36 1/7 weeks       Initial /78 (BP Location: Right arm, Patient Position: Chair, Cuff Size: Adult Regular)   Ht 1.676 m (5' 6\")   Wt 76.7 kg (169 lb)   LMP 2021   Breastfeeding No   BMI 27.28 kg/m   Estimated body mass index is 27.28 kg/m  as calculated from the following:    Height as of this encounter: 1.676 m (5' 6\").    Weight as of this encounter: 76.7 kg (169 lb).  BP completed using cuff size: regular    Questioned patient about current smoking habits.  Pt. has never smoked.          The following HM Due: NONE  +fetal movement  -swelling      Melly Steven, CMA    "

## 2022-02-09 NOTE — PROGRESS NOTES
Physical Therapy Initial Evaluation  Subjective:    Patient Health History  Daniel Aguilar being seen for Pelvic floor therapy.     Date of Onset: after first baby 11 months ago.      Pain is reported as 0/10 on pain scale.  General health as reported by patient is good.  Pertinent medical history includes: anemia, currently pregnant and thyroid problems.   Red flags:  None as reported by patient.  Medical allergies: none.   Surgeries include:  None.     Other medications details: Prenatal vitamins, iron, B12, Vitamin C.    Current occupation is [employer].   Primary job tasks include:  Computer work and prolonged sitting.                  Therapist Generated HPI Evaluation  Problem details: Patient has chief complaint of stress incontinence which started 11 months ago after the birth of her first baby. She is 36 weeks gestation with her second baby. Her first delivery was vaginal with very mild tearing, no episiotomy. No previous incontinence prior to pregnancies. Symptoms are aggravated by coughing and sneezing and are worse when standing. She had Covid-19 last October and symptoms were the worst then due to a lot of coughing. No urinary frequency or urge. No pelvic pain or low back pain. No bowel problems. .         Type of problem:  Incontinence.    This is a chronic condition.  Condition occurred with:  After pregnancy.    Patient reports pain:  N/a.      Since onset symptoms are unchanged.  Symptoms are exacerbated by coughing and sneezing (sit to stand sometimes)  and relieved by nothing.      Restrictions due to condition include:  Working in normal job without restrictions.  Barriers include:  None as reported by patient.                        Objective:  System                                 Pelvic Dysfunction Evaluation:    Bladder/Pelvic Problems:    Storage Problem:  Stress incontinence        Diagnostic Tests:  none                        Flexibility:  normal              External Assessment:   External assessment pelvic: deferred.              Internal Assessment:  Internal assessment pelvic: deferred.              SEMG Biofeedback:    Equipment:  Telesis    Suraface electrode placement--Perianal:  Yes  Baseline EMG PM:  Initially=5.0 mv; at end of session=1.5 mv    Peak pelvic muscle contraction:  18 mv  Sustained contraction:  8 mv  EMG interpretation to fatigue:  Less than3 seconds  Position:  Sitting, standing and sidelyingAdditional History:  Delivery History:  Tearing and vaginal delivery  Number of Pregnancies: 2  Number of Live Births: 1                       General     ROS    Assessment/Plan:    Patient is a 31 year old female with pelvic complaints.    Patient has the following significant findings with corresponding treatment plan.                Diagnosis 1:  Stress incontinence  Decreased strength - therapeutic exercise, therapeutic activities and home program  Impaired muscle performance - biofeedback and neuro re-education  Decreased function - therapeutic activities and home program    Therapy Evaluation Codes:   1) History comprised of:   Personal factors that impact the plan of care:      None.    Comorbidity factors that impact the plan of care are:      None.     Medications impacting care: None.  2) Examination of Body Systems comprised of:   Body structures and functions that impact the plan of care:      Pelvis.   Activity limitations that impact the plan of care are:      Stress incontinence.  3) Clinical presentation characteristics are:   Stable/Uncomplicated.  4) Decision-Making    Low complexity using standardized patient assessment instrument and/or measureable assessment of functional outcome.  Cumulative Therapy Evaluation is: Low complexity.    Previous and current functional limitations:  (See Goal Flow Sheet for this information)    Short term and Long term goals: (See Goal Flow Sheet for this information)     Communication ability:  Patient appears to be able to clearly  communicate and understand verbal and written communication and follow directions correctly.  Treatment Explanation - The following has been discussed with the patient:   RX ordered/plan of care  Anticipated outcomes  Possible risks and side effects  This patient would benefit from PT intervention to resume normal activities.   Rehab potential is excellent.    Frequency:  2 X a month, once daily  Duration:  for 2 months  Discharge Plan:  Achieve all LTG.  Independent in home treatment program.  Reach maximal therapeutic benefit.    Please refer to the daily flowsheet for treatment today, total treatment time and time spent performing 1:1 timed codes.

## 2022-02-09 NOTE — PROGRESS NOTES
Feeling well.  Baby is active. Denies any leaking of fluid, vaginal bleeding, regular uterine contractions, or headaches or other concerns.  GBS today.  Cephalic by BSUS - continue to watch position.    Taking iron daily.  Does not want IV iron. She agrees to hemoglobin check next week.    They plan a vasectomy and abstinence until it is effective.    Reviewed to call for contractions, loss of fluid, vaginal bleeding, decreased fetal movement or any other questions or concerns.    RTC in 1 weeks.  Stephanie Martínez, SHAINA, APRN, CNM

## 2022-02-10 LAB
GP B STREP DNA SPEC QL NAA+PROBE: NEGATIVE
PATIENT PENICILLIN, AMOXICILLIN, CEPHALOSPORINS ALLERGY: NO

## 2022-02-16 ENCOUNTER — PRENATAL OFFICE VISIT (OUTPATIENT)
Dept: MIDWIFE SERVICES | Facility: CLINIC | Age: 32
End: 2022-02-16
Payer: COMMERCIAL

## 2022-02-16 VITALS
DIASTOLIC BLOOD PRESSURE: 72 MMHG | BODY MASS INDEX: 26.92 KG/M2 | WEIGHT: 167.5 LBS | SYSTOLIC BLOOD PRESSURE: 112 MMHG | HEIGHT: 66 IN

## 2022-02-16 DIAGNOSIS — O09.90 SUPERVISION OF HIGH RISK PREGNANCY, ANTEPARTUM: ICD-10-CM

## 2022-02-16 DIAGNOSIS — Z34.83 ENCOUNTER FOR SUPERVISION OF OTHER NORMAL PREGNANCY IN THIRD TRIMESTER: Primary | ICD-10-CM

## 2022-02-16 DIAGNOSIS — O99.011 ANEMIA DURING PREGNANCY IN FIRST TRIMESTER: ICD-10-CM

## 2022-02-16 PROCEDURE — 99207 PR PRENATAL VISIT: CPT | Performed by: REGISTERED NURSE

## 2022-02-16 NOTE — PATIENT INSTRUCTIONS
"Weeks 37 thru 40 - Gestational Age (Fetal Age - Weeks 35 thru 38):  At 38 weeks the fetus is considered full term and is ready to make its appearance at any time. As your baby becomes bigger, you may notice a change in fetal movement. If you notice a decrease in fetal movement, make sure to talk with your doctor. The fingernails have grown long and will need to be cut soon after birth. Small breast buds are present on both sexes. The mother is supplying the fetus with antibodies that will help protect against disease. All organs are developed, with the lungs maturing all the way until the day of delivery. The fetus is about 19 - 21 inches in length and weighs anywhere from 6   lbs to 10 lbs.  Labor Instructions for Midwife Patients    When to call:  Both during and after office hours call [phone]. There is a Nurse Midwife available to take your calls and answer your questions 24 hours a day.     When to call:  Call anytime you have important concerns about you or your baby.     Call if:    You are having contractions at regular intervals about 5-6 minutes apart lasting 30-60 seconds and becoming increasingly more intense     You have an uncontrollable gush of fluid from your vagina or feel a pop and gush like your water has broken    You have HEAVY bleeding, like heavy period, blood running down your legs, or  soaking a pad.     Some bleeding after a pelvic exam, after intercourse, or in labor when your cervix is dilating is normal and is referred to as \"bloody show\"    You have severe, continuous back or abdominal pain    You feel it is time to go to the hospital    If this is your first labor, call when contractions are very intense and have been about every 3-4 minutes for about an hour    If it is your second labor or more, call when contractions are strong and about every 3-5 minutes or sooner depending on your level of discomfort.     Keep in mind we are always here for you! If you have questions, " concerns please don't hesitate to call us.     What to eat/drink in labor: Drink plenty of fluid (water most importantly, juice, soda or tea without caffeine). Eat rice, pasta, soup, cereal, bread/toast, and fruit. Avoid dairy and greasy food as they are difficult to digest and you may experience some nausea during labor.    Comfort measures:    Baths and showers (ok even with ruptured membranes, it may temporarily slow contractions if you are still in the early stage of labor)    Warm/hot packs for back pain or discomfort    Back, belly, or thigh massages    Standing, rocking, walking, leaning over bed or tables, side-lying and sleeping    Miscellaneous:     Contractions are timed from the beginning of one to the beginning of the next    Try hard to sleep during the early stage of labor when you are not that uncomfortable. Timing of contractions at this point is not important    Even if you cannot sleep, resting in bed or on the couch can help you maintain your energy for labor    When you arrive at the hospital the nurse will check your baby's heartbeat, check your cervix, and will call us. The midwife on call will come in and be with you when you are in active labor    After hours you need to enter the hospital through the emergency room

## 2022-02-16 NOTE — NURSING NOTE
"Chief Complaint   Patient presents with     Prenatal Care     37w 1d       Initial /72 (BP Location: Left arm, Cuff Size: Adult Regular)   Ht 1.676 m (5' 6\")   Wt 76 kg (167 lb 8 oz)   LMP 2021   Breastfeeding No   BMI 27.04 kg/m   Estimated body mass index is 27.04 kg/m  as calculated from the following:    Height as of this encounter: 1.676 m (5' 6\").    Weight as of this encounter: 76 kg (167 lb 8 oz).  BP completed using cuff size: regular    Questioned patient about current smoking habits.  Pt. has never smoked.          "

## 2022-02-22 ENCOUNTER — PRENATAL OFFICE VISIT (OUTPATIENT)
Dept: MIDWIFE SERVICES | Facility: CLINIC | Age: 32
End: 2022-02-22
Payer: COMMERCIAL

## 2022-02-22 VITALS
BODY MASS INDEX: 27.27 KG/M2 | HEIGHT: 66 IN | DIASTOLIC BLOOD PRESSURE: 80 MMHG | SYSTOLIC BLOOD PRESSURE: 112 MMHG | WEIGHT: 169.7 LBS

## 2022-02-22 DIAGNOSIS — Z34.83 ENCOUNTER FOR SUPERVISION OF OTHER NORMAL PREGNANCY IN THIRD TRIMESTER: Primary | ICD-10-CM

## 2022-02-22 DIAGNOSIS — O99.011 ANEMIA DURING PREGNANCY IN FIRST TRIMESTER: ICD-10-CM

## 2022-02-22 DIAGNOSIS — K64.4 EXTERNAL HEMORRHOIDS: ICD-10-CM

## 2022-02-22 LAB — HGB BLD-MCNC: 10.3 G/DL (ref 11.7–15.7)

## 2022-02-22 PROCEDURE — 85018 HEMOGLOBIN: CPT | Performed by: REGISTERED NURSE

## 2022-02-22 PROCEDURE — 99207 PR PRENATAL VISIT: CPT | Performed by: REGISTERED NURSE

## 2022-02-22 PROCEDURE — 36415 COLL VENOUS BLD VENIPUNCTURE: CPT | Performed by: REGISTERED NURSE

## 2022-02-22 RX ORDER — HYDROCORTISONE 25 MG/G
CREAM TOPICAL 2 TIMES DAILY PRN
Qty: 30 G | Refills: 0 | Status: SHIPPED | OUTPATIENT
Start: 2022-02-22 | End: 2022-04-15

## 2022-02-22 NOTE — PATIENT INSTRUCTIONS
"Weeks 37 thru 40 - Gestational Age (Fetal Age - Weeks 35 thru 38):  At 38 weeks the fetus is considered full term and is ready to make its appearance at any time. As your baby becomes bigger, you may notice a change in fetal movement. If you notice a decrease in fetal movement, make sure to talk with your doctor. The fingernails have grown long and will need to be cut soon after birth. Small breast buds are present on both sexes. The mother is supplying the fetus with antibodies that will help protect against disease. All organs are developed, with the lungs maturing all the way until the day of delivery. The fetus is about 19 - 21 inches in length and weighs anywhere from 6   lbs to 10 lbs.    Labor Instructions for Midwife Patients    When to call:  Both during and after office hours call [phone]. There is a Nurse Midwife available to take your calls and answer your questions 24 hours a day.     When to call:  Call anytime you have important concerns about you or your baby.     Call if:    You are having contractions at regular intervals about 5-6 minutes apart lasting 30-60 seconds and becoming increasingly more intense     You have an uncontrollable gush of fluid from your vagina or feel a pop and gush like your water has broken    You have HEAVY bleeding, like heavy period, blood running down your legs, or  soaking a pad.     Some bleeding after a pelvic exam, after intercourse, or in labor when your cervix is dilating is normal and is referred to as \"bloody show\"    You have severe, continuous back or abdominal pain    You feel it is time to go to the hospital    If this is your first labor, call when contractions are very intense and have been about every 3-4 minutes for about an hour    If it is your second labor or more, call when contractions are strong and about every 3-5 minutes or sooner depending on your level of discomfort.     Keep in mind we are always here for you! If you have questions, " concerns please don't hesitate to call us.     What to eat/drink in labor: Drink plenty of fluid (water most importantly, juice, soda or tea without caffeine). Eat rice, pasta, soup, cereal, bread/toast, and fruit. Avoid dairy and greasy food as they are difficult to digest and you may experience some nausea during labor.    Comfort measures:    Baths and showers (ok even with ruptured membranes, it may temporarily slow contractions if you are still in the early stage of labor)    Warm/hot packs for back pain or discomfort    Back, belly, or thigh massages    Standing, rocking, walking, leaning over bed or tables, side-lying and sleeping    Miscellaneous:     Contractions are timed from the beginning of one to the beginning of the next    Try hard to sleep during the early stage of labor when you are not that uncomfortable. Timing of contractions at this point is not important    Even if you cannot sleep, resting in bed or on the couch can help you maintain your energy for labor    When you arrive at the hospital the nurse will check your baby's heartbeat, check your cervix, and will call us. The midwife on call will come in and be with you when you are in active labor    After hours you need to enter the hospital through the emergency room

## 2022-02-22 NOTE — NURSING NOTE
"Chief Complaint   Patient presents with     Prenatal Care     38w 0d       Initial /80 (BP Location: Right arm, Cuff Size: Adult Regular)   Ht 1.676 m (5' 6\")   Wt 77 kg (169 lb 11.2 oz)   LMP 2021   Breastfeeding No   BMI 27.39 kg/m   Estimated body mass index is 27.39 kg/m  as calculated from the following:    Height as of this encounter: 1.676 m (5' 6\").    Weight as of this encounter: 77 kg (169 lb 11.2 oz).  BP completed using cuff size: regular    Questioned patient about current smoking habits.  Pt. has never smoked.          "

## 2022-02-22 NOTE — PROGRESS NOTES
"S: Feels good overall,  Baby active.  Denies uterine cramping, vaginal bleeding or leaking of fluid. No headache, increase in edema, no epigastric pain.     Decrease in amount of contractions/[place]    Taking iron supplement.     Increased hemorrhoid pain in the last 2-3 days, feels related to how low baby is.       O: Vitals: /80 (BP Location: Right arm, Cuff Size: Adult Regular)   Ht 1.676 m (5' 6\")   Wt 77 kg (169 lb 11.2 oz)   LMP 06/01/2021   Breastfeeding No   BMI 27.39 kg/m    BMI= Body mass index is 27.39 kg/m .  Exam:  Constitutional: healthy, alert and no distress  Respiratory: respirations even and unlabored  Gastrointestinal: Abdomen soft, non-tender. Fundus measures appropriate for gestational age. Fetal heart tones hear without difficulty and within normal limits  : Deferred  Psychiatric: mentation appears normal and affect normal/bright  A:     ICD-10-CM    1. Encounter for supervision of other normal pregnancy in third trimester  Z34.83    2. Anemia during pregnancy in first trimester  O99.011      P: Labor signs and symptoms discussed, aware of numbers to call  Discussed warning signs of PIH/preeclampsia and patient will monitor.  GBS screen completed previously, negative . Discussed plans for labor. Patient is planning vasectomy. Looking into different bridge methods.  Encouraged patient to call with any questions or concerns.  Size has been consistently smaller than dates but baby feels low in pelvis and AGA by Leopold's   Left without hgb last visit- hgb today, already ordered.     Return to clinic 1 weeks    SWAPNIL Bazzi CNM        "

## 2022-03-01 ENCOUNTER — PRENATAL OFFICE VISIT (OUTPATIENT)
Dept: MIDWIFE SERVICES | Facility: CLINIC | Age: 32
End: 2022-03-01
Payer: COMMERCIAL

## 2022-03-01 VITALS — DIASTOLIC BLOOD PRESSURE: 78 MMHG | SYSTOLIC BLOOD PRESSURE: 110 MMHG | WEIGHT: 167 LBS | BODY MASS INDEX: 26.95 KG/M2

## 2022-03-01 DIAGNOSIS — O09.90 SUPERVISION OF HIGH RISK PREGNANCY, ANTEPARTUM: ICD-10-CM

## 2022-03-01 DIAGNOSIS — E05.90 SUBCLINICAL HYPERTHYROIDISM: ICD-10-CM

## 2022-03-01 PROCEDURE — 99207 PR PRENATAL VISIT: CPT | Performed by: ADVANCED PRACTICE MIDWIFE

## 2022-03-01 PROCEDURE — 59426 ANTEPARTUM CARE ONLY: CPT | Performed by: ADVANCED PRACTICE MIDWIFE

## 2022-03-01 NOTE — PROGRESS NOTES
S: Feels well overall, mood is stable.  Baby active.  Denies uterine cramping, vaginal bleeding or leaking of fluid. Reports occasional radha acosta contractions. No headache, increase in edema, no epigastric pain.   O: Vitals: /78   Wt 75.8 kg (167 lb)   LMP 06/01/2021   BMI 26.95 kg/m    BMI= Body mass index is 26.95 kg/m .  Exam:  Constitutional: healthy, alert and no distress  Respiratory: respirations even and unlabored  Gastrointestinal: Abdomen soft, non-tender. Fundus measures appropriate for gestational age. Fetal heart tones hear without difficulty and within normal limits  : Normal external genitalia without lesions. Cervix posterior, soft. Unable to assess if dilated.   Psychiatric: mentation appears normal and affect normal/bright  A:     ICD-10-CM    1. Supervision of high risk pregnancy, antepartum  O09.90    2. Subclinical hyperthyroidism  E05.90      P: Labor signs and symptoms discussed, aware of numbers to call.  Discussed warning signs of PIH/preeclampsia and patient will monitor.  GBS screen completed. Discussed plans for labor. Patient desires to labor in a tub; discussed tub labor here versus delivering at a nearby location with our sister CNM groups.  Discussed patient options for postpartum contraception. Patient is planning for Mirena IUD. Endocrine is managing thyroid.   Discussed postdates options.  Encouraged patient to call with any questions or concerns.  Return to clinic 1 weeks    Denise Romero DNP Student     Patient was seen with student,  who was present for learning. I personally assessed, examined and made clinical decisions reflected in the documentation  Evelia Ramos CNM

## 2022-03-04 ENCOUNTER — HOSPITAL ENCOUNTER (INPATIENT)
Facility: CLINIC | Age: 32
LOS: 2 days | Discharge: HOME | End: 2022-03-06
Attending: ADVANCED PRACTICE MIDWIFE | Admitting: ADVANCED PRACTICE MIDWIFE
Payer: COMMERCIAL

## 2022-03-04 ENCOUNTER — TELEPHONE (OUTPATIENT)
Dept: MIDWIFE SERVICES | Facility: CLINIC | Age: 32
End: 2022-03-04
Payer: COMMERCIAL

## 2022-03-04 ENCOUNTER — NURSE TRIAGE (OUTPATIENT)
Dept: NURSING | Facility: CLINIC | Age: 32
End: 2022-03-04
Payer: COMMERCIAL

## 2022-03-04 LAB
ABO/RH(D): NORMAL
SPECIMEN EXPIRATION DATE: NORMAL

## 2022-03-04 PROCEDURE — 250N000011 HC RX IP 250 OP 636: Performed by: ADVANCED PRACTICE MIDWIFE

## 2022-03-04 PROCEDURE — 86901 BLOOD TYPING SEROLOGIC RH(D): CPT | Performed by: ADVANCED PRACTICE MIDWIFE

## 2022-03-04 PROCEDURE — 85018 HEMOGLOBIN: CPT | Performed by: ADVANCED PRACTICE MIDWIFE

## 2022-03-04 PROCEDURE — 87635 SARS-COV-2 COVID-19 AMP PRB: CPT | Performed by: ADVANCED PRACTICE MIDWIFE

## 2022-03-04 PROCEDURE — 86780 TREPONEMA PALLIDUM: CPT | Performed by: ADVANCED PRACTICE MIDWIFE

## 2022-03-04 PROCEDURE — 120N000001 HC R&B MED SURG/OB

## 2022-03-04 PROCEDURE — 258N000003 HC RX IP 258 OP 636: Performed by: ADVANCED PRACTICE MIDWIFE

## 2022-03-04 RX ORDER — NALOXONE HYDROCHLORIDE 0.4 MG/ML
0.4 INJECTION, SOLUTION INTRAMUSCULAR; INTRAVENOUS; SUBCUTANEOUS
Status: DISCONTINUED | OUTPATIENT
Start: 2022-03-04 | End: 2022-03-05 | Stop reason: HOSPADM

## 2022-03-04 RX ORDER — IBUPROFEN 600 MG/1
600 TABLET, FILM COATED ORAL
Status: DISCONTINUED | OUTPATIENT
Start: 2022-03-04 | End: 2022-03-05

## 2022-03-04 RX ORDER — METHYLERGONOVINE MALEATE 0.2 MG/ML
200 INJECTION INTRAVENOUS
Status: DISCONTINUED | OUTPATIENT
Start: 2022-03-04 | End: 2022-03-05 | Stop reason: HOSPADM

## 2022-03-04 RX ORDER — OXYTOCIN 10 [USP'U]/ML
10 INJECTION, SOLUTION INTRAMUSCULAR; INTRAVENOUS
Status: DISCONTINUED | OUTPATIENT
Start: 2022-03-04 | End: 2022-03-05 | Stop reason: HOSPADM

## 2022-03-04 RX ORDER — KETOROLAC TROMETHAMINE 30 MG/ML
30 INJECTION, SOLUTION INTRAMUSCULAR; INTRAVENOUS
Status: DISCONTINUED | OUTPATIENT
Start: 2022-03-04 | End: 2022-03-05

## 2022-03-04 RX ORDER — MISOPROSTOL 200 UG/1
800 TABLET ORAL
Status: DISCONTINUED | OUTPATIENT
Start: 2022-03-04 | End: 2022-03-05 | Stop reason: HOSPADM

## 2022-03-04 RX ORDER — FENTANYL CITRATE 50 UG/ML
50-100 INJECTION, SOLUTION INTRAMUSCULAR; INTRAVENOUS
Status: DISCONTINUED | OUTPATIENT
Start: 2022-03-04 | End: 2022-03-05 | Stop reason: HOSPADM

## 2022-03-04 RX ORDER — CARBOPROST TROMETHAMINE 250 UG/ML
250 INJECTION, SOLUTION INTRAMUSCULAR
Status: DISCONTINUED | OUTPATIENT
Start: 2022-03-04 | End: 2022-03-05 | Stop reason: HOSPADM

## 2022-03-04 RX ORDER — ONDANSETRON 4 MG/1
4 TABLET, ORALLY DISINTEGRATING ORAL EVERY 6 HOURS PRN
Status: DISCONTINUED | OUTPATIENT
Start: 2022-03-04 | End: 2022-03-05 | Stop reason: HOSPADM

## 2022-03-04 RX ORDER — NALOXONE HYDROCHLORIDE 0.4 MG/ML
0.2 INJECTION, SOLUTION INTRAMUSCULAR; INTRAVENOUS; SUBCUTANEOUS
Status: DISCONTINUED | OUTPATIENT
Start: 2022-03-04 | End: 2022-03-05 | Stop reason: HOSPADM

## 2022-03-04 RX ORDER — SODIUM CHLORIDE, SODIUM LACTATE, POTASSIUM CHLORIDE, CALCIUM CHLORIDE 600; 310; 30; 20 MG/100ML; MG/100ML; MG/100ML; MG/100ML
INJECTION, SOLUTION INTRAVENOUS CONTINUOUS
Status: DISCONTINUED | OUTPATIENT
Start: 2022-03-05 | End: 2022-03-05 | Stop reason: HOSPADM

## 2022-03-04 RX ORDER — OXYTOCIN/0.9 % SODIUM CHLORIDE 30/500 ML
100-340 PLASTIC BAG, INJECTION (ML) INTRAVENOUS CONTINUOUS PRN
Status: DISCONTINUED | OUTPATIENT
Start: 2022-03-04 | End: 2022-03-06 | Stop reason: HOSPADM

## 2022-03-04 RX ORDER — MISOPROSTOL 200 UG/1
400 TABLET ORAL
Status: DISCONTINUED | OUTPATIENT
Start: 2022-03-04 | End: 2022-03-05 | Stop reason: HOSPADM

## 2022-03-04 RX ORDER — METOCLOPRAMIDE HYDROCHLORIDE 5 MG/ML
10 INJECTION INTRAMUSCULAR; INTRAVENOUS EVERY 6 HOURS PRN
Status: DISCONTINUED | OUTPATIENT
Start: 2022-03-04 | End: 2022-03-05 | Stop reason: HOSPADM

## 2022-03-04 RX ORDER — OXYTOCIN/0.9 % SODIUM CHLORIDE 30/500 ML
340 PLASTIC BAG, INJECTION (ML) INTRAVENOUS CONTINUOUS PRN
Status: DISCONTINUED | OUTPATIENT
Start: 2022-03-04 | End: 2022-03-05 | Stop reason: HOSPADM

## 2022-03-04 RX ORDER — TRANEXAMIC ACID 10 MG/ML
1 INJECTION, SOLUTION INTRAVENOUS EVERY 30 MIN PRN
Status: DISCONTINUED | OUTPATIENT
Start: 2022-03-04 | End: 2022-03-05 | Stop reason: HOSPADM

## 2022-03-04 RX ORDER — PROCHLORPERAZINE MALEATE 10 MG
10 TABLET ORAL EVERY 6 HOURS PRN
Status: DISCONTINUED | OUTPATIENT
Start: 2022-03-04 | End: 2022-03-05 | Stop reason: HOSPADM

## 2022-03-04 RX ORDER — PROCHLORPERAZINE 25 MG
25 SUPPOSITORY, RECTAL RECTAL EVERY 12 HOURS PRN
Status: DISCONTINUED | OUTPATIENT
Start: 2022-03-04 | End: 2022-03-05 | Stop reason: HOSPADM

## 2022-03-04 RX ORDER — METOCLOPRAMIDE 10 MG/1
10 TABLET ORAL EVERY 6 HOURS PRN
Status: DISCONTINUED | OUTPATIENT
Start: 2022-03-04 | End: 2022-03-05 | Stop reason: HOSPADM

## 2022-03-04 RX ORDER — OXYTOCIN 10 [USP'U]/ML
10 INJECTION, SOLUTION INTRAMUSCULAR; INTRAVENOUS
Status: DISCONTINUED | OUTPATIENT
Start: 2022-03-04 | End: 2022-03-06 | Stop reason: HOSPADM

## 2022-03-04 RX ORDER — ONDANSETRON 2 MG/ML
4 INJECTION INTRAMUSCULAR; INTRAVENOUS EVERY 6 HOURS PRN
Status: DISCONTINUED | OUTPATIENT
Start: 2022-03-04 | End: 2022-03-05 | Stop reason: HOSPADM

## 2022-03-04 RX ADMIN — SODIUM CHLORIDE, POTASSIUM CHLORIDE, SODIUM LACTATE AND CALCIUM CHLORIDE 1000 ML: 600; 310; 30; 20 INJECTION, SOLUTION INTRAVENOUS at 23:55

## 2022-03-04 RX ADMIN — FENTANYL CITRATE 100 MCG: 50 INJECTION INTRAMUSCULAR; INTRAVENOUS at 23:54

## 2022-03-05 ENCOUNTER — ANESTHESIA EVENT (OUTPATIENT)
Dept: OBGYN | Facility: CLINIC | Age: 32
End: 2022-03-05
Payer: COMMERCIAL

## 2022-03-05 ENCOUNTER — ANESTHESIA (OUTPATIENT)
Dept: OBGYN | Facility: CLINIC | Age: 32
End: 2022-03-05
Payer: COMMERCIAL

## 2022-03-05 LAB
AMPHETAMINES UR QL SCN: NORMAL
CANNABINOIDS UR QL SCN: NORMAL
COCAINE UR QL: NORMAL
HGB BLD-MCNC: 11.2 G/DL (ref 11.7–15.7)
HOLD SPECIMEN: NORMAL
OPIATES UR QL SCN: NORMAL
PCP UR QL SCN: NORMAL
SARS-COV-2 RNA RESP QL NAA+PROBE: POSITIVE
T PALLIDUM AB SER QL: NONREACTIVE

## 2022-03-05 PROCEDURE — 00HU33Z INSERTION OF INFUSION DEVICE INTO SPINAL CANAL, PERCUTANEOUS APPROACH: ICD-10-PCS | Performed by: ANESTHESIOLOGY

## 2022-03-05 PROCEDURE — 250N000011 HC RX IP 250 OP 636: Performed by: ANESTHESIOLOGY

## 2022-03-05 PROCEDURE — 250N000009 HC RX 250: Performed by: ADVANCED PRACTICE MIDWIFE

## 2022-03-05 PROCEDURE — 258N000003 HC RX IP 258 OP 636

## 2022-03-05 PROCEDURE — 80307 DRUG TEST PRSMV CHEM ANLYZR: CPT | Performed by: ADVANCED PRACTICE MIDWIFE

## 2022-03-05 PROCEDURE — 250N000011 HC RX IP 250 OP 636

## 2022-03-05 PROCEDURE — 722N000001 HC LABOR CARE VAGINAL DELIVERY SINGLE

## 2022-03-05 PROCEDURE — 258N000003 HC RX IP 258 OP 636: Performed by: ADVANCED PRACTICE MIDWIFE

## 2022-03-05 PROCEDURE — 250N000013 HC RX MED GY IP 250 OP 250 PS 637: Performed by: ADVANCED PRACTICE MIDWIFE

## 2022-03-05 PROCEDURE — 88307 TISSUE EXAM BY PATHOLOGIST: CPT | Mod: 26

## 2022-03-05 PROCEDURE — 370N000003 HC ANESTHESIA WARD SERVICE

## 2022-03-05 PROCEDURE — 88307 TISSUE EXAM BY PATHOLOGIST: CPT | Mod: TC | Performed by: ADVANCED PRACTICE MIDWIFE

## 2022-03-05 PROCEDURE — 120N000001 HC R&B MED SURG/OB

## 2022-03-05 PROCEDURE — 59410 OBSTETRICAL CARE: CPT | Performed by: ADVANCED PRACTICE MIDWIFE

## 2022-03-05 PROCEDURE — 3E0R3BZ INTRODUCTION OF ANESTHETIC AGENT INTO SPINAL CANAL, PERCUTANEOUS APPROACH: ICD-10-PCS | Performed by: ANESTHESIOLOGY

## 2022-03-05 PROCEDURE — 250N000011 HC RX IP 250 OP 636: Performed by: ADVANCED PRACTICE MIDWIFE

## 2022-03-05 RX ORDER — TRANEXAMIC ACID 10 MG/ML
1 INJECTION, SOLUTION INTRAVENOUS EVERY 30 MIN PRN
Status: DISCONTINUED | OUTPATIENT
Start: 2022-03-05 | End: 2022-03-06 | Stop reason: HOSPADM

## 2022-03-05 RX ORDER — METHYLERGONOVINE MALEATE 0.2 MG/ML
200 INJECTION INTRAVENOUS
Status: DISCONTINUED | OUTPATIENT
Start: 2022-03-05 | End: 2022-03-05

## 2022-03-05 RX ORDER — CARBOPROST TROMETHAMINE 250 UG/ML
250 INJECTION, SOLUTION INTRAMUSCULAR
Status: DISCONTINUED | OUTPATIENT
Start: 2022-03-05 | End: 2022-03-06 | Stop reason: HOSPADM

## 2022-03-05 RX ORDER — OXYTOCIN/0.9 % SODIUM CHLORIDE 30/500 ML
340 PLASTIC BAG, INJECTION (ML) INTRAVENOUS CONTINUOUS PRN
Status: DISCONTINUED | OUTPATIENT
Start: 2022-03-05 | End: 2022-03-06 | Stop reason: HOSPADM

## 2022-03-05 RX ORDER — IBUPROFEN 800 MG/1
800 TABLET, FILM COATED ORAL EVERY 6 HOURS PRN
Status: DISCONTINUED | OUTPATIENT
Start: 2022-03-05 | End: 2022-03-06 | Stop reason: HOSPADM

## 2022-03-05 RX ORDER — BUPIVACAINE HYDROCHLORIDE 2.5 MG/ML
15 INJECTION, SOLUTION EPIDURAL; INFILTRATION; INTRACAUDAL ONCE
Status: DISCONTINUED | OUTPATIENT
Start: 2022-03-05 | End: 2022-03-05 | Stop reason: HOSPADM

## 2022-03-05 RX ORDER — MODIFIED LANOLIN
OINTMENT (GRAM) TOPICAL
Status: DISCONTINUED | OUTPATIENT
Start: 2022-03-05 | End: 2022-03-05

## 2022-03-05 RX ORDER — NALBUPHINE HYDROCHLORIDE 10 MG/ML
2.5-5 INJECTION, SOLUTION INTRAMUSCULAR; INTRAVENOUS; SUBCUTANEOUS EVERY 6 HOURS PRN
Status: DISCONTINUED | OUTPATIENT
Start: 2022-03-05 | End: 2022-03-06 | Stop reason: HOSPADM

## 2022-03-05 RX ORDER — EPHEDRINE SULFATE 50 MG/ML
INJECTION, SOLUTION INTRAMUSCULAR; INTRAVENOUS; SUBCUTANEOUS
Status: DISCONTINUED
Start: 2022-03-05 | End: 2022-03-05 | Stop reason: HOSPADM

## 2022-03-05 RX ORDER — DOCUSATE SODIUM 100 MG/1
100 CAPSULE, LIQUID FILLED ORAL DAILY
Status: DISCONTINUED | OUTPATIENT
Start: 2022-03-05 | End: 2022-03-05

## 2022-03-05 RX ORDER — OXYTOCIN 10 [USP'U]/ML
10 INJECTION, SOLUTION INTRAMUSCULAR; INTRAVENOUS
Status: DISCONTINUED | OUTPATIENT
Start: 2022-03-05 | End: 2022-03-05

## 2022-03-05 RX ORDER — FENTANYL/BUPIVACAINE/NS/PF 2-1250MCG
PLASTIC BAG, INJECTION (ML) INJECTION
Status: COMPLETED
Start: 2022-03-05 | End: 2022-03-05

## 2022-03-05 RX ORDER — EPHEDRINE SULFATE 50 MG/ML
5 INJECTION, SOLUTION INTRAMUSCULAR; INTRAVENOUS; SUBCUTANEOUS
Status: DISCONTINUED | OUTPATIENT
Start: 2022-03-05 | End: 2022-03-05 | Stop reason: HOSPADM

## 2022-03-05 RX ORDER — BISACODYL 10 MG
10 SUPPOSITORY, RECTAL RECTAL DAILY PRN
Status: DISCONTINUED | OUTPATIENT
Start: 2022-03-05 | End: 2022-03-06 | Stop reason: HOSPADM

## 2022-03-05 RX ORDER — ACETAMINOPHEN 325 MG/1
650 TABLET ORAL EVERY 4 HOURS PRN
Status: DISCONTINUED | OUTPATIENT
Start: 2022-03-05 | End: 2022-03-05

## 2022-03-05 RX ORDER — BISACODYL 10 MG
10 SUPPOSITORY, RECTAL RECTAL DAILY PRN
Status: DISCONTINUED | OUTPATIENT
Start: 2022-03-05 | End: 2022-03-05

## 2022-03-05 RX ORDER — MISOPROSTOL 200 UG/1
400 TABLET ORAL
Status: DISCONTINUED | OUTPATIENT
Start: 2022-03-05 | End: 2022-03-06 | Stop reason: HOSPADM

## 2022-03-05 RX ORDER — BUPIVACAINE HYDROCHLORIDE 2.5 MG/ML
INJECTION, SOLUTION EPIDURAL; INFILTRATION; INTRACAUDAL PRN
Status: DISCONTINUED | OUTPATIENT
Start: 2022-03-05 | End: 2022-03-05

## 2022-03-05 RX ORDER — HYDROCORTISONE 2.5 %
CREAM (GRAM) TOPICAL 3 TIMES DAILY PRN
Status: DISCONTINUED | OUTPATIENT
Start: 2022-03-05 | End: 2022-03-06 | Stop reason: HOSPADM

## 2022-03-05 RX ORDER — DOCUSATE SODIUM 100 MG/1
100 CAPSULE, LIQUID FILLED ORAL DAILY
Status: DISCONTINUED | OUTPATIENT
Start: 2022-03-05 | End: 2022-03-06 | Stop reason: HOSPADM

## 2022-03-05 RX ORDER — MISOPROSTOL 200 UG/1
800 TABLET ORAL
Status: DISCONTINUED | OUTPATIENT
Start: 2022-03-05 | End: 2022-03-06 | Stop reason: HOSPADM

## 2022-03-05 RX ADMIN — FENTANYL CITRATE: 0.05 INJECTION, SOLUTION INTRAMUSCULAR; INTRAVENOUS at 00:42

## 2022-03-05 RX ADMIN — IBUPROFEN 800 MG: 800 TABLET, FILM COATED ORAL at 17:37

## 2022-03-05 RX ADMIN — BUPIVACAINE HYDROCHLORIDE 15 ML: 2.5 INJECTION, SOLUTION EPIDURAL; INFILTRATION; INTRACAUDAL at 00:15

## 2022-03-05 RX ADMIN — Medication: at 00:42

## 2022-03-05 RX ADMIN — KETOROLAC TROMETHAMINE 30 MG: 30 INJECTION, SOLUTION INTRAMUSCULAR; INTRAVENOUS at 01:40

## 2022-03-05 RX ADMIN — SODIUM CHLORIDE, POTASSIUM CHLORIDE, SODIUM LACTATE AND CALCIUM CHLORIDE: 600; 310; 30; 20 INJECTION, SOLUTION INTRAVENOUS at 00:41

## 2022-03-05 RX ADMIN — Medication 340 ML/HR: at 01:13

## 2022-03-05 ASSESSMENT — ACTIVITIES OF DAILY LIVING (ADL)
ADLS_ACUITY_SCORE: 5
ADLS_ACUITY_SCORE: 3
ADLS_ACUITY_SCORE: 5
ADLS_ACUITY_SCORE: 3
ADLS_ACUITY_SCORE: 5
ADLS_ACUITY_SCORE: 3
ADLS_ACUITY_SCORE: 5
ADLS_ACUITY_SCORE: 3
ADLS_ACUITY_SCORE: 5
ADLS_ACUITY_SCORE: 5
ADLS_ACUITY_SCORE: 3
ADLS_ACUITY_SCORE: 5
ADLS_ACUITY_SCORE: 5

## 2022-03-05 NOTE — PROVIDER NOTIFICATION
22 2350   Provider Notification   Provider Name/Title Bianca Darling CNM   Method of Notification At Bedside   Request Evaluate in Person   AMELIE Valenzuela at bedside to evaluate.  at 39.4 wks. Patient here grossly ruptured with clear fluid, ruptured at 2130. Patient reports contractions started at 2230. CNM reviewed FHT and contraction pattern. Patient is declining cervical examination at this time. Patient is not coping well and requesting an epidural. IV site being placed. AMELIE Valenzuela ok with IV fentanyl at this time for pain management. POC discussed with patient and FOB. AMELIE Valenzuela will remain on the unit and readily available.

## 2022-03-05 NOTE — PROVIDER NOTIFICATION
03/05/22 0029   Provider Notification   Provider Name/Title AMELIE Valenzuela   Method of Notification At Bedside   Request Evaluate in Person   AMELIE Valenzuela at bedside to evaluate. Patient reporting relief after epidural placement with intermittent pressure reported. SVE 7/90/0 with lots of bloody show present. Patient repositioned. FHT and contraction pattern reviewed by AMELIE Valenzuela. AMELIE remained in the room as labor is progressing rapidly.    0045: AMELIE Valenzuela made aware of severe range blood pressure 161/90, will recheck BP in 15 min.  0053: Patient reporting increasing vaginal pressure. SVE 9.5 cm. Patient getting set-up in [facility], anticipate delivery soon.

## 2022-03-05 NOTE — ANESTHESIA PROCEDURE NOTES
Epidural catheter Procedure Note    Pre-Procedure   Staff -        Anesthesiologist:  Petey Steward MD       Performed By: anesthesiologist       Location: OB  Procedure DocumentationProcedure: epidural catheter   Comments:  Pt seen and interviewed prior to epidural placement for labor analgesia.  This epidural is to be placed in anticipation of normal vaginal delivery.  Risk discussed and consent given prior to performance of procedure.  Time out consisting of identification of patient and desire for epidural analgesia was confirmed.  Sterile prep of lumbar spine was accomplished with povidone iodine three times.  L34 interspace was identified.  Local anesthetic infiltration with 1.5% lido with epi 1/200k was performed with 1.5 cc.  17 G Tuohy needle was advanced in the midline with loss of resistance technique.  No CSF, blood, or paresthesias were noted with placement.  Test dose of 1.5% Lidocaine with epi 1/200k, 3 cc., was injected without evidence of intrathecal or intravascular injection.  Incremental bolus of 0.25% Bupivicaine was injected to a total volume of 15 cc.  Epidural cath 19 G was advanced through needle approx. 6 cm.  No paresthesia was noted with placement and aspiration of cath was negative for blood.  Catheter secured with tegaderm and tape.  Epidural infusion begun after double check of pump settings.  Nursing to inform if there are any complications, but none were noted prior to leaving patient room.  I, or my partners, remain immediately available for management of any issues or complications.  I, or my partners, will monitor at appropriate intervals.  DALLIN Steward MD

## 2022-03-05 NOTE — PLAN OF CARE
Patient informed of need for urine tox due to unexplained precipitous delivery (less than 3 hours).  Verbal consent obtained and maternal urine sent. Umbilical cord segment will be sent for toxicology.

## 2022-03-05 NOTE — H&P
BILL Labor Admission History & Physical    Daniel Aguilar is a 31 year old  with an IUP at 39w4d  ; ,   Partner/support Person: Silviano  Language Barrier: English  Clinic: [facility]  Provider: SWAPNIL Snell CNM      Daniel Aguilar is admitted to the Birthplace at [facility] on 3/5/2022 at 12:09 AM       History of present inllness/Chief Complaint:  Here with: spontaneous onset of labor and spontaneous rupture of membranes  Patient reports contractions are Regular           Baby active Yes  Membranes are grossly ruptured since 2130 3/5/2022  Bloody show Yes   Any changes with medical history since last prenatal visit No      Obstetrical history  Estimated Date of Delivery: Mar 8, 2022 determined by LMP and cw US  Patient's last menstrual period was 2021.   Dating U/S: 21    Fetal anatomic survey: Normal  Placenta: L lateral and not low lying    PRENATAL COURSE  Prenatal care began at 10w 1d gestation for a total of 14 prenatal visits.   Body mass index is 25.82 kg/m .  Prenatal Blood Pressure: WNL  Prenatal course was essentially uncomplicated  Tdap: 21  Rhogam: N/A    Patient Active Problem List   Diagnosis     Subclinical hyperthyroidism     Low ferritin     Papanicolaou smear of cervix with low grade squamous intraepithelial lesion (LGSIL)     Supervision of high risk pregnancy, antepartum     B12 deficiency     Migraine     History of pre-eclampsia     Urinary tract infection in mother during pregnancy, antepartum     Anemia, iron deficiency     Muscle weakness (generalized)     Female stress incontinence     Indication for care in labor or delivery       HISTORY  No Known Allergies  Past Medical History:   Diagnosis Date     Abnormal Pap smear of cervix 2018    8/10/21     Anemia during pregnancy in third trimester 01/15/2021    Oral iron started at 28w  Fe transfusions started (last scheduled 2021)  ____Recheck at 36 weeks     Depressive disorder      "anxiety-well managed, no meds     Mild preeclampsia delivered 2021     Past Surgical History:   Procedure Laterality Date     D & C  2018    missed AB     SEPTOPLASTY  2004     TONSILLECTOMY & ADENOIDECTOMY  2004     Family History   Problem Relation Age of Onset     Colon Cancer Maternal Grandfather      Colon Cancer Maternal Aunt      Social History     Tobacco Use     Smoking status: Never Smoker     Smokeless tobacco: Never Used   Substance Use Topics     Alcohol use: Not Currently     OB History    Para Term  AB Living   4 1 1 0 2 1   SAB IAB Ectopic Multiple Live Births   0 0 0 0 1      # Outcome Date GA Lbr Octavio/2nd Weight Sex Delivery Anes PTL Lv   4 Current            3 Term 21 39w3d 05:00 / 02:13 2.93 kg (6 lb 7.4 oz) F Vag-Spont EPI, IV N HÉCTOR      Name: Jm      Apgar1: 9  Apgar5: 9   2 AB 20 7w0d          1 AB 18 6w0d              LABS:  Lab Results   Component Value Date    ABO A 2021    RH Pos 2021    AS Negative 2021    HGB 10.3 (L) 2022    HEPBANG Nonreactive 2021    CHPCRT Negative 2021    GCPCRT Negative 2021       GBS Status:   Lab Results   Component Value Date    GBS Negative 2021     Rubella: Immune    HIV: Non-Reactive   Platelets:  135  1hr GCT:  neg    ROS   Pt is alert and oriented  Pt denies significant constitutional symptoms including fever and/or malaise.    Pt denies significant respiratory, cardiovacular, GI, or muscular/skeletal complaints.    Neuro: Denies HA and visual changes  Muscoloskeletal: Denies except for discomforts r/t pregnancy     PHYSICAL EXAM:  Ht 1.676 m (5' 6\")   Wt 72.6 kg (160 lb)   LMP 2021   BMI 25.82 kg/m    General appearance:  healthy, alert, active, mild distress and coping well   Heart: RRR  Lungs: CTA bilaterally, normal respiratory effort  Abdomen: gravid, single vertex fetus, non-tender, EFW 6.5 lbs.   Legs: reflexes 2+ bilaterally, no clonus, no edema "     Contractions: Pt is john every 2-3 minutes, lasting 60-90 seconds and palpates moderate    Fetal heart tones: Baseline 145   Variability: moderate   Accelerations: present  Decelerations: absent    NST: reactive    Cervix: 7/ 90% / Anterior/ soft/ 0, Vtx  Bloody show: yes, normal  Membranes:  Ruptured clear    ASSESSMENT:  31 year old  with barroso IUP 39w4d in transition labor  NST reactive  GBS negative and membranes ruptured for 2.5 hours    PLAN:  Routine CNM care  Labs ordered: Hemoglobin and type and screen  Teaching done r/t comfort measures, pain management options, and labor processes  Admit - see IP orders  Pain medication - epidural  Anticipate   Reassess in 30 minutes    SWAPNIL Snell CNM

## 2022-03-05 NOTE — ANESTHESIA PREPROCEDURE EVALUATION
Anesthesia Pre-Procedure Evaluation    Patient: Daniel Aguilar   MRN: [id number] : 1990        Procedure : * No procedures listed *          Past Medical History:   Diagnosis Date     Abnormal Pap smear of cervix 2018    8/10/21     Anemia during pregnancy in third trimester 01/15/2021    Oral iron started at 28w  Fe transfusions started (last scheduled 2021)  ____Recheck at 36 weeks     Depressive disorder     anxiety-well managed, no meds     Mild preeclampsia delivered 2021      Past Surgical History:   Procedure Laterality Date     D & C  2018    missed AB     SEPTOPLASTY  2004     TONSILLECTOMY & ADENOIDECTOMY  2004      No Known Allergies   Social History     Tobacco Use     Smoking status: Never Smoker     Smokeless tobacco: Never Used   Substance Use Topics     Alcohol use: Not Currently      Wt Readings from Last 1 Encounters:   22 72.6 kg (160 lb)        Anesthesia Evaluation   Pt has had prior anesthetic.     No history of anesthetic complications       ROS/MED HX  ENT/Pulmonary:  - neg pulmonary ROS     Neurologic:  - neg neurologic ROS     Cardiovascular:  - neg cardiovascular ROS     METS/Exercise Tolerance:     Hematologic:  - neg hematologic  ROS     Musculoskeletal:       GI/Hepatic:  - neg GI/hepatic ROS     Renal/Genitourinary:       Endo:     (+) thyroid problem,  hyperthyroidism,     Psychiatric/Substance Use:  - neg psychiatric ROS     Infectious Disease:       Malignancy:       Other:     (-) previous  and TOLAC candidate       Physical Exam    Airway        Mallampati: II   TM distance: > 3 FB   Neck ROM: full   Mouth opening: > 3 cm    Respiratory Devices and Support         Dental  no notable dental history         Cardiovascular   cardiovascular exam normal          Pulmonary   pulmonary exam normal            Other findings: Lab Test        22                       1443          1207           1217          1139          1139          1322          WBC           --           --          5.0           --          6.5          6.3           HGB          10.3*        10.0*        10.1*          < >        12.6         12.0          MCV           --           --          95            --          90           92            PLT           --           --          135*          --          191          173            < > = values in this interval not displayed.                  Lab Test        08/11/21 08/22/20                       0921          1435          NA            --          138           POTASSIUM     --          3.3*          CHLORIDE      --          108           CO2           --          23            BUN           --          9             CR           0.66         0.66          ANIONGAP      --          7             OLGA LIDIA           --          8.5           GLC           --          97                     OUTSIDE LABS:  CBC:   Lab Results   Component Value Date    WBC 5.0 12/21/2021    WBC 6.5 07/28/2021    HGB 10.3 (L) 02/22/2022    HGB 10.0 (L) 01/17/2022    HCT 31.0 (L) 12/21/2021    HCT 37.5 07/28/2021     (L) 12/21/2021     07/28/2021     BMP:   Lab Results   Component Value Date     08/22/2020    POTASSIUM 3.3 (L) 08/22/2020    CHLORIDE 108 08/22/2020    CO2 23 08/22/2020    BUN 9 08/22/2020    CR 0.66 08/11/2021    CR 0.66 08/22/2020    GLC 97 08/22/2020     COAGS: No results found for: PTT, INR, FIBR  POC: No results found for: BGM, HCG, HCGS  HEPATIC:   Lab Results   Component Value Date    ALT 11 08/11/2021    AST 6 08/11/2021     OTHER:   Lab Results   Component Value Date    OLGA LIDIA 8.5 08/22/2020    TSH 0.17 (L) 12/01/2021    T4 1.12 12/01/2021       Anesthesia Plan    ASA Status:  2      Anesthesia Type: Epidural.              Consents    Anesthesia Plan(s) and associated risks, benefits, and realistic alternatives discussed. Questions answered and  patient/representative(s) expressed understanding.    - Discussed:     - Discussed with:  Patient         Postoperative Care            Comments:           neg OB ROS.       Petey Steward MD

## 2022-03-05 NOTE — TELEPHONE ENCOUNTER
"OB Triage Call    Is patient's OB/Midwife with the [facility] or [facility]? [facility]- Proceed with triage     Reason for call:  Fluid Leakage     Assessment: Pt called stating she started having clear fluid leakage half an hour ago that is good amount. Pt denied having contractions or bleeding.      Plan: RN paged on call AMELIE Gayy via answering service, and noticed provider called pt back in the chart.    Patient plans to deliver at [facility]     Patient's primary OB Provider is     Per protocol recommendations: On call CNM paged a nd she called pt  back.           39w3d    Estimated Date of Delivery: Mar 8, 2022        OB History    Para Term  AB Living   4 1 1 0 2 1   SAB IAB Ectopic Multiple Live Births   0 0 0 0 1      # Outcome Date GA Lbr Octavio/2nd Weight Sex Delivery Anes PTL Lv   4 Current            3 Term 21 39w3d 05:00 / 02:13 2.93 kg (6 lb 7.4 oz) F Vag-Spont EPI, IV N HÉCTOR      Name: Jm      Apgar1: 9  Apgar5: 9   2 AB 20 7w0d          1 AB 18 6w0d              Lab Results   Component Value Date    GBS Negative 2021          Salomon Jordan RN 22 10:09 PM  [facility] Nurse Advisor        Reason for Disposition    Leakage of fluid from vagina    Additional Information    Negative: [1] SEVERE abdominal pain (e.g., excruciating) AND [2] constant AND [3] present > 1 hour    Negative: Severe bleeding (e.g., continuous red blood from vagina, or large blood clots)    Negative: Umbilical cord hanging out of the vagina (shiny, white, curled appearance, \"like telephone cord\")    Negative: Uncontrollable urge to push (i.e., feels like baby is coming out now)    Negative: Can see baby    Negative: Sounds like a life-threatening emergency to the triager    Negative: < 20 weeks pregnant    Negative: Vaginal bleeding    Protocols used: PREGNANCY - RUPTURE OF [id number]      "

## 2022-03-05 NOTE — PLAN OF CARE
Patient meeting expected outcomes. Infant has fed well at the breast. Pain well managed with ibuprofen. Due to void. Able to ambulate. SO at bedside and supportive. Covid +, asymptomatic.

## 2022-03-05 NOTE — L&D DELIVERY NOTE
OB Vaginal Delivery Note    Daniel Aguilar MRN# [id number]   Age: 31 year old YOB: 1990       GA: 39w4d  GP:   Labor Complications:    EBL: 50  mL  Delivery QBL: 50 mL  Delivery Type: Vaginal, Spontaneous   ROM to Delivery Time: (Delivered) Hours: 3 Minutes: 38  Clayton Weight:     1 Minute 5 Minute 10 Minute   Apgar Totals: 8   9        MISBAH VINSON;ELIF LAKE;RADHA SANDRA     Delivery Details:  Daniel Aguilar, a 31 year old  female delivered a viable infant with apgars of 8  and 9 . Patient was fully dilated and pushing after 3 hours 38 minutes in active labor. Delivery was via vaginal, spontaneous  to a sterile field under epidural  anesthesia. Infant delivered in vertex  right  occiput  anterior  position. Anterior and posterior shoulders delivered without difficulty. The cord was clamped, cut twice and 3 vessels  were noted. Cord blood was obtained in routine fashion with the following disposition: lab .      Cord complications: none   Placenta delivered at 3/5/2022  1:26 AM . Placental disposition was Pathology . Fundal massage performed and fundus found to be firm.     Episiotomy: none    Perineum, vagina, cervix were inspected, and the following lacerations were noted:   Perineal lacerations: none                  Excellent hemostasis was noted. Needle count correct. Infant and patient in delivery room in good and stable condition.        Jeff Female-Daniel [[id number]]    Labor Event Times    Labor onset date: 3/4/22 Onset time: 10:30 PM   Dilation complete date: 3/5/22 Complete time: 12:57 AM   Start pushing date/time: 3/5/2022 0057      Rupture date/time: 3/4/22 2130   Rupture type: Spontaneous rupture of membranes occuring during spontaneous labor or augmentation  Fluid color: Clear  Fluid odor: Normal     Augmentation: None     Delivery/Placenta Date and Time    Delivery Date: 3/5/22 Delivery Time:  1:08 AM   Placenta Date/Time: 3/5/2022  1:26 AM  Oxytocin  given at the time of delivery: after delivery of baby  Delivering clinician: Bianca Darling APRN CNM   Other personnel present at delivery:  Provider Role   Tamar Vinson RN Delivery Nurse   Vandana Forrest RN Delivery Assist   Bianca Darling APRN CNM Certified Nurse Midwife         Vaginal Counts     Initial count performed by 2 team members:  Two Team Members   AMELIE MAYA RN       Needles Suture Needles Sponges (RETIRED) Instruments   Initial counts 2  5    Added to count       Relief counts       Final counts 2  5          Placed during labor Accounted for at the end of labor   FSE No NA   IUPC No NA   Cervadil No NA              Final count performed by 2 team members:  Two Team Members   AMELIE Snell RN      Final count correct?: Yes     Apgars    Living status: Living   1 Minute 5 Minute 10 Minute 15 Minute 20 Minute   Skin color: 0  1       Heart rate: 2  2       Reflex irritability: 2  2       Muscle tone: 2  2       Respiratory effort: 2  2       Total: 8  9       Apgars assigned by: TAMAR VINSON RN     Cord    Vessels: 3 Vessels    Cord Complications: None               Cord Blood Disposition: Lab    Gases Sent?: No    Delayed cord clamping?: Yes    Cord Clamping Delay (seconds): >120 seconds    Stem cell collection?: No       Skin to Skin and Feeding Plan    Skin to skin initiation date/time: 1/3/1841    Skin to skin with: Mother  Skin to skin end date/time:        Labor Events and Shoulder Dystocia    Fetal Tracing Prior to Delivery: Category 2  Fetal Tracing Comments: Variable and late decelerations with pushing, Cat 1 prior to pushing     Delivery (Maternal) (Provider to Complete) ([id number])    Episiotomy: None  Perineal lacerations: None    Est. blood loss (mL): 50  Repair suture: None  Genital tract inspection done: Pos     Blood Loss  Mother: Stuart Aguilar N #[id number]   Start of Mother's Information    Delivery Blood Loss  03/04/22 2230 - 03/05/22 0157    EBL (mL) Hospital  Encounter 50 mL    Delivery QBL (mL) Hospital Encounter 50 mL    Total  100 mL         End of Mother's Information  Mother: Stuart Aguilar N #[id number]          Delivery - Provider to Complete ([id number])    Delivering clinician: Bianca Darling APRN CNM CNM Care: Any CNM care in labor  Attempted Delivery Types (Choose all that apply): Spontaneous Vaginal Delivery  Delivery Type (Choose the 1 that will go to the Birth History): Vaginal, Spontaneous                   Other personnel:  Provider Role   Tamar Laird, RN Delivery Nurse   Vandana Forrest RN Delivery Assist   Bianca Darling APRN CNM Certified Nurse Midwife                Placenta    Date/Time: 3/5/2022  1:26 AM  Removal: Expressed  Disposition: Pathology           Anesthesia    Method: Epidural  Cervical dilation at placement: 4-7                Presentation and Position    Presentation: Vertex    Position: Right Occiput Anterior                 SWAPNIL Snell CNM

## 2022-03-05 NOTE — PLAN OF CARE
Data: Daniel Aguilar transferred to 454 via wheelchair at 0322. Baby transferred via parent's arms.  Action: Receiving unit notified of transfer: Yes. Patient and family notified of room change. Nurse continued care after transfer. Belongings sent to receiving unit. Accompanied by Registered Nurse. Oriented patient to surroundings. Call light within reach. ID bands double-checked with Beronica JARVIS.  Response: Patient tolerated transfer and is stable.

## 2022-03-05 NOTE — TELEPHONE ENCOUNTER
Daniel GARLAND Jeff 31 year old  39w3d. GBS neg  Pt called with leaking of clear fluid x1 hour, is continuous and soaking through pants.  Occasional ctxs, no bleeding, and no other complaints.   Will be in within the hour, needs to wait for sitter for child.    Pt to come to triage for SROM check.    SWAPNIL SnellM

## 2022-03-06 VITALS
OXYGEN SATURATION: 94 % | DIASTOLIC BLOOD PRESSURE: 91 MMHG | HEART RATE: 74 BPM | SYSTOLIC BLOOD PRESSURE: 127 MMHG | WEIGHT: 160 LBS | HEIGHT: 66 IN | RESPIRATION RATE: 16 BRPM | TEMPERATURE: 97.9 F | BODY MASS INDEX: 25.71 KG/M2

## 2022-03-06 LAB — HGB BLD-MCNC: 10.1 G/DL (ref 11.7–15.7)

## 2022-03-06 PROCEDURE — 36415 COLL VENOUS BLD VENIPUNCTURE: CPT | Performed by: ADVANCED PRACTICE MIDWIFE

## 2022-03-06 PROCEDURE — 250N000013 HC RX MED GY IP 250 OP 250 PS 637: Performed by: ADVANCED PRACTICE MIDWIFE

## 2022-03-06 PROCEDURE — 85018 HEMOGLOBIN: CPT | Performed by: ADVANCED PRACTICE MIDWIFE

## 2022-03-06 RX ORDER — IBUPROFEN 800 MG/1
800 TABLET, FILM COATED ORAL EVERY 6 HOURS PRN
Qty: 21 TABLET | Refills: 0 | COMMUNITY
Start: 2022-03-06 | End: 2022-03-18

## 2022-03-06 RX ADMIN — Medication 1 TABLET: at 08:23

## 2022-03-06 RX ADMIN — DOCUSATE SODIUM 100 MG: 100 CAPSULE, LIQUID FILLED ORAL at 08:23

## 2022-03-06 ASSESSMENT — ACTIVITIES OF DAILY LIVING (ADL)
ADLS_ACUITY_SCORE: 3
ADLS_ACUITY_SCORE: 5
ADLS_ACUITY_SCORE: 3
ADLS_ACUITY_SCORE: 5
ADLS_ACUITY_SCORE: 3
ADLS_ACUITY_SCORE: 5

## 2022-03-06 NOTE — PLAN OF CARE
Data: Vital signs within normal limits. Postpartum checks within normal limits - see flow record. Patient eating and drinking normally. Patient able to empty bladder independently and is up ambulating. No apparent signs of infection.  Patient performing self cares and is able to care for infant.  Breastfeeding well every 2-3 hours.  Action: Patient denies pain this shift, declines pain medications - patient stated she was comfortable. Patient education complete.  All discharge instructions reviewed with pt.  Pt states she has breast pump at home.  Medications OTC per midwife.    Response: Positive attachment behaviors observed with infant. Spouse at bedside and supportive.   Plan: Patient to self monitor BPs at home twice daily; pt has BP cuff at home.  Notify PCP if BP equal to or greater than 150/95 or preeclampsia signs.  Reviewed s/s of pre E with pt, verbalizes understanding.  Follow up appointment in 2 weeks and in 6 weeks for postpartum check up.  Discharged to home from unit at 1222 with infant.

## 2022-03-06 NOTE — DISCHARGE INSTRUCTIONS
Monitor Blood Pressures at home 2 times daily for at least 1 week and record readings.  Call Doctor/care provider with any readings equal to or greater than 150/95.  Call doctor with any signs of Preeclampsia (see lists below)  Make an appointment in 2 weeks and again in 6 weeks for postpartum check up.      Call your doctor right away if you have any of the following:  - Edema (swelling) in your face or hands  - Rapid weight gain-about 1 pound or more in a day  - Headache  - Abdominal pain on your right side  - Vision problems (flashes or spots)  - You have questions or concerns once you return home.      Postpartum Vaginal Delivery Instructions    Activity       Ask family and friends for help when you need it.    Do not place anything in your vagina for 6 weeks.    You are not restricted on other activities, but take it easy for a few weeks to allow your body to recover from delivery.  You are able to do any activities you feel up to that point.    No driving until you have stopped taking your pain medications (usually two weeks after delivery).     Call your health care provider if you have any of these symptoms:       Increased pain, swelling, redness, or fluid around your stiches from an episiotomy or perineal tear.    A fever above 100.4 F (38 C) with or without chills when placing a thermometer under your tongue.    You soak a sanitary pad with blood within 1 hour, or you see blood clots larger than a golf ball.    Bleeding that lasts more than 6 weeks.    Vaginal discharge that smells bad.    Severe pain, cramping or tenderness in your lower belly area.    A need to urinate more frequently (use the toilet more often), more urgently (use the toilet very quickly), or it burns when you urinate.    Nausea and vomiting.    Redness, swelling or pain around a vein in your leg.    Problems breastfeeding or a red or painful area on your breast.    Chest pain and cough or are gasping for air.    Problems coping with  sadness, anxiety, or depression.  If you have any concerns about hurting yourself or the baby, call your provider immediately.     You have questions or concerns after you return home.     Keep your hands clean:  Always wash your hands before touching your perineal area and stitches.  This helps reduce your risk of infection.  If your hands aren't dirty, you may use an alcohol hand-rub to clean your hands. Keep your nails clean and short.

## 2022-03-06 NOTE — DISCHARGE SUMMARY
[facility]    Discharge Summary  Obstetrics    Date of Admission:  3/4/2022  Date of Discharge:  3/6/2022  Discharging Provider: Bianca Darling  Date of Service (when I saw the patient): 22    Discharge Diagnoses       History of Present Illness   Daniel Aguilar is a 31 year old female who presented with SROM in active labor    Hospital Course   The patient's hospital course was notable for asymptomatic Covid-19 and continuing anemia.  She recovered as anticipated and experienced no post-delivery complications. On discharge, her pain was well controlled. Vaginal bleeding is similar to peak menstrual flow.  Voiding without difficulty.  Ambulating well and tolerating a normal diet.  No fevers.  Breastfeeding well.  Infant is stable.  She was discharged on post-partum day #1.    Post-partum hemoglobin:   Hemoglobin   Date Value Ref Range Status   2022 10.1 (L) 11.7 - 15.7 g/dL Final   2021 12.0 11.7 - 15.7 g/dL Final     Exam:  Constitutional: healthy, alert and no distress  Cardiovascular:  RRR   Respiratory: WNL  Gastrointestinal: Abdomen soft, non-tender. FF@ 3 below U  : Normal, lochia moderate  Psychiatric: mentation appears normal and affect normal/bright  Extremities: minimal edema; negative karina's sign    Discharge Disposition   Discharged to home   Condition at discharge: Stable    Primary Care Physician   Physician No Ref-Primary    Consultations This Hospital Stay   ANESTHESIOLOGY IP CONSULT  HOME CARE POST PARTUM/ IP CONSULT  LACTATION IP CONSULT  HOME CARE POST PARTUM/ IP CONSULT  LACTATION IP CONSULT    Discharge Orders      Activity    Activity as tolerated     Reason for your hospital stay    Maternity care     Follow Up    Follow up with provider in 2 weeks and 6 weeks for post-delivery checks     Breast pump - Manual/Electric    Breast Pump Documentation:  Manual/Electric Pump: To support adequate breast milk production and nutrition for  infant.     I, the undersigned, certify that the above prescribed supplies are medically necessary for this patient and is both reasonable and necessary in reference to accepted standards of medical and necessary in reference to accepted standards of medical practice in the treatment of this patient's condition and is not prescribed as a convenience.     Diet    Resume previous diet     Discharge Medications   Current Discharge Medication List      START taking these medications    Details   ibuprofen (ADVIL/MOTRIN) 800 MG tablet Take 1 tablet (800 mg) by mouth every 6 hours as needed for other (cramping)  Qty: 21 tablet, Refills: 0    Associated Diagnoses: Normal delivery         CONTINUE these medications which have NOT CHANGED    Details   Ascorbic Acid (VITAMIN C PO)       Cyanocobalamin (VITAMIN B-12 PO)       docusate sodium (COLACE) 100 MG capsule Take 1 capsule (100 mg) by mouth 2 times daily    Associated Diagnoses: Constipation, unspecified constipation type      ferrous gluconate (FERGON) 324 (38 Fe) MG tablet Take 1 tablet (324 mg) by mouth daily (with breakfast)  Qty: 90 tablet, Refills: 3    Comments: Please advise patient to take with Vitamin C juice.  Associated Diagnoses: Anemia in pregnancy, third trimester      hydrocortisone, Perianal, (HYDROCORTISONE) 2.5 % cream Place rectally 2 times daily as needed for hemorrhoids  Qty: 30 g, Refills: 0    Associated Diagnoses: External hemorrhoids      Prenatal Vit-Fe Fumarate-FA (PNV PRENATAL PLUS MULTIVITAMIN) 27-1 MG TABS per tablet Take 1 tablet by mouth daily         STOP taking these medications       aspirin (ASA) 81 MG EC tablet Comments:   Reason for Stopping:             Allergies   No Known Allergies     Plan:   Monitor BP BID - parameters to call includes anything over 150/95 or symptoms of Pre-E   Montior symptoms for Covid-19  Warning s/sx reviewed  Continue iron and vitamin C as ordered  Normal PP s/sx reviewed  CBC at 6 weeks  BCM: IUD at 6  weeks  Pt to follow-up in 2 and 6 weeks    SWAPNIL SnellM

## 2022-03-06 NOTE — PLAN OF CARE
Pt is up ad pal, and reports adequate pain control. Family is present and supportive. Pt is attentive to infants needs. Breastfeeding infant frequently. Meeting expected goals.

## 2022-03-06 NOTE — ANESTHESIA POSTPROCEDURE EVALUATION
Patient: Daniel GARLAND [place]    Procedure: * No procedures listed *       Anesthesia Type:  Epidural    Note:     Postop Pain Control:    PONV:    Neuro/Psych:    Airway/Respiratory:    CV/Hemodynamics:    Other NRE:    DID A NON-ROUTINE EVENT OCCUR?     Event details/Postop Comments:      S/P epidural for labor.   I or my partner was immediately available for management of this patient during epidural analgesia infusion.  VSS.  Doing well. Block resolved.  Neuro at baseline. Denies positional headache. Minimal side effects easily managed w/ PRN meds. No apparent anesthetic complications. No follow-up required.    Petey Steward MD           Last vitals:  Vitals Value Taken Time   BP     Temp     Pulse     Resp     SpO2         Electronically Signed By: Petey Steward MD  March 6, 2022  8:08 AM

## 2022-03-09 LAB
PATH REPORT.COMMENTS IMP SPEC: NORMAL
PATH REPORT.COMMENTS IMP SPEC: NORMAL
PATH REPORT.FINAL DX SPEC: NORMAL
PATH REPORT.GROSS SPEC: NORMAL
PATH REPORT.RELEVANT HX SPEC: NORMAL
PHOTO IMAGE: NORMAL

## 2022-03-18 ENCOUNTER — VIRTUAL VISIT (OUTPATIENT)
Dept: MIDWIFE SERVICES | Facility: CLINIC | Age: 32
End: 2022-03-18
Payer: COMMERCIAL

## 2022-03-18 PROCEDURE — 99024 POSTOP FOLLOW-UP VISIT: CPT | Performed by: ADVANCED PRACTICE MIDWIFE

## 2022-03-18 NOTE — LETTER
3/18/2022     Daniel Aguilar  [id number]  [facility] [address]      To whom it may concern:    Daniel Jeff can return to work on 3/21/2022.    Please let me know know if you need any further information.      Sincerely,    Stephanie Martínez, DNP, APRN, CNM  [facility] [address], [address]  [facility] [address]  Phone: [phone]  Fax: [phone]

## 2022-03-18 NOTE — NURSING NOTE
"Chief Complaint   Patient presents with     Postpartum Care      on 3/5/2022 of a girl     Contraception     Plans for  to get vasectomy later down the road. Would like to do a Mirena IUD insertion at 6 weeks postpartum visit       Initial LMP 2021   Breastfeeding Yes  Estimated body mass index is 25.82 kg/m  as calculated from the following:    Height as of 3/4/22: 1.676 m (5' 6\").    Weight as of 3/4/22: 72.6 kg (160 lb).  BP completed using cuff size: NA (Not Taken)    Questioned patient about current smoking habits.  Pt. has never smoked.          The following HM Due: NONE      "

## 2022-03-18 NOTE — PROGRESS NOTES
Daniel Aguilar is a 31 year old female who is being evaluated via a billable telephone visit.      What phone number would you like to be contacted at? [phone]  How would you like to obtain your AVS? Florencio    Midwife Postpartum 2 Week Visit    Daniel Aguilar is a 31 year old here for a 2 week postpartum checkup by phone.  She would like a note to return to working at home on 3/21.    Delivery date was 3/5/2022. She had a  of a viable girl, weight 6 pounds 5.2 oz., with Asymptomatic Covid complications      Since delivery, she has been breast feeding.  She has not had any signs of infection. Her lochia is now scant.  She has not had other complications.      She is voiding and having bowel movements without difficulty.       Contraception was discussed and patient desires vasectomy later and an Mirena IUD.   She  has not had intercourse since delivery.   She complains of No  perineal discomfort.     Mood is Stable  Patient screened for postpartum depression. Says she feels well.       ROS:  CONSTITUTIONAL: NEGATIVE for fever, chills, change in weight  EYES: NEGATIVE for vision changes or irritation  RESP: NEGATIVE for significant cough or SOB  BREAST: NEGATIVE for masses, tenderness or discharge  CV: NEGATIVE for chest pain, palpitations or peripheral edema  GI: NEGATIVE for nausea, abdominal pain, heartburn, or change in bowel habits  : NEGATIVE for unusual urinary or vaginal symptoms. Periods are regular.  MUSCULOSKELETAL: NEGATIVE for significant arthralgias or myalgia  PSYCHIATRIC: NEGATIVE for changes in mood or affect      Current Outpatient Medications:      Ascorbic Acid (VITAMIN C PO), , Disp: , Rfl:      Cyanocobalamin (VITAMIN B-12 PO), , Disp: , Rfl:      docusate sodium (COLACE) 100 MG capsule, Take 1 capsule (100 mg) by mouth 2 times daily, Disp: , Rfl:      ferrous gluconate (FERGON) 324 (38 Fe) MG tablet, Take 1 tablet (324 mg) by mouth daily (with breakfast), Disp: 90 tablet, Rfl: 3      hydrocortisone, Perianal, (HYDROCORTISONE) 2.5 % cream, Place rectally 2 times daily as needed for hemorrhoids, Disp: 30 g, Rfl: 0     ibuprofen (ADVIL/MOTRIN) 800 MG tablet, Take 1 tablet (800 mg) by mouth every 6 hours as needed for other (cramping), Disp: 21 tablet, Rfl: 0     Prenatal Vit-Fe Fumarate-FA (PNV PRENATAL PLUS MULTIVITAMIN) 27-1 MG TABS per tablet, Take 1 tablet by mouth daily, Disp: , Rfl: .   OB History    Para Term  AB Living   4 2 2 0 2 2   SAB IAB Ectopic Multiple Live Births   0 0 0 0 2      # Outcome Date GA Lbr Octavio/2nd Weight Sex Delivery Anes PTL Lv   4 Term 22 39w4d 02:27 / 00:10 2.87 kg (6 lb 5.2 oz) F Vag-Spont EPI  HÉCTOR      Complications: Precipitous delivery      Name: BEAR,FEMALE-DAVID      Apgar1: 8  Apgar5: 9   3 Term 21 39w3d 05:00 / 02:13 2.93 kg (6 lb 7.4 oz) F Vag-Spont EPI, IV N HÉCTOR      Name: Jm      Apgar1: 9  Apgar5: 9   2 AB 20 7w0d          1 AB 18 6w0d            Last pap:  No results found for: PAP  Hgb in hospital was 10.1    EXAM:  LMP 2021   BMI: There is no height or weight on file to calculate BMI.  Exam:  Constitutional: healthy, alert and no distress - sounds well.   Psychiatric: mentation appears normal and affect normal/bright    ASSESSMENT:   Normal postpartum exam after .    PLAN:  No results found for any visits on 22.    Return for 6 week postpartum visit.  Teaching: birth control  Family Planning:Mirena IUD  Encourage Kegels and abdominal exercise.  Continue a multivitamin/prenatal supplement, especially if breastfeeding.  Postpartum Hgb was not done today.

## 2022-04-15 ENCOUNTER — PRENATAL OFFICE VISIT (OUTPATIENT)
Dept: MIDWIFE SERVICES | Facility: CLINIC | Age: 32
End: 2022-04-15
Payer: COMMERCIAL

## 2022-04-15 VITALS — SYSTOLIC BLOOD PRESSURE: 106 MMHG | DIASTOLIC BLOOD PRESSURE: 72 MMHG | WEIGHT: 140.9 LBS | BODY MASS INDEX: 22.74 KG/M2

## 2022-04-15 DIAGNOSIS — Z30.430 ENCOUNTER FOR INSERTION OF INTRAUTERINE CONTRACEPTIVE DEVICE: Primary | ICD-10-CM

## 2022-04-15 PROBLEM — O23.40 URINARY TRACT INFECTION IN MOTHER DURING PREGNANCY, ANTEPARTUM: Status: RESOLVED | Noted: 2021-09-15 | Resolved: 2022-04-15

## 2022-04-15 PROBLEM — O09.90 SUPERVISION OF HIGH RISK PREGNANCY, ANTEPARTUM: Status: RESOLVED | Noted: 2021-02-04 | Resolved: 2022-04-15

## 2022-04-15 PROCEDURE — 99207 PR POST PARTUM EXAM: CPT | Performed by: ADVANCED PRACTICE MIDWIFE

## 2022-04-15 PROCEDURE — 58300 INSERT INTRAUTERINE DEVICE: CPT | Performed by: ADVANCED PRACTICE MIDWIFE

## 2022-04-15 NOTE — PROGRESS NOTES
Midwife Postpartum 6 Week Visit    Daniel Aguilar is a 31 year old here for a postpartum checkup.     Delivery date was 3/5/2022. She had a  of a viable girl, weight 6 pounds 5.2 oz., with Asymptomatic Covid complications       Since delivery, she has been breast feeding.  She has not had any signs of infection, her lochia stopped after 4 weeks.  She has not had other complications.      She is voiding and having bowel movements without difficulty.       Contraception was discussed and patient desires Mirena IUD.   She  has not had intercourse since delivery.   She complains of No  perineal discomfort.     Mood is Stable  Patient screened for postpartum depression.       ROS:  CONSTITUTIONAL: NEGATIVE for fever, chills, change in weight  INTEGUMENTARU/SKIN: NEGATIVE for worrisome rashes, moles or lesions  EYES: NEGATIVE for vision changes or irritation  ENT: NEGATIVE for ear, mouth and throat problems  RESP: NEGATIVE for significant cough or SOB  BREAST: NEGATIVE for masses, tenderness or discharge  CV: NEGATIVE for chest pain, palpitations or peripheral edema  GI: NEGATIVE for nausea, abdominal pain, heartburn, or change in bowel habits  : NEGATIVE for unusual urinary or vaginal symptoms. Periods are regular.  MUSCULOSKELETAL: NEGATIVE for significant arthralgias or myalgia  NEURO: NEGATIVE for weakness, dizziness or paresthesias  PSYCHIATRIC: NEGATIVE for changes in mood or affect      Current Outpatient Medications:      Ascorbic Acid (VITAMIN C PO), , Disp: , Rfl:      Cyanocobalamin (VITAMIN B-12 PO), , Disp: , Rfl:      ferrous gluconate (FERGON) 324 (38 Fe) MG tablet, Take 1 tablet (324 mg) by mouth daily (with breakfast), Disp: 90 tablet, Rfl: 3     hydrocortisone, Perianal, (HYDROCORTISONE) 2.5 % cream, Place rectally 2 times daily as needed for hemorrhoids, Disp: 30 g, Rfl: 0     Prenatal Vit-Fe Fumarate-FA (PNV PRENATAL PLUS MULTIVITAMIN) 27-1 MG TABS per tablet, Take 1 tablet by mouth daily, Disp:  , Rfl: .   OB History    Para Term  AB Living   4 2 2 0 2 2   SAB IAB Ectopic Multiple Live Births   0 0 0 0 2      # Outcome Date GA Lbr Octavio/2nd Weight Sex Delivery Anes PTL Lv   4 Term 22 39w4d 02:27 / 00:10 2.87 kg (6 lb 5.2 oz) F Vag-Spont EPI  HÉCTOR      Complications: Precipitous delivery      Name: BEAR,FEMALE-DAVID      Apgar1: 8  Apgar5: 9   3 Term 21 39w3d 05:00 / 02:13 2.93 kg (6 lb 7.4 oz) F Vag-Spont EPI, IV N HÉCTOR      Name: Jm      Apgar1: 9  Apgar5: 9   2 AB 20 7w0d          1 AB 18 6w0d                EXAM:  LMP 2021   Exam:  Constitutional: healthy, alert, no distress and cooperative  Head: Normocephalic. No masses, lesions, tenderness or abnormalities  Neck: Neck supple. No adenopathy. Thyroid symmetric, normal size,  ENT: ENT exam normal, no neck nodes or sinus tenderness  Cardiovascular: negative, PMI normal. No lifts, heaves, or thrills. RRR. No murmurs, clicks gallops or rub  Respiratory: negative, Percussion normal. Good diaphragmatic excursion. Lungs clear  Breasts: normal without suspicious masses, skin changes or axillary nodes. Lactating  Gastrointestinal: Abdomen soft, non-tender. BS normal. No masses, organomegaly    Musculoskeletal: extremities normal- no gross deformities noted, gait normal and normal muscle tone  Skin: no suspicious lesions or rashes  Neurologic: Gait normal. Reflexes normal and symmetric. Sensation grossly WNL.  Psychiatric: mentation appears normal and affect normal/bright  Hematologic/Lymphatic/Immunologic: Normal cervical lymph nodes  PELVIC EXAM:  Vulva: No lesions, well healed, BUS WNL, no tenderness  Vagina: Moist, pink, discharge normal  well rugated, no lesions  Cervix:smooth, pink, no visible lesions  Uterus: Involuted to normal size, non-tender, no masses palpated  Ovaries: No masses palpated  Pelvic tone: normal   Rectal exam: deferred      ASSESSMENT:   Normal postpartum exam after .    ICD-10-CM    1.  Encounter for insertion of intrauterine contraceptive device  Z30.430 levonorgestrel (MIRENA) 20 MCG/DAY IUD     levonorgestrel (MIRENA) 20 MCG/DAY IUD 20 mcg     INSERTION INTRAUTERINE DEVICE   2. Routine postpartum follow-up  Z39.2          PLAN:  No results found for any visits on 04/15/22.    Return as needed or at time of next expected pap, pelvic, or breast exam.  Teaching: birth control  Family Planning:Mirena IUD  Encourage Kegels and abdominal exercise.  Continue a multivitamin/prenatal supplement, especially if breastfeeding.  Pap smear was not obtained today.  Postpartum Hgb was not done today.  See Below for IUD placement        IUD Insertion:  CONSULT:    Is a pregnancy test required: No.  Has not been sexually active since birth.    Was a consent obtained?  Yes    Subjective: Daniel Aguilra is a 31 year old  presents for IUD and desires Mirena type IUD.    Patient has been given the opportunity to ask questions about all forms of birth control, including all options appropriate for Daniel Aguilar. Discussed that no method of birth control, except abstinence is 100% effective against pregnancy or sexually transmitted infection.     Daniel Aguilar understands she may have the IUD removed at any time. IUD should be removed by a health care provider.    The entire insertion procedure was reviewed with the patient, including care after placement.    Patient's last menstrual period was 2021. Last sexual activity: before childbirth . No allergy to betadine or shellfish. Recent STD screening      /72 (BP Location: Left arm, Cuff Size: Adult Regular)   Wt 63.9 kg (140 lb 14.4 oz)   LMP 2021   Breastfeeding Yes   BMI 22.74 kg/m      Pelvic Exam:   EG/BUS: normal genital architecture without lesions, erythema or abnormal secretions.   Vagina: moist, pink, rugae with physiologic discharge and secretions  Cervix: parous no lesions and pink, moist, closed, without lesion or  CMT  Uterus: mobile, no pain  Adnexa: within normal limits and no masses, nodularity, tenderness    PROCEDURE NOTE: -- IUD Insertion    Reason for Insertion: contraception    Under sterile technique, cervix was visualized with speculum and prepped with Betadine solution swab x 3. Tenaculum was placed for stability. The uterus was gently straightened and sounded to 7.0 cm. IUD prepared for placement, and IUD inserted according to 's instructions without difficulty or significant resitance, and deployed at the fundus. The strings were visualized and trimmed to 2.5 cm from the external os. Tenaculum was removed and hemostasis noted. Speculum removed.  Patient tolerated procedure well.    EBL: minimal    Complications: none    ASSESSMENT:     ICD-10-CM    1. Encounter for insertion of intrauterine contraceptive device  Z30.430 levonorgestrel (MIRENA) 20 MCG/DAY IUD     levonorgestrel (MIRENA) 20 MCG/DAY IUD 20 mcg     INSERTION INTRAUTERINE DEVICE   2. Routine postpartum follow-up  Z39.2         PLAN:    Given 's handouts, including when to have IUD removed, list of danger s/sx, side effects and follow up recommended. Encouraged condom use for prevention of STD. Back up contraception advised for 7 days if progestin method. Advised to call for any fever, for prolonged or severe pain or bleeding, abnormal vaginal discharge, or unable to palpate strings. She was advised to use pain medications (ibuprofen) as needed for mild to moderate pain. Advised to follow-up in clinic in 4-6 weeks for IUD string check if unable to find strings or as directed by provider.     Stephanie Martínez CNM

## 2022-07-25 PROBLEM — R87.612 PAPANICOLAOU SMEAR OF CERVIX WITH LOW GRADE SQUAMOUS INTRAEPITHELIAL LESION (LGSIL): Status: ACTIVE | Noted: 2018-05-18

## 2022-08-18 ENCOUNTER — E-VISIT (OUTPATIENT)
Dept: URGENT CARE | Facility: CLINIC | Age: 32
End: 2022-08-18
Payer: COMMERCIAL

## 2022-08-18 DIAGNOSIS — R05.9 COUGH: Primary | ICD-10-CM

## 2022-08-18 PROCEDURE — 99421 OL DIG E/M SVC 5-10 MIN: CPT | Performed by: EMERGENCY MEDICINE

## 2022-08-18 NOTE — PATIENT INSTRUCTIONS
"  Dear Daniel Aguilar    Be sure to test for COVID at home if you have not done so already.      Symptoms sound very much like this is a virus and you can take any nonprescription cough and cold medicine for your symptoms.  Be sure to be seen if your symptoms worsen.        Fter reviewing your responses, I've been able to diagnose you with \"Bronchitis\" which is a common infection of your lungs that is nearly always caused by a virus. The virus causes swelling and irritation of the air passages of your lungs which leads to cough. The illness spreads from your nose and throat to your windpipe and airways. It is often called a \"chest cold\" and can last up to 2 weeks, but is not a serious illness. Exposure to cigarette smoke usually makes this significantly worse.      To treat bronchitis, the main thing to do is drink lots of fluids and rest. Cough medications over-the-counter such as mucinex, robitussin or \"cold and sinus\" medications can be helpful. Ibuprofen and Tylenol also help with fevers or aching feelings that you often have with this kind of illness. Do not take ibuprofen if you have kidney disease, stomach ulcers or allergy to aspirin.     Bronchitis is most often highly contagious as viruses are spread through the air or touch. Avoid contact with others who may become infected, particularly children, the elderly and those whose immune systems might be weak.     If your symptoms worsen, you develop chest pain or shortness of breath, fevers over 101, or are not improving in 5 days, please contact your primary care provider for an appointment or visit any of our convenient Walk-in Care or Urgent Care Centers to be seen which can be found on our website here.    Thanks again for choosing us as your health care partner,    Petey Mcgrath MD  "

## 2022-08-29 ENCOUNTER — OFFICE VISIT (OUTPATIENT)
Dept: MIDWIFE SERVICES | Facility: CLINIC | Age: 32
End: 2022-08-29
Payer: COMMERCIAL

## 2022-08-29 VITALS — WEIGHT: 136 LBS | BODY MASS INDEX: 21.95 KG/M2 | DIASTOLIC BLOOD PRESSURE: 80 MMHG | SYSTOLIC BLOOD PRESSURE: 114 MMHG

## 2022-08-29 DIAGNOSIS — F41.8 POSTPARTUM ANXIETY: ICD-10-CM

## 2022-08-29 DIAGNOSIS — Z13.220 LIPID SCREENING: ICD-10-CM

## 2022-08-29 DIAGNOSIS — D50.8 OTHER IRON DEFICIENCY ANEMIA: ICD-10-CM

## 2022-08-29 DIAGNOSIS — Z01.419 ENCOUNTER FOR GYNECOLOGICAL EXAMINATION WITHOUT ABNORMAL FINDING: Primary | ICD-10-CM

## 2022-08-29 DIAGNOSIS — R87.612 PAPANICOLAOU SMEAR OF CERVIX WITH LOW GRADE SQUAMOUS INTRAEPITHELIAL LESION (LGSIL): ICD-10-CM

## 2022-08-29 DIAGNOSIS — E05.90 SUBCLINICAL HYPERTHYROIDISM: ICD-10-CM

## 2022-08-29 PROCEDURE — 87624 HPV HI-RISK TYP POOLED RSLT: CPT | Performed by: REGISTERED NURSE

## 2022-08-29 PROCEDURE — 99395 PREV VISIT EST AGE 18-39: CPT | Performed by: REGISTERED NURSE

## 2022-08-29 PROCEDURE — 88175 CYTOPATH C/V AUTO FLUID REDO: CPT | Performed by: REGISTERED NURSE

## 2022-08-29 RX ORDER — LORAZEPAM 0.5 MG/1
0.5 TABLET ORAL EVERY 8 HOURS PRN
Qty: 15 TABLET | Refills: 0 | Status: SHIPPED | OUTPATIENT
Start: 2022-08-29

## 2022-08-29 ASSESSMENT — ANXIETY QUESTIONNAIRES
3. WORRYING TOO MUCH ABOUT DIFFERENT THINGS: SEVERAL DAYS
7. FEELING AFRAID AS IF SOMETHING AWFUL MIGHT HAPPEN: SEVERAL DAYS
2. NOT BEING ABLE TO STOP OR CONTROL WORRYING: SEVERAL DAYS
1. FEELING NERVOUS, ANXIOUS, OR ON EDGE: MORE THAN HALF THE DAYS
5. BEING SO RESTLESS THAT IT IS HARD TO SIT STILL: SEVERAL DAYS
GAD7 TOTAL SCORE: 8
4. TROUBLE RELAXING: SEVERAL DAYS
6. BECOMING EASILY ANNOYED OR IRRITABLE: SEVERAL DAYS
GAD7 TOTAL SCORE: 8
IF YOU CHECKED OFF ANY PROBLEMS ON THIS QUESTIONNAIRE, HOW DIFFICULT HAVE THESE PROBLEMS MADE IT FOR YOU TO DO YOUR WORK, TAKE CARE OF THINGS AT HOME, OR GET ALONG WITH OTHER PEOPLE: SOMEWHAT DIFFICULT

## 2022-08-29 ASSESSMENT — PATIENT HEALTH QUESTIONNAIRE - PHQ9: SUM OF ALL RESPONSES TO PHQ QUESTIONS 1-9: 0

## 2022-08-29 NOTE — NURSING NOTE
"Chief Complaint   Patient presents with     Physical     Pap due       Initial /80   Wt 61.7 kg (136 lb)   BMI 21.95 kg/m   Estimated body mass index is 21.95 kg/m  as calculated from the following:    Height as of 3/4/22: 1.676 m (5' 6\").    Weight as of this encounter: 61.7 kg (136 lb).  BP completed using cuff size: regular    Questioned patient about current smoking habits.  Pt. has never smoked.          The following HM Due: pap smear    "

## 2022-08-29 NOTE — PATIENT INSTRUCTIONS
Please call [address] [place] [address] to schedule your varicose vein evaluation and consult.     Your labs are ordered as future. Please call the clinic at [phone] to make a lab only appointment.     Preventive Health Recommendations  Female Ages 21 - 39  Yearly exam:   See your health care provider every year in order to  1. Review health changes.  2. Discuss preventive care.    3. Review your medicines if you your provider has prescribed any.    You do not need a Pap test if your uterus was removed (hysterectomy) and you have not had cancer.  You should be tested each year for STIs (sexually transmitted diseases) if you're at risk.   Talk to your provider about how often to have your cholesterol checked, about every 5 years if normal.  If you are at risk for diabetes, you should have a diabetes test (fasting glucose).  Vitamin D deficiency screening and thyroid disease screening is also recommended every 3-5 years depending on risk factors or more often if symptomatic  PAP Smear:   Until age 30: Get a Pap test every three years (more often if you have had an abnormal result)    After age 30: Talk to your provider about whether you should have a Pap test every 3 years or have a Pap test with HPV screening every 5 years.   Shots: Get a flu shot each year. Get a tetanus shot every 10 years.   Nutrition:   Eat at least 5 servings of fruits and vegetables each day  Eat REAL food, stay away from processed foods.  Eat whole-grain bread, whole-wheat pasta and brown rice instead of white grains and rice.  For bone health:  Eat calcium-rich foods or take calcium pills (500 to 600 mg) twice a day with food (1200 mg per day). Also take vitamin D (2000 IUs) each day.     Lifestyle  Exercise regularly (30 minutes a day, 5 days of the week). This will help you control your weight and prevent disease.  Weight bearing exercise such as weight lifting, walking, running and yoga will be beneficial later in life preventing osteoporosis    Limit alcohol to one drink per day.  No smoking.   Wear sunscreen to prevent skin cancer.  See your dentist every six months to one year for an exam and cleaning depending on their recommendation  Get an eye exam every two years or more often if you wear glasses or contacts.

## 2022-08-29 NOTE — PROGRESS NOTES
Daniel is a 31 year old  female who presents for annual exam.     Besides routine health maintenance,  she would like to discuss varicose veins and anxiety management.  HPI:    Menses are IUD regulated.   No LMP recorded..  IUD placed 4/15/2022. Has worked well, one occurence of bleeding. then none .   Using IUD for contraception.    She is not currently considering pregnancy.    REPRODUCTIVE/GYNECOLOGIC HISTORY:  Daniel is sexually active with males and is currently in monogamous relationship.   STI testing offered?  Declined  History of abnormal Pap smear:  Yes  SOCIAL HISTORY  Abuse: does not report having previously been physical or sexually abused.    Do you feel safe in your environment? YES       She  reports that she has never smoked. She has never used smokeless tobacco.    Last PHQ-9 score on record =   PHQ-9 SCORE 2022   PHQ-9 Total Score 0     Last GAD7 score on record =   CHRISTINA-7 SCORE 2022   Total Score 8        Has history of situational anxiety that seems to be worsened in postpartum period. Has used ativan as needed before, feels like this would be helpful as at times she feels like it is hard to cope. Has had therapy before, does not feel like this is an option at this time due to how busy life is. Is not anxious all the time but the anxious waves feel more intense during postpartum period.     Alcohol Score = no concern     HEALTH MAINTENANCE:  Cholesterol: (No results found for: CHOL History of abnormal lipids: No  Mammo: No  . History of abnormal Mammo: Not applicable.  Regular Self Breast Exams: Yes  TSH: (  TSH   Date Value Ref Range Status   2021 0.17 (L) 0.40 - 4.00 mU/L Final   2021 0.37 (L) 0.40 - 4.00 mU/L Final    )  Pap; (No results found for: PAP )- due for follow-up  Immunizations up to date: yes  (Gardasil, Tdap, Flu)  Health maintenance updated:  yes    HEALTHY HABITS  Eating habits: eats regular meals  Calcium/Vitamin D intake: source:  dairy Adequate?  Yes   Exercise: How often do you exercise? A few times a week. Walks with kids, chases little ones around   Have you had an eye exam in the last two years? Has glasses, not regular check-ups   Do you routinely see the dentist once or twice yearly? YES         HISTORY:  OB History    Para Term  AB Living   4 2 2 0 2 2   SAB IAB Ectopic Multiple Live Births   0 0 0 0 2      # Outcome Date GA Lbr Octavio/2nd Weight Sex Delivery Anes PTL Lv   4 Term 22 39w4d 02:27 / 00:10 2.87 kg (6 lb 5.2 oz) F Vag-Spont EPI  HÉCTOR      Complications: Precipitous delivery      Name: BEAR,FEMALE-DAVID      Apgar1: 8  Apgar5: 9   3 Term 21 39w3d 05:00 / 02:13 2.93 kg (6 lb 7.4 oz) F Vag-Spont EPI, IV N HÉCTOR      Name: Jm      Apgar1: 9  Apgar5: 9   2 AB 20 7w0d          1 AB 18 6w0d            Past Medical History:   Diagnosis Date     Abnormal Pap smear of cervix 2018    8/10/21     Anemia during pregnancy in third trimester 01/15/2021    Oral iron started at 28w  Fe transfusions started (last scheduled 2021)  ____Recheck at 36 weeks     Depressive disorder     anxiety-well managed, no meds     Mild preeclampsia delivered 2021     Past Surgical History:   Procedure Laterality Date     D & C  2018    missed AB     SEPTOPLASTY  2004     TONSILLECTOMY & ADENOIDECTOMY  2004     Family History   Problem Relation Age of Onset     Colon Cancer Maternal Grandfather      Colon Cancer Maternal Aunt      Social History     Socioeconomic History     Marital status:      Spouse name: Silviano     Number of children: 0     Highest education level: Some college, no degree   Occupational History     Occupation: [employer]   Tobacco Use     Smoking status: Never Smoker     Smokeless tobacco: Never Used   Vaping Use     Vaping Use: Never used   Substance and Sexual Activity     Alcohol use: Not Currently     Drug use: Never     Sexual activity: Yes     Partners: Male     Birth  control/protection: I.U.D.     Comment: Mirena IUD inserted: 4/15/2022; due for removal or replacement  by 4/15/2029       Current Outpatient Medications:      levonorgestrel (MIRENA) 20 MCG/DAY IUD, 1 each by Intrauterine route once Inserted: 4/15/2022; due for removal or replacement by 4/15/2029, Disp: , Rfl:    No Known Allergies    Past medical, surgical, social and family history were reviewed and updated in [facility].    ROS:   CONSTITUTIONAL: NEGATIVE for fever, chills, change in weight  INTEGUMENTARU/SKIN: NEGATIVE for worrisome rashes, moles or lesions  EYES: NEGATIVE for vision changes or irritation  ENT: NEGATIVE for ear, mouth and throat problems  RESP: NEGATIVE for significant cough or SOB  BREAST: NEGATIVE for masses, tenderness or discharge  CV: NEGATIVE for chest pain, palpitations or peripheral edema. POSITIVE for varicose veins   GI: NEGATIVE for nausea, abdominal pain, heartburn, or change in bowel habits  : NEGATIVE for unusual urinary or vaginal symptoms.   MUSCULOSKELETAL: NEGATIVE for significant arthralgias or myalgia  NEURO: NEGATIVE for weakness, dizziness or paresthesias  PSYCHIATRIC: NEGATIVE for changes in mood or affect    PHYSICAL EXAM:  /80   Wt 61.7 kg (136 lb)   BMI 21.95 kg/m     BMI: Body mass index is 21.95 kg/m .  Constitutional: healthy, alert and no distress  Neck: symmetrical, thyroid normal size, no masses present, no lymphadenopathy present.   Cardiovascular: RRR, no murmurs present. Varicose vein present, right leg, extends from inner aspect of thigh over outside of right knee.   Respiratory: breathing unlabored, lungs CTA bilaterally  Breast:normal without masses, tenderness or nipple discharge and no palpable axillary masses or adenopathy  Gastrointestinal: abdomen soft, non-tender, bowel sounds present  PELVIC EXAM:  Vulva: No lesions, no adenopathy, BUS WNL  Vagina: Moist, pink, discharge normal  well rugated, no lesions  Cervix:smooth, pink, no visible lesions.  IUD strings visible at normal length.   Uterus: Normal size, non-tender, mobile  Ovaries: No masses palpated  Rectal exam: deferred    ASSESSMENT/PLAN:    ICD-10-CM    1. Encounter for gynecological examination without abnormal finding  Z01.419    2. Papanicolaou smear of cervix with low grade squamous intraepithelial lesion (LGSIL)  R87.612 Pap diagnostic with HPV   3. Other iron deficiency anemia  D50.8 CBC with Platelets and Reflex to Iron Studies   4. Subclinical hyperthyroidism  E05.90 TSH     T3, Free     T4, free   5. Lipid screening  Z13.220 Lipid panel reflex to direct LDL Fasting   6. Postpartum varicose veins of lower extremity  O87.4 Vascular Surgery Referral   7. Postpartum anxiety  O99.345 LORazepam (ATIVAN) 0.5 MG tablet    F41.8      No results found for any visits on 08/29/22.      COUNSELING:   Reviewed preventive health counseling, as reflected in patient instructions      - Follow-up PAP w/ cotesting today     - Tsh, t3, and t4,  cbc w/ iron reflex, fasting lipid panel in as future orders per patient request due to schedule conflict     - zero refill ativan prescribed after CHRISTINA-7 collected. Given history of ativan working for situational anxiety, and current situational anxiety worsen in postpartum period, appropriate for trial. NO long term history of use. If refill needed after this, then will have patient follow-up with mental health provider for continued management or pursue long acting anxiety medication    - referral to vein clinic for varicose veins    Results as available, follow-up in 1 year, sooner if indicated by PAP results.     SWAPNIL Bazzi CNM

## 2022-08-31 LAB
BKR LAB AP GYN ADEQUACY: NORMAL
BKR LAB AP GYN INTERPRETATION: NORMAL
BKR LAB AP HPV REFLEX: NORMAL
BKR LAB AP PREVIOUS ABNL DX: NORMAL
BKR LAB AP PREVIOUS ABNORMAL: NORMAL
PATH REPORT.COMMENTS IMP SPEC: NORMAL
PATH REPORT.COMMENTS IMP SPEC: NORMAL
PATH REPORT.RELEVANT HX SPEC: NORMAL

## 2022-09-02 ENCOUNTER — TELEPHONE (OUTPATIENT)
Dept: VASCULAR SURGERY | Facility: CLINIC | Age: 32
End: 2022-09-02

## 2022-09-02 NOTE — TELEPHONE ENCOUNTER
Called and LVM for Pt requesting a return call to clinic to discuss referral.  Clinic telephone provided.     Jamaica Davison LPN

## 2022-09-02 NOTE — TELEPHONE ENCOUNTER
Pt returned call to clinic to triage postpartum varicose veins of lower extremity to determine appropriate scheduling and necessity for imaging.     Dx: Postpartum varicose veins of lower extremity  Referring provider: Emiliana Abel APRN CNM     Imaging:  None.     SXS:  Pt noted she developed a bulging varicose vein on her RLE on her inner thigh that goes down her leg and across the front of her knee after her first pregnancy/delivery in 03/2021.  Vein is reported as not painful nor does it cause Pt sxs or complications.     Pt denied LE edema, LE skin discoloration, LE hair loss, hx/present LE wounds, surgery/trama to legs/pelvis, hx DVT and FmHx varicose veins    Discussed that because she does not have any sxs or complications that insurance will most likely deny coverage for treatment as this is considered cosmetic.  Pt verbalized understanding, but wished to proceed with evaluation.        Compression stockings:  Not used.      Pt would like to be seen on a Monday.  Offered next available clinic appt with [facility], which Pt accepted. Confirmed appt details and provided clinic address.  Pt verbalized understanding, agreed to current plan and denied any further questions.    Jamaica Davison LPN

## 2022-09-06 ENCOUNTER — PATIENT OUTREACH (OUTPATIENT)
Dept: OBGYN | Facility: CLINIC | Age: 32
End: 2022-09-06

## 2022-09-06 NOTE — RESULT ENCOUNTER NOTE
Dx pap Cc'd to provider as FYI only due to pap hx.  No response needed unless prefer a change in plan.      Normal pap/Neg HPV letter sent through [employer] results. Next pap smear and HPV due in 1 year.     Maki Russell, RN, BSN  Pap Tracking Nurse

## 2022-09-24 ENCOUNTER — HEALTH MAINTENANCE LETTER (OUTPATIENT)
Age: 32
End: 2022-09-24

## 2022-10-10 ENCOUNTER — TELEPHONE (OUTPATIENT)
Dept: INTERVENTIONAL RADIOLOGY/VASCULAR | Facility: CLINIC | Age: 32
End: 2022-10-10

## 2022-12-19 ENCOUNTER — NURSE TRIAGE (OUTPATIENT)
Dept: NURSING | Facility: CLINIC | Age: 32
End: 2022-12-19

## 2022-12-19 ENCOUNTER — HOSPITAL ENCOUNTER (EMERGENCY)
Facility: CLINIC | Age: 32
Discharge: HOME | End: 2022-12-20
Attending: EMERGENCY MEDICINE | Admitting: EMERGENCY MEDICINE
Payer: COMMERCIAL

## 2022-12-19 ENCOUNTER — APPOINTMENT (OUTPATIENT)
Dept: CT IMAGING | Facility: CLINIC | Age: 32
End: 2022-12-19
Attending: EMERGENCY MEDICINE
Payer: COMMERCIAL

## 2022-12-19 DIAGNOSIS — T83.32XA MALPOSITIONED INTRAUTERINE DEVICE (IUD), INITIAL ENCOUNTER: ICD-10-CM

## 2022-12-19 DIAGNOSIS — K52.9 GASTROENTERITIS: ICD-10-CM

## 2022-12-19 LAB
ALBUMIN SERPL BCG-MCNC: 5.6 G/DL (ref 3.5–5.2)
ALP SERPL-CCNC: 128 U/L (ref 35–104)
ALT SERPL W P-5'-P-CCNC: 13 U/L (ref 10–35)
ANION GAP SERPL CALCULATED.3IONS-SCNC: 14 MMOL/L (ref 7–15)
AST SERPL W P-5'-P-CCNC: 17 U/L (ref 10–35)
BASOPHILS # BLD AUTO: 0 10E3/UL (ref 0–0.2)
BASOPHILS NFR BLD AUTO: 0 %
BILIRUB DIRECT SERPL-MCNC: <0.2 MG/DL (ref 0–0.3)
BILIRUB SERPL-MCNC: 1.1 MG/DL
BUN SERPL-MCNC: 13.8 MG/DL (ref 6–20)
CALCIUM SERPL-MCNC: 9.7 MG/DL (ref 8.6–10)
CHLORIDE SERPL-SCNC: 107 MMOL/L (ref 98–107)
CREAT SERPL-MCNC: 0.8 MG/DL (ref 0.51–0.95)
DEPRECATED HCO3 PLAS-SCNC: 22 MMOL/L (ref 22–29)
EOSINOPHIL # BLD AUTO: 0 10E3/UL (ref 0–0.7)
EOSINOPHIL NFR BLD AUTO: 0 %
ERYTHROCYTE [DISTWIDTH] IN BLOOD BY AUTOMATED COUNT: 13.5 % (ref 10–15)
GFR SERPL CREATININE-BSD FRML MDRD: >90 ML/MIN/1.73M2
GLUCOSE SERPL-MCNC: 147 MG/DL (ref 70–99)
HCG SERPL QL: NEGATIVE
HCT VFR BLD AUTO: 47.8 % (ref 35–47)
HGB BLD-MCNC: 15.3 G/DL (ref 11.7–15.7)
IMM GRANULOCYTES # BLD: 0.1 10E3/UL
IMM GRANULOCYTES NFR BLD: 1 %
LIPASE SERPL-CCNC: 25 U/L (ref 13–60)
LYMPHOCYTES # BLD AUTO: 0.5 10E3/UL (ref 0.8–5.3)
LYMPHOCYTES NFR BLD AUTO: 3 %
MCH RBC QN AUTO: 29.8 PG (ref 26.5–33)
MCHC RBC AUTO-ENTMCNC: 32 G/DL (ref 31.5–36.5)
MCV RBC AUTO: 93 FL (ref 78–100)
MONOCYTES # BLD AUTO: 0.6 10E3/UL (ref 0–1.3)
MONOCYTES NFR BLD AUTO: 4 %
NEUTROPHILS # BLD AUTO: 14.1 10E3/UL (ref 1.6–8.3)
NEUTROPHILS NFR BLD AUTO: 92 %
NRBC # BLD AUTO: 0 10E3/UL
NRBC BLD AUTO-RTO: 0 /100
PLATELET # BLD AUTO: 187 10E3/UL (ref 150–450)
POTASSIUM SERPL-SCNC: 3.8 MMOL/L (ref 3.4–5.3)
PROT SERPL-MCNC: 8.9 G/DL (ref 6.4–8.3)
RBC # BLD AUTO: 5.13 10E6/UL (ref 3.8–5.2)
SODIUM SERPL-SCNC: 143 MMOL/L (ref 136–145)
WBC # BLD AUTO: 15.4 10E3/UL (ref 4–11)

## 2022-12-19 PROCEDURE — 82248 BILIRUBIN DIRECT: CPT | Performed by: EMERGENCY MEDICINE

## 2022-12-19 PROCEDURE — C9803 HOPD COVID-19 SPEC COLLECT: HCPCS

## 2022-12-19 PROCEDURE — 93005 ELECTROCARDIOGRAM TRACING: CPT

## 2022-12-19 PROCEDURE — 85025 COMPLETE CBC W/AUTO DIFF WBC: CPT | Performed by: EMERGENCY MEDICINE

## 2022-12-19 PROCEDURE — 36415 COLL VENOUS BLD VENIPUNCTURE: CPT | Performed by: EMERGENCY MEDICINE

## 2022-12-19 PROCEDURE — 84703 CHORIONIC GONADOTROPIN ASSAY: CPT | Performed by: EMERGENCY MEDICINE

## 2022-12-19 PROCEDURE — 96361 HYDRATE IV INFUSION ADD-ON: CPT

## 2022-12-19 PROCEDURE — 74176 CT ABD & PELVIS W/O CONTRAST: CPT

## 2022-12-19 PROCEDURE — 96375 TX/PRO/DX INJ NEW DRUG ADDON: CPT

## 2022-12-19 PROCEDURE — 87637 SARSCOV2&INF A&B&RSV AMP PRB: CPT | Performed by: EMERGENCY MEDICINE

## 2022-12-19 PROCEDURE — 99285 EMERGENCY DEPT VISIT HI MDM: CPT | Mod: 25

## 2022-12-19 PROCEDURE — 250N000011 HC RX IP 250 OP 636: Performed by: EMERGENCY MEDICINE

## 2022-12-19 PROCEDURE — 83690 ASSAY OF LIPASE: CPT | Performed by: EMERGENCY MEDICINE

## 2022-12-19 PROCEDURE — 96374 THER/PROPH/DIAG INJ IV PUSH: CPT

## 2022-12-19 RX ORDER — ONDANSETRON 2 MG/ML
4 INJECTION INTRAMUSCULAR; INTRAVENOUS ONCE
Status: DISCONTINUED | OUTPATIENT
Start: 2022-12-19 | End: 2022-12-19

## 2022-12-19 RX ORDER — KETOROLAC TROMETHAMINE 15 MG/ML
15 INJECTION, SOLUTION INTRAMUSCULAR; INTRAVENOUS ONCE
Status: COMPLETED | OUTPATIENT
Start: 2022-12-19 | End: 2022-12-19

## 2022-12-19 RX ORDER — ONDANSETRON 2 MG/ML
4 INJECTION INTRAMUSCULAR; INTRAVENOUS ONCE
Status: COMPLETED | OUTPATIENT
Start: 2022-12-19 | End: 2022-12-19

## 2022-12-19 RX ADMIN — ONDANSETRON 4 MG: 2 INJECTION INTRAMUSCULAR; INTRAVENOUS at 23:31

## 2022-12-19 RX ADMIN — KETOROLAC TROMETHAMINE 15 MG: 15 INJECTION, SOLUTION INTRAMUSCULAR; INTRAVENOUS at 23:31

## 2022-12-19 ASSESSMENT — ENCOUNTER SYMPTOMS
ABDOMINAL PAIN: 0
NAUSEA: 1
FEVER: 0
BACK PAIN: 1
HEADACHES: 0
BLOOD IN STOOL: 0
COUGH: 0
FATIGUE: 1
SORE THROAT: 0
DIARRHEA: 1
CHILLS: 0
VOMITING: 1

## 2022-12-20 ENCOUNTER — APPOINTMENT (OUTPATIENT)
Dept: ULTRASOUND IMAGING | Facility: CLINIC | Age: 32
End: 2022-12-20
Attending: EMERGENCY MEDICINE
Payer: COMMERCIAL

## 2022-12-20 VITALS
WEIGHT: 130 LBS | DIASTOLIC BLOOD PRESSURE: 78 MMHG | RESPIRATION RATE: 18 BRPM | OXYGEN SATURATION: 98 % | TEMPERATURE: 97.8 F | HEIGHT: 66 IN | BODY MASS INDEX: 20.89 KG/M2 | HEART RATE: 86 BPM | SYSTOLIC BLOOD PRESSURE: 115 MMHG

## 2022-12-20 LAB
ALBUMIN UR-MCNC: 10 MG/DL
APPEARANCE UR: CLEAR
ATRIAL RATE - MUSE: 78 BPM
BILIRUB UR QL STRIP: NEGATIVE
COLOR UR AUTO: YELLOW
DIASTOLIC BLOOD PRESSURE - MUSE: NORMAL MMHG
FLUAV RNA SPEC QL NAA+PROBE: NEGATIVE
FLUBV RNA RESP QL NAA+PROBE: NEGATIVE
GLUCOSE UR STRIP-MCNC: NEGATIVE MG/DL
HGB UR QL STRIP: NEGATIVE
HOLD SPECIMEN: NORMAL
HOLD SPECIMEN: NORMAL
INTERPRETATION ECG - MUSE: NORMAL
KETONES UR STRIP-MCNC: 20 MG/DL
LEUKOCYTE ESTERASE UR QL STRIP: NEGATIVE
MUCOUS THREADS #/AREA URNS LPF: PRESENT /LPF
NITRATE UR QL: NEGATIVE
P AXIS - MUSE: 59 DEGREES
PH UR STRIP: 5 [PH] (ref 5–7)
PR INTERVAL - MUSE: 130 MS
QRS DURATION - MUSE: 92 MS
QT - MUSE: 420 MS
QTC - MUSE: 478 MS
R AXIS - MUSE: 17 DEGREES
RBC URINE: 1 /HPF
RSV RNA SPEC NAA+PROBE: NEGATIVE
SARS-COV-2 RNA RESP QL NAA+PROBE: NEGATIVE
SP GR UR STRIP: 1.03 (ref 1–1.03)
SQUAMOUS EPITHELIAL: <1 /HPF
SYSTOLIC BLOOD PRESSURE - MUSE: NORMAL MMHG
T AXIS - MUSE: 24 DEGREES
UROBILINOGEN UR STRIP-MCNC: NORMAL MG/DL
VENTRICULAR RATE- MUSE: 78 BPM
WBC URINE: 3 /HPF

## 2022-12-20 PROCEDURE — 258N000003 HC RX IP 258 OP 636: Performed by: EMERGENCY MEDICINE

## 2022-12-20 PROCEDURE — 81001 URINALYSIS AUTO W/SCOPE: CPT | Performed by: EMERGENCY MEDICINE

## 2022-12-20 PROCEDURE — 76830 TRANSVAGINAL US NON-OB: CPT

## 2022-12-20 RX ORDER — ONDANSETRON 4 MG/1
4 TABLET, ORALLY DISINTEGRATING ORAL EVERY 8 HOURS PRN
Qty: 10 TABLET | Refills: 0 | Status: SHIPPED | OUTPATIENT
Start: 2022-12-20 | End: 2022-12-23

## 2022-12-20 RX ADMIN — SODIUM CHLORIDE 1000 ML: 9 INJECTION, SOLUTION INTRAVENOUS at 00:42

## 2022-12-20 ASSESSMENT — ACTIVITIES OF DAILY LIVING (ADL): ADLS_ACUITY_SCORE: 35

## 2022-12-20 NOTE — ED TRIAGE NOTES
Here for concern of bilateral low back pain associated with n/v/d, pain radiating down bilateral legs just prior to arrival. Stated has history of kidney stones. Also c/o sob and feeling like passing out. Denies any blood in urine or dysuria. ABCs intact.      Triage Assessment       Row Name 12/19/22 1338       Triage Assessment (Adult)    Airway WDL WDL       Respiratory WDL    Respiratory WDL WDL       Cardiac WDL    Cardiac WDL WDL

## 2022-12-20 NOTE — ED PROVIDER NOTES
History     Chief Complaint:  Nausea, Vomiting, & Diarrhea and Back Pain       HPI   Daniel Aguilar is a 32 year old female who presents with nausea, vomiting and diarrhea that began around 5:00 today.   reports that patient began feeling unwell around 5:00 with multiple episodes of vomiting and diarrhea.  Also notes some bilateral lower back pain.  She denies any fever.  Denies any blood in the stool.  Denies any significant abdominal pain.  No headache, sore throat, cough or chest pain.  Denies any blood in the urine or dysuria.  Further denies any rash.  Unfortunately as she was making her way into the emergency department patient suffered a syncopal episode.  Was brought in by EMS who was walking by.  Patient denies any injury from syncopal episode.  No significant headache.  She does have a small abrasion to her left elbow and bilateral knees.   also reports that family ate at [employer] for lunch today but wife was the only one who ate a chicken sandwich.  No other recent sick contacts.  No one else at home is ill.  No recent travel.    ROS:  Review of Systems   Constitutional: Positive for fatigue. Negative for chills and fever.   HENT: Negative for sore throat.    Respiratory: Negative for cough.    Gastrointestinal: Positive for diarrhea, nausea and vomiting. Negative for abdominal pain and blood in stool.   Genitourinary: Negative for vaginal bleeding and vaginal discharge.   Musculoskeletal: Positive for back pain.   Skin: Negative for rash.   Neurological: Positive for syncope. Negative for headaches.   All other systems reviewed and are negative.    Allergies:  No Known Allergies     Medications:    ondansetron (ZOFRAN ODT) 4 MG ODT tab  levonorgestrel (MIRENA) 20 MCG/DAY IUD  LORazepam (ATIVAN) 0.5 MG tablet      Past Medical History:    Past Medical History:   Diagnosis Date     Abnormal Pap smear of cervix 2018     Anemia during pregnancy in third trimester 01/15/2021     Depressive  "disorder      Mild preeclampsia delivered 03/26/2021       Past Surgical History:    Past Surgical History:   Procedure Laterality Date     D & C  09/2018    missed AB     SEPTOPLASTY  2004     TONSILLECTOMY & ADENOIDECTOMY  2004        Family History:    family history includes Colon Cancer in her maternal aunt and maternal grandfather.    Social History:   reports that she has never smoked. She has never used smokeless tobacco. She reports that she does not currently use alcohol. She reports that she does not use drugs.  PCP: No Ref-Primary, Physician     Physical Exam     Patient Vitals for the past 24 hrs:   BP Temp Temp src Pulse Resp SpO2 Height Weight   12/20/22 0214 115/78 -- -- 86 -- 98 % -- --   12/19/22 2136 (!) 137/93 97.8  F (36.6  C) Temporal (!) 136 18 98 % 1.676 m (5' 6\") 59 kg (130 lb)        Physical Exam  General: Patient is awake, alert  Head: The scalp, face, and head appear normal  Eyes: The pupils are equal, round, and reactive to light. Conjunctivae and sclerae are normal  ENT: External acoustic canals are normal. The oropharynx is normal without erythema. Uvula is in the midline  Neck: Normal range of motion.   CV: tachycardiac but regular   Resp: Lungs are clear without wheezes or rales. No respiratory distress.   GI: Abdomen is soft, no rigidity, guarding, or rebound. No distension. No tenderness to palpation in any quadrant.     MS: Normal tone. Joints grossly normal without effusions. No asymmetric leg swelling, calf or thigh tenderness.    Skin: abrasion to the left elbow and bilateral knees. Normal capillary refill noted  Neuro: Speech is normal and fluent. Face is symmetric. Moving all extremities.   Psych:  Normal affect.  Appropriate interactions.      Emergency Department Course   ECG:  ECG results from 12/19/22   EKG 12-lead, tracing only     Value    Systolic Blood Pressure     Diastolic Blood Pressure     Ventricular Rate 78    Atrial Rate 78    AZ Interval 130    QRS Duration " 92        QTc 478    P Axis 59    R AXIS 17    T Axis 24    Interpretation ECG      Sinus rhythm  Normal ECG  No previous ECGs available         Imaging:  US Pelvic Complete with Transvaginal   Final Result   IMPRESSION:   1.  IUD present within the endometrium and uterus with extension of one of the arms of the IUD into the myometrium of the fundus on the right extending within 2 mm of the uterine wall. If further evaluation is warranted pelvic MRI can be obtained for    more complete evaluation               CT Abdomen Pelvis w/o Contrast   Final Result   IMPRESSION:    1.  Bilateral nonobstructing renal calculi.   2.  Urinary bladder wall thickening. Consider urinalysis.   3.  Dilated and fluid-filled loops of small bowel are nonspecific. Liquid stool throughout the colon. Constellation of findings could reflect enterocolitis in the appropriate clinical setting. Early small bowel obstruction is also a possibility. Clinical    correlation is recommended.   4.  Possible external positioning of the left arm of the IUD. Ultrasound evaluation is recommended.      Findings a possibly malpositioned IUD were relayed to Dr. Abdullahi.            Report per radiology    Laboratory:  Labs Ordered and Resulted from Time of ED Arrival to Time of ED Departure   BASIC METABOLIC PANEL - Abnormal       Result Value    Sodium 143      Potassium 3.8      Chloride 107      Carbon Dioxide (CO2) 22      Anion Gap 14      Urea Nitrogen 13.8      Creatinine 0.80      Calcium 9.7      Glucose 147 (*)     GFR Estimate >90     CBC WITH PLATELETS AND DIFFERENTIAL - Abnormal    WBC Count 15.4 (*)     RBC Count 5.13      Hemoglobin 15.3      Hematocrit 47.8 (*)     MCV 93      MCH 29.8      MCHC 32.0      RDW 13.5      Platelet Count 187      % Neutrophils 92      % Lymphocytes 3      % Monocytes 4      % Eosinophils 0      % Basophils 0      % Immature Granulocytes 1      NRBCs per 100 WBC 0      Absolute Neutrophils 14.1 (*)      Absolute Lymphocytes 0.5 (*)     Absolute Monocytes 0.6      Absolute Eosinophils 0.0      Absolute Basophils 0.0      Absolute Immature Granulocytes 0.1      Absolute NRBCs 0.0     HEPATIC FUNCTION PANEL - Abnormal    Protein Total 8.9 (*)     Albumin 5.6 (*)     Bilirubin Total 1.1      Alkaline Phosphatase 128 (*)     AST 17      ALT 13      Bilirubin Direct <0.20     ROUTINE UA WITH MICROSCOPIC REFLEX TO CULTURE - Abnormal    Color Urine Yellow      Appearance Urine Clear      Glucose Urine Negative      Bilirubin Urine Negative      Ketones Urine 20 (*)     Specific Gravity Urine 1.026      Blood Urine Negative      pH Urine 5.0      Protein Albumin Urine 10 (*)     Urobilinogen Urine Normal      Nitrite Urine Negative      Leukocyte Esterase Urine Negative      Mucus Urine Present (*)     RBC Urine 1      WBC Urine 3      Squamous Epithelials Urine <1     HCG QUALITATIVE PREGNANCY - Normal    hCG Serum Qualitative Negative     LIPASE - Normal    Lipase 25     INFLUENZA A/B & SARS-COV2 PCR MULTIPLEX - Normal    Influenza A PCR Negative      Influenza B PCR Negative      RSV PCR Negative      SARS CoV2 PCR Negative              Emergency Department Course:  Reviewed:  I reviewed nursing notes, vitals, past medical history and Care Everywhere    Consults:   Spoke with Dr. Miner of OB/GYN regarding malpositioned IUD     Interventions:  Medications   ketorolac (TORADOL) injection 15 mg (15 mg Intravenous Given 12/19/22 2331)   ondansetron (ZOFRAN) injection 4 mg (4 mg Intravenous Given 12/19/22 2331)   0.9% sodium chloride BOLUS (0 mLs Intravenous Stopped 12/20/22 0142)      Disposition:  The patient was discharged to home.     Impression & Plan      Medical Decision Making:  Patient is a 32-year-old female who presents emergency department with nausea, vomiting, diarrhea and back pain that began around 5 PM this afternoon.  Upon initial evaluation she is tachycardic but in a normal sinus rhythm.  Blood  pressures are stable and she is afebrile.  Unfortunately she suffered a fall on her way into the emergency department.  No significant trauma was noted on her head to toe exam.  She has a benign abdominal exam without focal RLQ or LLQ tenderness to suggest diverticulitis or appendicitis, respectively, or other acute abdominal process. I considered a broad differential diagnosis for this patient including viral gastroenteritis, bacterial infection of the large intestine (salmonella, shigella, campylobacter, e coli, etc), bowel obstruction, intra-abdominal infection such as colitis, food poisoning, cholecystitis, UTI, pyelonephritis, appendicitis, etc.  Symptoms are improved after IV fluids and symptomatic treatment.  The patient is not pregnant.  There is no evidence of urinary tract infection. There has been no travel or antibiotic exposure to suggest more concerning cause of diarrhea, and there has been no hematemesis or BRBPR/melena. Vitals improved with IV hydration.  CT scan was obtained due to history of kidney stone.  Thankfully negative for any evidence of ureterolithiasis.  However there was some question about malpositioning of her IUD.  Therefore ultrasound of the pelvis was obtained.  This confirms that IUD is malpositioned and slightly lodged within the myometrium of the uterus.  Discussed the case with OB/GYN who recommended close follow-up for further treatment. I believe she is safe for discharge in improved condition at this time with strict return precautions for recurrent vomiting, pain, fever or any other concerning symptoms.     Diagnosis:    ICD-10-CM    1. Gastroenteritis  K52.9       2. Malpositioned intrauterine device (IUD), initial encounter  T83.32XA            Discharge Medications:  Discharge Medication List as of 12/20/2022  2:08 AM      START taking these medications    Details   ondansetron (ZOFRAN ODT) 4 MG ODT tab Take 1 tablet (4 mg) by mouth every 8 hours as needed for nausea,  Disp-10 tablet, R-0, Local Print              12/19/2022   MD Bradly Daniel Christopher Joseph, MD  12/20/22 0354

## 2022-12-20 NOTE — TELEPHONE ENCOUNTER
"Pt Call: Vomitting    Consent given to talk to  by wife verbally    Background: Had [employer] today for lunch and no food since. Has had \"non stop vomitting for 2 and a half hours and noted some diarrhea.   Only had lunch today.     Symptoms: Vomiting with at first undigested food and now yellow and clear. Denies any black tarry or blood in vomiting. Denies fever and abdominal pain. Is having some flank pain. No headache. Has been able to urinate. Flank pain is 7/10 on both sides of her back. No headache noted. Able to drink water, some coffee, and ginger ale .    Disposition: Go to ED now. First did vomiting protocol and landed on kidney stone. Patient has history of kidney stones. Patient see's OB/MIdwife at [facility], but does not have established PCP. Education given at first for vomiting and call back instructions, but called back patient due to kidney stone symptoms. Patient given understanding and will follow plan to go [facility] ED.    VERITO CHEATHAM RN on 12/19/2022 at 9:10 PM      Reason for Disposition    [1] SEVERE vomiting (e.g., 6 or more times/day, vomits everything) BUT [2] hydrated    Vomiting    Additional Information    Negative: Shock suspected (e.g., cold/pale/clammy skin, too weak to stand, low BP, rapid pulse)    Negative: Difficult to awaken or acting confused (e.g., disoriented, slurred speech)    Negative: Sounds like a life-threatening emergency to the triager    Negative: [1] Pregnant < 20 Weeks AND [2] nausea/vomiting began in early pregnancy (i.e., 4-8 weeks pregnant)    Negative: Vomiting occurs only while coughing    Negative: Chest pain    Negative: Headache is main symptom    Negative: Vomiting (or Nausea) in a cancer patient who is currently (or recently) receiving chemotherapy or radiation therapy, or cancer patient who has metastatic or end-stage cancer and is receiving palliative care    Negative: [1] Vomiting AND [2] contains red blood or black (\"coffee ground\") material  " (Exception: few red streaks in vomit that only happened once)    Negative: Severe pain in one eye    Negative: Recent head injury (within last 3 days)    Negative: Recent abdominal injury (within last 3 days)    Negative: [1] Insulin-dependent diabetes (Type I) AND [2] glucose > 400 mg/dl (22 mmol/l)    Negative: [1] Vomiting AND [2] hernia is more painful or swollen than usual    Negative: [1] SEVERE vomiting (e.g., 6 or more times/day) AND [2] present > 8 hours (Exception: patient sounds well, is drinking liquids, does not sound dehydrated, and vomiting has lasted less than 24 hours)    Negative: [1] MODERATE vomiting (e.g., 3 - 5 times/day) AND [2] age > 60 years    Negative: Severe headache (e.g., excruciating) (Exception: similar to previous migraines)    Negative: High-risk adult (e.g., diabetes mellitus, brain tumor, V-P shunt, hernia)    Negative: [1] Drinking very little AND [2] dehydration suspected (e.g., no urine > 12 hours, very dry mouth, very lightheaded)    Negative: Patient sounds very sick or weak to the triager    Negative: [1] Vomiting AND [2] abdomen looks much more swollen than usual    Negative: [1] Vomiting AND [2] contains bile (green color)    Negative: [1] Constant abdominal pain AND [2] present > 2 hours    Negative: [1] Fever > 103 F (39.4 C) AND [2] not able to get the fever down using Fever Care Advice    Negative: [1] Fever > 101 F (38.3 C) AND [2] age > 60 years    Negative: [1] Fever > 100.0 F (37.8 C) AND [2] bedridden (e.g., nursing home patient, CVA, chronic illness, recovering from surgery)    Negative: [1] Fever > 100.0 F (37.8 C) AND [2] weak immune system (e.g., HIV positive, cancer chemo, splenectomy, organ transplant, chronic steroids)    Negative: Taking any of the following medications: digoxin (Lanoxin), lithium, theophylline, phenytoin (Dilantin)    Negative: [1] MILD or MODERATE vomiting AND [2] present > 48 hours (2 days) (Exception: mild vomiting with associated  diarrhea)    Negative: Fever present > 3 days (72 hours)    Negative: Vomiting a prescription medication    Negative: [1] MILD vomiting with diarrhea AND [2] present > 5 days    Negative: Substance use (drug use) or unhealthy alcohol use, known or suspected    Negative: Vomiting is a chronic symptom (recurrent or ongoing AND present > 4 weeks)    Negative: Passed out (i.e., lost consciousness, collapsed and was not responding)    Negative: Shock suspected (e.g., cold/pale/clammy skin, too weak to stand, low BP, rapid pulse)    Negative: Difficult to awaken or acting confused (e.g., disoriented, slurred speech)    Negative: Followed a major injury to the back (e.g., MVA, fall > 10 feet or 3 meters, penetrating injury, etc.)    Negative: Back pain or flank pain during pregnancy    Negative: Upper, mid or lower back pain that occurs mainly in the midline    Negative: [1] SEVERE pain (e.g., excruciating, scale 8-10) AND [2] present > 1 hour    Negative: [1] SEVERE pain (e.g., excruciating, scale 8-10) AND [2] not improved after pain medicine    Negative: [1] Abdominal pain AND [2] age > 60 years    Negative: [1] Sudden onset of severe flank pain AND [2] age > 60 years    Negative: [1] Unable to urinate (or only a few drops) > 4 hours AND [2] bladder feels very full (e.g., palpable bladder or strong urge to urinate)    Negative: Sounds like a life-threatening emergency to the triager    Protocols used: VOMITING-A-AH, FLANK PAIN-A-AH

## 2023-05-28 NOTE — NURSING NOTE
"Chief Complaint   Patient presents with     Prenatal Care     38 1/7 weeks       Initial /78   Wt 80.7 kg (178 lb)   LMP 2020   BMI 28.73 kg/m   Estimated body mass index is 28.73 kg/m  as calculated from the following:    Height as of 21: 1.676 m (5' 6\").    Weight as of this encounter: 80.7 kg (178 lb).  BP completed using cuff size: regular    Questioned patient about current smoking habits.  Pt. has never smoked.          The following HM Due: NONE    +fetal movement  +swelling in right foot and ankle  Melly Steven, CMA    " Assist RN,  first interaction with pt prior to d/c. [facility]    Discharge instructions, which include signs and symptoms to monitor patient for, as well as detailed information regarding scrotum pain provided.  All questions and concerns addressed at this time. Encouraged patient to schedule a follow- up appointment to be made with patient's PCP. Parent verbalizes understanding.      Patient leaves ER in no apparent distress. Provided education regarding returning to the ER for any new concerns or changes in patient's condition.      BP (!) 134/96   Pulse 74   Temp 36.6 °C (97.9 °F) (Temporal)   Resp 18   Ht 1.829 m (6')   Wt 112 kg (247 lb 9.2 oz)   SpO2 97%   BMI 33.58 kg/m²

## 2023-08-11 NOTE — NURSING NOTE
"Chief Complaint   Patient presents with     Prenatal Care     32w 6d c/o pressure in groin       Initial /78 (BP Location: Left arm, Cuff Size: Adult Regular)   Wt 71.2 kg (157 lb)   LMP 2021   BMI 25.34 kg/m   Estimated body mass index is 25.34 kg/m  as calculated from the following:    Height as of 21: 1.676 m (5' 6\").    Weight as of this encounter: 71.2 kg (157 lb).  BP completed using cuff size: regular    Questioned patient about current smoking habits.  Pt. has never smoked.          The following HM Due: NONE    "
yes

## 2023-08-16 ENCOUNTER — PATIENT OUTREACH (OUTPATIENT)
Dept: MIDWIFE SERVICES | Facility: CLINIC | Age: 33
End: 2023-08-16
Payer: COMMERCIAL

## 2023-10-14 ENCOUNTER — HEALTH MAINTENANCE LETTER (OUTPATIENT)
Age: 33
End: 2023-10-14

## 2024-08-09 NOTE — TELEPHONE ENCOUNTER
"[facility] ([place]) clinic.   24 weeks 4 days pregnant  OBGYN : midwives Last seen by Pinky Fang CNM on 2020.  Hospital: [facility]   Intense cramping pain that comes and goes, getting worse and more frequent. It began 1 hr ago. First pregnancy. She had cramping: tightening yesterday. It stopped after about an hour or so. No bleeding or leaking of fluid noted.   The pain lasts for about 30 seconds or so, gone for a few minutes.   She has not timed them yet. When she gets the pain=\"7\". She is not having pain now. She does think that they are less than 10 minutes apart.    Contacted [facility] and information was given to L&D charge nurse @ 4:59pm. OK to send in.  Patient notified @ 5:00pm.   Answering service contacted @ 5:02pm: Talita Salvador CNM paged @ 5:04pm: notified of above.    Roseanne Rollins RN Triage Nurse Advisor 5:09 PM 2020  .   Reason for Disposition    Contractions < 10 minutes apart for 1 hour (i.e., 6 or more contractions an hour)    Additional Information    Negative: Passed out (i.e., lost consciousness, collapsed and was not responding)    Negative: Shock suspected (e.g., cold/pale/clammy skin, too weak to stand, low BP, rapid pulse)    Negative: Difficult to awaken or acting confused (e.g., disoriented, slurred speech)    Negative: [1] SEVERE abdominal pain (e.g., excruciating) AND [2] constant AND [3] present > 1 hour    Negative: Severe bleeding (e.g., continuous red blood from vagina, or large blood clots)    Negative: Umbilical cord hanging out of the vagina (shiny, white, curled appearance, \"like telephone cord\")    Negative: Uncontrollable urge to push (i.e., feels like baby is coming out now)    Negative: Can see baby    Negative: Sounds like a life-threatening emergency to the triager    Negative: MODERATE-SEVERE abdominal pain    Protocols used: PREGNANCY - LABOR - [id number]-PD  COVID 19 Nurse Triage Plan/Patient Instructions    Please be aware that novel coronavirus " Patient aware.    (COVID-19) may be circulating in the community. If you develop symptoms such as fever, cough, or SOB or if you have concerns about the presence of another infection including coronavirus (COVID-19), please contact your health care provider or visit [email or web address].     Disposition/Instructions    L&D Visit recommended. Follow protocol based instructions.     Bring Your Own Device:  Please also bring your smart device(s) (smart phones, tablets, laptops) and their charging cables for your personal use and to communicate with your care team during your visit.    Thank you for taking steps to prevent the spread of this virus.  o Limit your contact with others.  o Wear a simple mask to cover your cough.  o Wash your hands well and often.    Resources    [facility]: About COVID-19: [email or web address]    CDC: What to Do If You're Sick: www.cdc.gov/coronavirus/2019-ncov/about/steps-when-sick.html    CDC: Ending Home Isolation: www.cdc.gov/coronavirus/2019-ncov/hcp/disposition-in-home-patients.html     CDC: Caring for Someone: www.cdc.gov/coronavirus/2019-ncov/if-you-are-sick/care-for-someone.html     [facility]: Interim Guidance for Hospital Discharge to Home: www.health.[facility].[place]/diseases/coronavirus/hcp/hospdischarge.pdf    [facility] clinical trials (COVID-19 research studies): clinicalaffairs.[facility]-clinical-trials     Below are the COVID-19 hotlines at the [facility] ([facility]). Interpreters are available.   o For health questions: Call 687-338-0289 or 1-841.993.7386 (7 a.m. to 7 p.m.)  o For questions about schools and childcare: Call 189-671-7065 or 1-906.732.9401 (7 a.m. to 7 p.m.)

## 2024-12-01 ENCOUNTER — HEALTH MAINTENANCE LETTER (OUTPATIENT)
Age: 34
End: 2024-12-01

## 2024-12-23 NOTE — TELEPHONE ENCOUNTER
8/29/22 NIL pap, Neg HPV. Plan: cotest in 1 year   Received voicemail from pt's wife, Anais, call to cancel pt's colonoscopy scheduled on 12/27/24.  Spoke with Anais, and pt has a ureteral stone and will be undergoing lithotripsy tomorrow.  Pt removed from schedule.  Offered to reschedule colonoscopy, however Anais states she would like to get through the holidays and will call back to reschedule next week.  Direct contact number and endoscopy scheduling number provided.

## 2025-02-11 NOTE — NURSING NOTE
"Chief Complaint   Patient presents with     Prenatal Care     23w 6d urine culture today       Initial /70 (BP Location: Left arm, Cuff Size: Adult Regular)   Wt 66.7 kg (147 lb)   LMP 2020   BMI 23.73 kg/m   Estimated body mass index is 23.73 kg/m  as calculated from the following:    Height as of 20: 1.676 m (5' 6\").    Weight as of this encounter: 66.7 kg (147 lb).  BP completed using cuff size: regular    Questioned patient about current smoking habits.  Pt. has never smoked.          The following HM Due: NONE  Shaneka Luna CMA    "
None